# Patient Record
Sex: MALE | Race: WHITE | Employment: OTHER | URBAN - METROPOLITAN AREA
[De-identification: names, ages, dates, MRNs, and addresses within clinical notes are randomized per-mention and may not be internally consistent; named-entity substitution may affect disease eponyms.]

---

## 2020-06-04 ENCOUNTER — HOSPITAL ENCOUNTER (INPATIENT)
Age: 70
LOS: 12 days | Discharge: HOME | DRG: 215 | End: 2020-06-16
Attending: EMERGENCY MEDICINE | Admitting: PEDIATRICS
Payer: MEDICARE

## 2020-06-04 ENCOUNTER — APPOINTMENT (OUTPATIENT)
Dept: GENERAL RADIOLOGY | Age: 70
DRG: 215 | End: 2020-06-04
Payer: MEDICARE

## 2020-06-04 PROBLEM — I48.91 ATRIAL FIBRILLATION WITH RAPID VENTRICULAR RESPONSE (HCC): Status: ACTIVE | Noted: 2020-06-04

## 2020-06-04 LAB
A/G RATIO: 1.3 (ref 1.1–2.2)
ALBUMIN SERPL-MCNC: 3.8 G/DL (ref 3.4–5)
ALP BLD-CCNC: 51 U/L (ref 40–129)
ALT SERPL-CCNC: 18 U/L (ref 10–40)
ANION GAP SERPL CALCULATED.3IONS-SCNC: 8 MMOL/L (ref 3–16)
AST SERPL-CCNC: 24 U/L (ref 15–37)
BACTERIA: ABNORMAL /HPF
BASOPHILS ABSOLUTE: 0.1 K/UL (ref 0–0.2)
BASOPHILS RELATIVE PERCENT: 0.9 %
BILIRUB SERPL-MCNC: 1.3 MG/DL (ref 0–1)
BILIRUBIN URINE: NEGATIVE
BLOOD, URINE: NEGATIVE
BUN BLDV-MCNC: 23 MG/DL (ref 7–20)
CALCIUM SERPL-MCNC: 8.8 MG/DL (ref 8.3–10.6)
CHLORIDE BLD-SCNC: 108 MMOL/L (ref 99–110)
CLARITY: CLEAR
CO2: 24 MMOL/L (ref 21–32)
COLOR: YELLOW
CREAT SERPL-MCNC: 1.1 MG/DL (ref 0.8–1.3)
EKG ATRIAL RATE: 138 BPM
EKG DIAGNOSIS: NORMAL
EKG Q-T INTERVAL: 322 MS
EKG QRS DURATION: 98 MS
EKG QTC CALCULATION (BAZETT): 473 MS
EKG R AXIS: -29 DEGREES
EKG T AXIS: 160 DEGREES
EKG VENTRICULAR RATE: 130 BPM
EOSINOPHILS ABSOLUTE: 0.1 K/UL (ref 0–0.6)
EOSINOPHILS RELATIVE PERCENT: 1.4 %
GFR AFRICAN AMERICAN: >60
GFR NON-AFRICAN AMERICAN: >60
GLOBULIN: 2.9 G/DL
GLUCOSE BLD-MCNC: 127 MG/DL (ref 70–99)
GLUCOSE URINE: NEGATIVE MG/DL
HCT VFR BLD CALC: 43.6 % (ref 40.5–52.5)
HEMOGLOBIN: 15 G/DL (ref 13.5–17.5)
KETONES, URINE: NEGATIVE MG/DL
LEUKOCYTE ESTERASE, URINE: NEGATIVE
LYMPHOCYTES ABSOLUTE: 1.6 K/UL (ref 1–5.1)
LYMPHOCYTES RELATIVE PERCENT: 18.7 %
MCH RBC QN AUTO: 33.3 PG (ref 26–34)
MCHC RBC AUTO-ENTMCNC: 34.3 G/DL (ref 31–36)
MCV RBC AUTO: 97 FL (ref 80–100)
MICROSCOPIC EXAMINATION: YES
MONOCYTES ABSOLUTE: 0.7 K/UL (ref 0–1.3)
MONOCYTES RELATIVE PERCENT: 8.2 %
NEUTROPHILS ABSOLUTE: 6 K/UL (ref 1.7–7.7)
NEUTROPHILS RELATIVE PERCENT: 70.8 %
NITRITE, URINE: POSITIVE
PDW BLD-RTO: 13.7 % (ref 12.4–15.4)
PH UA: 7 (ref 5–8)
PLATELET # BLD: 159 K/UL (ref 135–450)
PMV BLD AUTO: 8.4 FL (ref 5–10.5)
POTASSIUM REFLEX MAGNESIUM: 4.3 MMOL/L (ref 3.5–5.1)
PRO-BNP: 2161 PG/ML (ref 0–124)
PROTEIN UA: NEGATIVE MG/DL
RBC # BLD: 4.5 M/UL (ref 4.2–5.9)
RBC UA: ABNORMAL /HPF (ref 0–4)
SARS-COV-2, PCR: NOT DETECTED
SODIUM BLD-SCNC: 140 MMOL/L (ref 136–145)
SPECIFIC GRAVITY UA: 1.01 (ref 1–1.03)
T4 FREE: 1.3 NG/DL (ref 0.9–1.8)
TOTAL PROTEIN: 6.7 G/DL (ref 6.4–8.2)
TROPONIN: <0.01 NG/ML
TSH REFLEX: 4.46 UIU/ML (ref 0.27–4.2)
URINE TYPE: ABNORMAL
UROBILINOGEN, URINE: 0.2 E.U./DL
WBC # BLD: 8.4 K/UL (ref 4–11)
WBC UA: ABNORMAL /HPF (ref 0–5)

## 2020-06-04 PROCEDURE — 84439 ASSAY OF FREE THYROXINE: CPT

## 2020-06-04 PROCEDURE — 84443 ASSAY THYROID STIM HORMONE: CPT

## 2020-06-04 PROCEDURE — 6370000000 HC RX 637 (ALT 250 FOR IP): Performed by: EMERGENCY MEDICINE

## 2020-06-04 PROCEDURE — 99285 EMERGENCY DEPT VISIT HI MDM: CPT

## 2020-06-04 PROCEDURE — 6360000002 HC RX W HCPCS: Performed by: PEDIATRICS

## 2020-06-04 PROCEDURE — 83880 ASSAY OF NATRIURETIC PEPTIDE: CPT

## 2020-06-04 PROCEDURE — 6370000000 HC RX 637 (ALT 250 FOR IP): Performed by: PEDIATRICS

## 2020-06-04 PROCEDURE — 2500000003 HC RX 250 WO HCPCS: Performed by: EMERGENCY MEDICINE

## 2020-06-04 PROCEDURE — 84484 ASSAY OF TROPONIN QUANT: CPT

## 2020-06-04 PROCEDURE — 96376 TX/PRO/DX INJ SAME DRUG ADON: CPT

## 2020-06-04 PROCEDURE — U0003 INFECTIOUS AGENT DETECTION BY NUCLEIC ACID (DNA OR RNA); SEVERE ACUTE RESPIRATORY SYNDROME CORONAVIRUS 2 (SARS-COV-2) (CORONAVIRUS DISEASE [COVID-19]), AMPLIFIED PROBE TECHNIQUE, MAKING USE OF HIGH THROUGHPUT TECHNOLOGIES AS DESCRIBED BY CMS-2020-01-R: HCPCS

## 2020-06-04 PROCEDURE — 96365 THER/PROPH/DIAG IV INF INIT: CPT

## 2020-06-04 PROCEDURE — 87186 SC STD MICRODIL/AGAR DIL: CPT

## 2020-06-04 PROCEDURE — 87077 CULTURE AEROBIC IDENTIFY: CPT

## 2020-06-04 PROCEDURE — 93010 ELECTROCARDIOGRAM REPORT: CPT | Performed by: INTERNAL MEDICINE

## 2020-06-04 PROCEDURE — 81001 URINALYSIS AUTO W/SCOPE: CPT

## 2020-06-04 PROCEDURE — 6370000000 HC RX 637 (ALT 250 FOR IP): Performed by: NURSE PRACTITIONER

## 2020-06-04 PROCEDURE — 80053 COMPREHEN METABOLIC PANEL: CPT

## 2020-06-04 PROCEDURE — 36415 COLL VENOUS BLD VENIPUNCTURE: CPT

## 2020-06-04 PROCEDURE — 87086 URINE CULTURE/COLONY COUNT: CPT

## 2020-06-04 PROCEDURE — 93005 ELECTROCARDIOGRAM TRACING: CPT | Performed by: EMERGENCY MEDICINE

## 2020-06-04 PROCEDURE — 2580000003 HC RX 258: Performed by: EMERGENCY MEDICINE

## 2020-06-04 PROCEDURE — 99291 CRITICAL CARE FIRST HOUR: CPT | Performed by: INTERNAL MEDICINE

## 2020-06-04 PROCEDURE — 85025 COMPLETE CBC W/AUTO DIFF WBC: CPT

## 2020-06-04 PROCEDURE — 2580000003 HC RX 258: Performed by: PEDIATRICS

## 2020-06-04 PROCEDURE — 1200000000 HC SEMI PRIVATE

## 2020-06-04 PROCEDURE — 96375 TX/PRO/DX INJ NEW DRUG ADDON: CPT

## 2020-06-04 PROCEDURE — 71045 X-RAY EXAM CHEST 1 VIEW: CPT

## 2020-06-04 PROCEDURE — 6360000002 HC RX W HCPCS: Performed by: EMERGENCY MEDICINE

## 2020-06-04 PROCEDURE — 6370000000 HC RX 637 (ALT 250 FOR IP): Performed by: INTERNAL MEDICINE

## 2020-06-04 RX ORDER — FUROSEMIDE 10 MG/ML
40 INJECTION INTRAMUSCULAR; INTRAVENOUS 2 TIMES DAILY
Status: CANCELLED | OUTPATIENT
Start: 2020-06-04

## 2020-06-04 RX ORDER — SODIUM CHLORIDE 0.9 % (FLUSH) 0.9 %
10 SYRINGE (ML) INJECTION EVERY 12 HOURS SCHEDULED
Status: DISCONTINUED | OUTPATIENT
Start: 2020-06-04 | End: 2020-06-16 | Stop reason: HOSPADM

## 2020-06-04 RX ORDER — LOSARTAN POTASSIUM 25 MG/1
25 TABLET ORAL DAILY
Status: DISCONTINUED | OUTPATIENT
Start: 2020-06-04 | End: 2020-06-12

## 2020-06-04 RX ORDER — CHOLECALCIFEROL (VITAMIN D3) 125 MCG
5 CAPSULE ORAL NIGHTLY PRN
Status: DISCONTINUED | OUTPATIENT
Start: 2020-06-04 | End: 2020-06-16 | Stop reason: HOSPADM

## 2020-06-04 RX ORDER — ASPIRIN 81 MG/1
81 TABLET ORAL ONCE
Status: COMPLETED | OUTPATIENT
Start: 2020-06-05 | End: 2020-06-05

## 2020-06-04 RX ORDER — CARVEDILOL 6.25 MG/1
6.25 TABLET ORAL 2 TIMES DAILY WITH MEALS
Status: DISCONTINUED | OUTPATIENT
Start: 2020-06-04 | End: 2020-06-06

## 2020-06-04 RX ORDER — DILTIAZEM HYDROCHLORIDE 5 MG/ML
10 INJECTION INTRAVENOUS ONCE
Status: COMPLETED | OUTPATIENT
Start: 2020-06-04 | End: 2020-06-04

## 2020-06-04 RX ORDER — FUROSEMIDE 10 MG/ML
40 INJECTION INTRAMUSCULAR; INTRAVENOUS 2 TIMES DAILY
Status: DISCONTINUED | OUTPATIENT
Start: 2020-06-05 | End: 2020-06-08 | Stop reason: ALTCHOICE

## 2020-06-04 RX ORDER — FUROSEMIDE 10 MG/ML
20 INJECTION INTRAMUSCULAR; INTRAVENOUS 2 TIMES DAILY
Status: DISCONTINUED | OUTPATIENT
Start: 2020-06-04 | End: 2020-06-04

## 2020-06-04 RX ORDER — FUROSEMIDE 10 MG/ML
40 INJECTION INTRAMUSCULAR; INTRAVENOUS ONCE
Status: COMPLETED | OUTPATIENT
Start: 2020-06-04 | End: 2020-06-04

## 2020-06-04 RX ORDER — NITROGLYCERIN 0.4 MG/1
0.4 TABLET SUBLINGUAL EVERY 5 MIN PRN
Status: DISCONTINUED | OUTPATIENT
Start: 2020-06-04 | End: 2020-06-04

## 2020-06-04 RX ORDER — GABAPENTIN 100 MG/1
100 CAPSULE ORAL 3 TIMES DAILY
Status: DISCONTINUED | OUTPATIENT
Start: 2020-06-04 | End: 2020-06-16 | Stop reason: HOSPADM

## 2020-06-04 RX ORDER — TAMSULOSIN HYDROCHLORIDE 0.4 MG/1
0.4 CAPSULE ORAL DAILY
Status: DISCONTINUED | OUTPATIENT
Start: 2020-06-04 | End: 2020-06-16 | Stop reason: HOSPADM

## 2020-06-04 RX ORDER — CARVEDILOL 3.12 MG/1
3.12 TABLET ORAL 2 TIMES DAILY WITH MEALS
Status: DISCONTINUED | OUTPATIENT
Start: 2020-06-04 | End: 2020-06-04

## 2020-06-04 RX ORDER — ASPIRIN 81 MG/1
324 TABLET, CHEWABLE ORAL ONCE
Status: COMPLETED | OUTPATIENT
Start: 2020-06-04 | End: 2020-06-04

## 2020-06-04 RX ORDER — SODIUM CHLORIDE 0.9 % (FLUSH) 0.9 %
10 SYRINGE (ML) INJECTION PRN
Status: DISCONTINUED | OUTPATIENT
Start: 2020-06-04 | End: 2020-06-16 | Stop reason: HOSPADM

## 2020-06-04 RX ADMIN — FUROSEMIDE 20 MG: 10 INJECTION, SOLUTION INTRAMUSCULAR; INTRAVENOUS at 12:21

## 2020-06-04 RX ADMIN — CARVEDILOL 6.25 MG: 6.25 TABLET, FILM COATED ORAL at 17:19

## 2020-06-04 RX ADMIN — GABAPENTIN 100 MG: 100 CAPSULE ORAL at 12:22

## 2020-06-04 RX ADMIN — DILTIAZEM HYDROCHLORIDE 10 MG: 5 INJECTION INTRAVENOUS at 05:55

## 2020-06-04 RX ADMIN — Medication 10 ML: at 12:23

## 2020-06-04 RX ADMIN — NITROGLYCERIN 0.4 MG: 0.4 TABLET, ORALLY DISINTEGRATING SUBLINGUAL at 05:38

## 2020-06-04 RX ADMIN — TAMSULOSIN HYDROCHLORIDE 0.4 MG: 0.4 CAPSULE ORAL at 12:22

## 2020-06-04 RX ADMIN — MELATONIN TAB 5 MG 5 MG: 5 TAB at 21:51

## 2020-06-04 RX ADMIN — DILTIAZEM HYDROCHLORIDE 5 MG/HR: 5 INJECTION INTRAVENOUS at 07:34

## 2020-06-04 RX ADMIN — ASPIRIN 81 MG 324 MG: 81 TABLET ORAL at 05:38

## 2020-06-04 RX ADMIN — CARVEDILOL 3.12 MG: 3.12 TABLET, FILM COATED ORAL at 12:22

## 2020-06-04 RX ADMIN — LOSARTAN POTASSIUM 25 MG: 25 TABLET, FILM COATED ORAL at 12:21

## 2020-06-04 RX ADMIN — GABAPENTIN 100 MG: 100 CAPSULE ORAL at 21:51

## 2020-06-04 RX ADMIN — FUROSEMIDE 40 MG: 10 INJECTION, SOLUTION INTRAMUSCULAR; INTRAVENOUS at 05:39

## 2020-06-04 ASSESSMENT — ENCOUNTER SYMPTOMS
TROUBLE SWALLOWING: 0
SHORTNESS OF BREATH: 1
PHOTOPHOBIA: 0
SINUS PRESSURE: 0
CONSTIPATION: 0
VOMITING: 0
BACK PAIN: 0
WHEEZING: 0
EYE DISCHARGE: 0
ABDOMINAL DISTENTION: 0
FACIAL SWELLING: 0
SORE THROAT: 0
EYE ITCHING: 0
CHEST TIGHTNESS: 0
DIARRHEA: 0
NAUSEA: 0
VOICE CHANGE: 0
COUGH: 0
BLOOD IN STOOL: 0
ABDOMINAL PAIN: 0
EYE REDNESS: 0
RHINORRHEA: 0
STRIDOR: 0
EYE PAIN: 0
ANAL BLEEDING: 0

## 2020-06-04 ASSESSMENT — PAIN DESCRIPTION - PAIN TYPE: TYPE: ACUTE PAIN

## 2020-06-04 ASSESSMENT — PAIN SCALES - GENERAL
PAINLEVEL_OUTOF10: 3
PAINLEVEL_OUTOF10: 8
PAINLEVEL_OUTOF10: 0
PAINLEVEL_OUTOF10: 0

## 2020-06-04 ASSESSMENT — PAIN DESCRIPTION - LOCATION: LOCATION: FOOT

## 2020-06-04 NOTE — ED PROVIDER NOTES
file    Intimate partner violence     Fear of current or ex partner: Not on file     Emotionally abused: Not on file     Physically abused: Not on file     Forced sexual activity: Not on file   Other Topics Concern    Not on file   Social History Narrative    Not on file           Review of Systems   Constitutional: Negative for activity change, appetite change, chills, diaphoresis, fatigue and fever. HENT: Negative. Negative for congestion, dental problem, ear pain, facial swelling, rhinorrhea, sinus pressure, sneezing, sore throat, tinnitus, trouble swallowing and voice change. Eyes: Negative for photophobia, pain, discharge, redness, itching and visual disturbance. Respiratory: Positive for shortness of breath. Negative for cough, chest tightness, wheezing and stridor. Cardiovascular: Positive for palpitations and leg swelling. Negative for chest pain. Gastrointestinal: Negative for abdominal distention, abdominal pain, anal bleeding, blood in stool, constipation, diarrhea, nausea and vomiting. Genitourinary: Negative for difficulty urinating, discharge, dysuria, frequency, hematuria, testicular pain and urgency. Musculoskeletal: Negative for back pain, joint swelling, neck pain and neck stiffness. Skin: Positive for rash. Negative for wound. Neurological: Negative for dizziness, syncope, facial asymmetry, speech difficulty, weakness, numbness and headaches. Hematological: Does not bruise/bleed easily. Psychiatric/Behavioral: Negative for agitation, confusion, hallucinations, self-injury, sleep disturbance and suicidal ideas. The patient is not nervous/anxious. All other systems reviewed and are negative. Physical Exam  Vitals signs and nursing note reviewed. Constitutional:       General: He is not in acute distress. Appearance: He is well-developed. HENT:      Head: Normocephalic and atraumatic.       Right Ear: External ear normal.      Left Ear: External ear normal. Nose: Nose normal.      Mouth/Throat:      Pharynx: No oropharyngeal exudate. Eyes:      General: No scleral icterus. Right eye: No discharge. Left eye: No discharge. Conjunctiva/sclera: Conjunctivae normal.      Pupils: Pupils are equal, round, and reactive to light. Neck:      Musculoskeletal: Normal range of motion and neck supple. Vascular: No JVD. Trachea: No tracheal deviation. Cardiovascular:      Rate and Rhythm: Normal rate and regular rhythm. Heart sounds: Normal heart sounds. No murmur. No friction rub. No gallop. Pulmonary:      Effort: Pulmonary effort is normal. No respiratory distress. Breath sounds: Rales present. No wheezing. Abdominal:      General: Bowel sounds are normal. There is distension. Palpations: Abdomen is soft. There is no mass. Tenderness: There is no abdominal tenderness. There is no guarding or rebound. Musculoskeletal: Normal range of motion. General: No tenderness. Right lower leg: Edema present. Left lower leg: Edema present. Lymphadenopathy:      Cervical: No cervical adenopathy. Skin:     General: Skin is warm and dry. Capillary Refill: Capillary refill takes less than 2 seconds. Coloration: Skin is not pale. Findings: Rash (petechia both lower legs) present. No erythema. Neurological:      Mental Status: He is alert and oriented to person, place, and time. Cranial Nerves: No cranial nerve deficit. Motor: No abnormal muscle tone. Coordination: Coordination normal.      Deep Tendon Reflexes: Reflexes are normal and symmetric. Reflexes normal.   Psychiatric:         Behavior: Behavior normal.         Thought Content:  Thought content normal.         Judgment: Judgment normal.          Procedures     MDM   Results for orders placed or performed during the hospital encounter of 06/04/20   CBC Auto Differential   Result Value Ref Range    WBC 8.4 4.0 - 11.0 K/uL

## 2020-06-04 NOTE — H&P
Hospital Medicine History & Physical      PCP: Pawel Fry MD    Date of Admission: 6/4/2020    Date of Service: Pt seen/examined on 06/04/20 and Admitted to Inpatient with expected LOS greater than two midnights due to medical therapy. Chief Complaint:   \"I couldn't breathe and I lost energy\"       History Of Present Illness:   71 y.o. male who presented to [facility] with report that over the past 2-3 days he has developed shortness of breath and felt weak. He has developed swelling his feet about 3-4 days. He presented to the ED where he was found to be in atrial fibrillation. He reports he had an arrhythmia about 10 years ago - but he doesn't recall the name - but he recalls that p waves were missing. He also reports urinary urgency - having to go suddenly. He had some dysuria a few months ago - after he had a bad cold. Nocturia 2-3 x / night. Upon further discussion - this afib is not new. He was diagnosed with atrial fibrillation about 10 years ago. He was treated over at [facility] and had an ablation, but he said it didn't work. He was started on coumadin but after a while decided he didn't want to take it anymore so stopped taking it. His active problem list in care-everywhere includes atrial fibrillation, sick sinus syndrome, atrial fibrillation with ablation, and cardiomyopathy with an EF of 25%. I'm wondering if there still might be some history he hasn't mentioned- as apixaban is noted in his [facility] care-everywhere as of 11/18/19. He is originally from [place], has lived in the [address] [place], [place] [address] Floor for 30 years. He speaks [place], but sometimes pauses to try to find the right words.       ROS:   No fevers   His feet get cold   No headache   Wears glasses   Reports nasal sinus drainage with associated sore throat     No chest pain   He does have increased work of breathing   Right now he reports dyspnea after walking only a \"few meters\"   No cough     No nausea   No vomiting   No diarrhea   No constipation   No blood in stool     No rash   No DM   No anxiety   No pain - with exception of sometimes in his feet (peripheral neuropathy)     FamHx:   - dad  from heart problems   - mom  - but not sure what from       SocHx:   - does not smoke   - consumes some alcohol, but none recently   - , living with his ex-wife   - has 1 daughter in [facility])   - was working on [employer]         Past Medical History:          Diagnosis Date    Atrial fibrillation [facility])        Past Surgical History:      History reviewed. No pertinent surgical history. Medications Prior to Admission:      Prior to Admission medications    Not on File       Allergies:  Patient has no known allergies. Social History:      The patient currently lives with his ex wife     TOBACCO:   reports that he has never smoked. He has never used smokeless tobacco.  ETOH:   reports previous alcohol use. E-Cigarettes Vaping or Juuling     Questions Responses    Vaping Use     Start Date     Does device contain nicotine? Quit Date     Vaping Type             Family History:    As noted     REVIEW OF SYSTEMS:   Pertinent positives as noted in the HPI. All other systems reviewed and negative. PHYSICAL EXAM PERFORMED:    BP (!) 149/99   Pulse 103   Temp 97.8 °F (36.6 °C) (Oral)   Resp 21   Ht 6' 4\" (1.93 m)   Wt 230 lb (104.3 kg)   SpO2 94%   BMI 28.00 kg/m²     General appearance:  No apparent distress, appears stated age and cooperative. HEENT:  Normal cephalic, atraumatic without obvious deformity. Pupils equal, round, and reactive to light. Extra ocular muscles intact. Conjunctivae/corneas clear. Neck: Supple, with full range of motion. . Trachea midline. + JVD - at least 10 cm     Respiratory:  Normal respiratory effort. Clear to auscultation, bilaterally without Rales/Wheezes/Rhonchi.     Cardiovascular:  Irregularly irregular, tachycardic, but I don't appreciate a murmur     Abdomen: Soft, non-tender   Seems mildly full / distended     Musculoskeletal:     2+ leg edema at the ankles   1+ leg edema just below the knees     Skin: Skin color appropriate     Neurologic:     Speech clear   No facial droop   Moves ext x 4     Psychiatric:  Alert and oriented, thought content appropriate, normal insight  Capillary Refill: Brisk,< 3 seconds   Peripheral Pulses: +2 palpable, equal bilaterally       Labs:     Recent Labs     06/04/20  0530   WBC 8.4   HGB 15.0   HCT 43.6        Recent Labs     06/04/20  0530      K 4.3      CO2 24   BUN 23*   CREATININE 1.1   CALCIUM 8.8     Recent Labs     06/04/20  0530   AST 24   ALT 18   BILITOT 1.3*   ALKPHOS 51     No results for input(s): INR in the last 72 hours. Recent Labs     06/04/20  0530   TROPONINI <0.01       Urinalysis:    No results found for: De La Rosa Bimler, BACTERIA, RBCUA, BLOODU, [address], GLUCOSEU    Radiology:        EKG:  I have reviewed the EKG with the following interpretation: atrial fibrillation, rapid ventricular response, ST changes     XR CHEST PORTABLE   Final Result   No radiographic evidence of acute pulmonary disease.              ASSESSMENT:    Active Hospital Problems    Diagnosis Date Noted    Atrial fibrillation with rapid ventricular response [facility]) [I48.91] 06/04/2020     71 y.o. male     Atrial fibrillation with rapid ventricular response, has prior hx of afib, EKG with RBBB, ST depression in the lateral leads, also with symptoms of heart failure:   - diltiazem drip   - cardiology consulted   - he was successfully cardioverted previously per UC note in care-everywhere (see 10/13/06), also with hx of ablation  - furosemide 20 mg IV BID (at 0900 and 1600)   - aspirin po daily   - echo   - daily weights   - I/Os    - follow K and mag level in AM  - I think his chadvasc score is 2   - he thinks he would usually weight about 225-230 lbs, weight today of 230 lbs   - no known hx of

## 2020-06-04 NOTE — H&P
murmurs, rubs or gallops. Abdomen: Soft, non-tender, non-distended with normal bowel sounds. Musculoskeletal:  No clubbing, cyanosis or edema bilaterally. Full range of motion without deformity. Skin: Skin color, texture, turgor normal.  No rashes or lesions. Neurologic:  Neurovascularly intact without any focal sensory/motor deficits. Cranial nerves: II-XII intact, grossly non-focal.  Psychiatric:  Alert and oriented, thought content appropriate, normal insight  Capillary Refill: Brisk,< 3 seconds   Peripheral Pulses: +2 palpable, equal bilaterally       Labs:     Recent Labs     06/04/20  0530   WBC 8.4   HGB 15.0   HCT 43.6        Recent Labs     06/04/20  0530      K 4.3      CO2 24   BUN 23*   CREATININE 1.1   CALCIUM 8.8     Recent Labs     06/04/20  0530   AST 24   ALT 18   BILITOT 1.3*   ALKPHOS 51     No results for input(s): INR in the last 72 hours. Recent Labs     06/04/20  0530   TROPONINI <0.01       Urinalysis:    No results found for: Joe Hampton, BACTERIA, RBCUA, BLOODU, [address], GLUCOSEU    Radiology:     CXR: I have reviewed the CXR with the following interpretation: ***  EKG:  I have reviewed the EKG with the following interpretation: ***    XR CHEST PORTABLE   Final Result   No radiographic evidence of acute pulmonary disease.              ASSESSMENT:    Active Hospital Problems    Diagnosis Date Noted    Atrial fibrillation with rapid ventricular response ([facility] [address] [I48.91] 06/04/2020     Atrial fibrillation with rapid ventricular response, has prior hx of afib, EKG with RBBB, ST depression in the lateral leads, also with symptoms of heart failure:   - diltiazem drip   - cardiology consulted   - furosemide 40 mg IV daily   - aspirin po daily   - echo   - daily weights   - I/Os        DVT Prophylaxis: ***  Diet: No diet orders on file  Code Status: No Order    PT/OT Eval Status: ***    Dispo - ***       Cynthia Watson MD    Thank you Claudeen Juba, MD for the

## 2020-06-04 NOTE — PROGRESS NOTES
Patient is refusing his Lovenox injection. He states that he had it before and it was bad. Patient stated he took coreg when it was being triaed and it did not work. He agreed to take during this admission but states it does not work for him.

## 2020-06-05 LAB
ANION GAP SERPL CALCULATED.3IONS-SCNC: 10 MMOL/L (ref 3–16)
BUN BLDV-MCNC: 19 MG/DL (ref 7–20)
CALCIUM SERPL-MCNC: 8.5 MG/DL (ref 8.3–10.6)
CHLORIDE BLD-SCNC: 105 MMOL/L (ref 99–110)
CO2: 23 MMOL/L (ref 21–32)
CREAT SERPL-MCNC: 1.1 MG/DL (ref 0.8–1.3)
FOLATE: 12.41 NG/ML (ref 4.78–24.2)
GFR AFRICAN AMERICAN: >60
GFR NON-AFRICAN AMERICAN: >60
GLUCOSE BLD-MCNC: 117 MG/DL (ref 70–99)
MAGNESIUM: 2.2 MG/DL (ref 1.8–2.4)
POTASSIUM REFLEX MAGNESIUM: 4 MMOL/L (ref 3.5–5.1)
SODIUM BLD-SCNC: 138 MMOL/L (ref 136–145)
VITAMIN B-12: 518 PG/ML (ref 211–911)

## 2020-06-05 PROCEDURE — 6360000002 HC RX W HCPCS: Performed by: INTERNAL MEDICINE

## 2020-06-05 PROCEDURE — 82746 ASSAY OF FOLIC ACID SERUM: CPT

## 2020-06-05 PROCEDURE — 82607 VITAMIN B-12: CPT

## 2020-06-05 PROCEDURE — 2580000003 HC RX 258: Performed by: PEDIATRICS

## 2020-06-05 PROCEDURE — 6370000000 HC RX 637 (ALT 250 FOR IP): Performed by: PEDIATRICS

## 2020-06-05 PROCEDURE — 6370000000 HC RX 637 (ALT 250 FOR IP): Performed by: NURSE PRACTITIONER

## 2020-06-05 PROCEDURE — 6360000002 HC RX W HCPCS: Performed by: REGISTERED NURSE

## 2020-06-05 PROCEDURE — 83735 ASSAY OF MAGNESIUM: CPT

## 2020-06-05 PROCEDURE — 2580000003 HC RX 258: Performed by: REGISTERED NURSE

## 2020-06-05 PROCEDURE — 2500000003 HC RX 250 WO HCPCS: Performed by: EMERGENCY MEDICINE

## 2020-06-05 PROCEDURE — 1200000000 HC SEMI PRIVATE

## 2020-06-05 PROCEDURE — 2580000003 HC RX 258: Performed by: EMERGENCY MEDICINE

## 2020-06-05 PROCEDURE — 51798 US URINE CAPACITY MEASURE: CPT

## 2020-06-05 PROCEDURE — 6370000000 HC RX 637 (ALT 250 FOR IP): Performed by: INTERNAL MEDICINE

## 2020-06-05 PROCEDURE — 99233 SBSQ HOSP IP/OBS HIGH 50: CPT | Performed by: INTERNAL MEDICINE

## 2020-06-05 PROCEDURE — 80048 BASIC METABOLIC PNL TOTAL CA: CPT

## 2020-06-05 RX ADMIN — LOSARTAN POTASSIUM 25 MG: 25 TABLET, FILM COATED ORAL at 08:48

## 2020-06-05 RX ADMIN — GABAPENTIN 100 MG: 100 CAPSULE ORAL at 14:44

## 2020-06-05 RX ADMIN — ASPIRIN 81 MG: 81 TABLET, COATED ORAL at 00:11

## 2020-06-05 RX ADMIN — FUROSEMIDE 40 MG: 10 INJECTION, SOLUTION INTRAMUSCULAR; INTRAVENOUS at 17:12

## 2020-06-05 RX ADMIN — TAMSULOSIN HYDROCHLORIDE 0.4 MG: 0.4 CAPSULE ORAL at 08:48

## 2020-06-05 RX ADMIN — CEFTRIAXONE SODIUM 1 G: 1 INJECTION, POWDER, FOR SOLUTION INTRAMUSCULAR; INTRAVENOUS at 14:45

## 2020-06-05 RX ADMIN — FUROSEMIDE 40 MG: 10 INJECTION, SOLUTION INTRAMUSCULAR; INTRAVENOUS at 08:48

## 2020-06-05 RX ADMIN — Medication 10 ML: at 21:56

## 2020-06-05 RX ADMIN — GABAPENTIN 100 MG: 100 CAPSULE ORAL at 21:56

## 2020-06-05 RX ADMIN — Medication 10 ML: at 08:49

## 2020-06-05 RX ADMIN — DILTIAZEM HYDROCHLORIDE 2.5 MG/HR: 5 INJECTION INTRAVENOUS at 22:02

## 2020-06-05 RX ADMIN — DILTIAZEM HYDROCHLORIDE 5 MG/HR: 5 INJECTION INTRAVENOUS at 06:39

## 2020-06-05 RX ADMIN — CARVEDILOL 6.25 MG: 6.25 TABLET, FILM COATED ORAL at 08:48

## 2020-06-05 RX ADMIN — GABAPENTIN 100 MG: 100 CAPSULE ORAL at 08:48

## 2020-06-05 RX ADMIN — MELATONIN TAB 5 MG 5 MG: 5 TAB at 22:23

## 2020-06-05 ASSESSMENT — PAIN SCALES - GENERAL
PAINLEVEL_OUTOF10: 0

## 2020-06-05 NOTE — PROGRESS NOTES
Hospitalist Progress Note      PCP: Milton Denson MD    Date of Admission: 6/4/2020    Chief Complaint: SOB, fatigue     Hospital Course:   71 y.o. male who presented to [facility] with report that over the past 2-3 days he has developed shortness of breath and felt weak. He has developed swelling his feet about 3-4 days. He presented to the ED where he was found to be in atrial fibrillation. He reports he had an arrhythmia about 10 years ago - but he doesn't recall the name - but he recalls that p waves were missing. He also reports urinary urgency - having to go suddenly. He had some dysuria a few months ago - after he had a bad cold. Nocturia 2-3 x / night. Upon further discussion - this afib is not new. He was diagnosed with atrial fibrillation about 10 years ago. He was treated over at [facility] and had an ablation, but he said it didn't work. He was started on coumadin but after a while decided he didn't want to take it anymore so stopped taking it. His active problem list in care-everywhere includes atrial fibrillation, sick sinus syndrome, atrial fibrillation with ablation, and cardiomyopathy with an EF of 25%. I'm wondering if there still might be some history he hasn't mentioned- as apixaban is noted in his [facility] care-everywhere as of 11/18/19. He is originally from [place], has lived in the [address] [place] [address] Floor for 30 years. He speaks [place], but sometimes pauses to try to find the right words. Subjective:   Pt is on RA. Afebrile. VSS. No dyspnea or chest pain. No N/V/D. COVID was negative.      Medications:  Reviewed    Infusion Medications    dilTIAZem (CARDIZEM) 125 mg in dextrose 5% 125 mL infusion 5 mg/hr (06/05/20 9393)     Scheduled Medications    cefTRIAXone (ROCEPHIN) IV  1 g Intravenous Q24H    sodium chloride flush  10 mL Intravenous 2 times per day    losartan  25 mg Oral Daily    gabapentin  100 mg Oral TID    tamsulosin  0.4 mg Oral Daily    carvedilol  6.25 mg Oral BID WC   

## 2020-06-05 NOTE — CONSULTS
[facility]                                  CONSULTATION    PATIENT NAME: Cale Rowe                      :        1950  MED REC NO:   [id number]                          ROOM:       0181  ACCOUNT NO:   [de-identified]                           ADMIT DATE: 2020  PROVIDER:     Marta Boston. Candice Wilburn MD    CONSULT DATE:  2020    REASON FOR CONSULTATION:  Shortness of breath and atrial fibrillation. HISTORY OF PRESENT ILLNESS:  This is a 40-year-old male who presented to  the hospital with chief complaint of shortness of breath. Symptoms  started several days prior to admission. It was worse with exertion,  although constant. He noticed increased associated swelling, no chest  pain. The swelling consists of feeling bloating in his abdomen as well  as swelling in his legs. He has had similar symptoms in the past.   Nothing seemed to precipitate the onset. It has been getting  progressively worse. It was essentially constant. PAST MEDICAL HISTORY:  1. Paroxysmal atrial fibrillation. He has reported that he had a  Mini-Maze procedure over 10 years ago, but had recurrent atrial  fibrillation after that. He was prescribed oral anticoagulation, but  has discontinued his medications and has not followed up with Cardiology  in what appears to be at least 10 years. 2.  Cardiomyopathy. It is reported that he had an ejection fraction of  25% in . Echo that I can locate showed an ejection fraction of 35%  to 40% in . It is unclear what etiology this cardiomyopathy was. 3.  Hyperlipidemia. SOCIAL HISTORY:  He does not smoke. FAMILY HISTORY:  Positive for heart disease. REVIEW OF SYSTEMS:  No fevers or chills. No cough. No focal neurologic  symptoms. No headache. No visual changes. No recent GI or   bleeding. No recent or upcoming surgeries.   All other systems are  negative except as present illness. ALLERGIES:  No known allergies. MEDICATIONS:  Prior to admission, he was not taking any of his  previously prescribed medications. PHYSICAL EXAMINATION:  VITALS:  Blood pressure is 132/94, heart rate 97, respirations 22, and  temperature is 97.8. He is 93% saturated on room air. Physical exam not performed due to COVID-19 isolation and desired to  reduce risk of exposure as well as reduce use of personal protective  equipment. DIAGNOSTIC DATA:  EKG, atrial fibrillation, left ventricular  hypertrophy, ST and T-wave abnormalities. Chest x-ray is negative. Troponin is less than 0.01. ProBNP is 2161. IMPRESSION:  1. Atrial fibrillation as described above. The patient is status post  Mini-Maze procedure over 10 years ago. He had recurrent atrial  fibrillation after this. The best I can tell is that he has not  followed up with Cardiology for approximately 10 years. He has not  taken any of his prescribed anticoagulation or other cardiac  medications. 2.  Shortness of breath, multifactorial including atrial fibrillation  and pulmonary edema. 3.  Acute congestive heart failure, likely systolic. Although his chest  x-ray did not show significant pulmonary edema. His ProBNP is elevated  and clinically, he appears to be volume overloaded. 4.  History of cardiomyopathy. Echo that I can obtain from 2007 showed  an ejection fraction of 35% to 40%. It is reported that he had an  ejection fraction as low as 25% in the past as well. 5.  Hyperlipidemia. 6.  Abnormal EKG. 7.  Tachycardia. 8.  Noncompliance. 9.  COVID-19 test is pending. RECOMMENDATIONS:  1. Diuresis with IV Lasix. 2.  Initiate beta-blocker and angiotensin receptor blocker. 3.  Initiate aspirin therapy. 4.  Anticoagulation, full-dose Lovenox and will transition to oral after  testing is completed, and if no procedures are planned. 5.  Diltiazem drip to control heart rate.   We will

## 2020-06-05 NOTE — PROGRESS NOTES
Nutrition Education    Type and Reason for Visit: Consult, Patient Education    Nutrition Assessment:     RD provided low sodium education today for CHF. Patient did state liking to eat salty foods but understood why that would worsen his condition. RD showed low salt cook books on [address] [facility] [address]. Patient ordered while in the room. RD left contact information for wife to contact if questions arise. · Verbally reviewed information with Patient  · Written educational materials provided. · Contact name and number provided. · Refer to Patient Education activity for more details.     Electronically signed by Moe Meek, [address] [place] [address],  on 6/5/20 at 3:20 PM EDT    Contact Number: Office: [phone]; [address]: [address]

## 2020-06-05 NOTE — PLAN OF CARE
Problem: OXYGENATION/RESPIRATORY FUNCTION  Goal: Patient will maintain patent airway  Outcome: Ongoing  Note:   Patient's EF (Ejection Fraction) is none on file  Heart Failure Medications:  Diuretics[de-identified] Furosemide    (One of the following REQUIRED for EF <40%/SYSTOLIC FAILURE but MAY be used in EF% >40%/DIASTOLIC FAILURE)        ACE[de-identified] None        ARB[de-identified] Losartan         ARNI[de-identified] None    (Beta Blockers)  NON- Evidenced Based Beta Blocker (for EF% >40%/DIASTOLIC FAILURE): None    Evidenced Based Beta Blocker::(REQUIRED for EF% <40%/SYSTOLIC FAILURE) Carvedilol- Coreg  . .................................................................................................................................................. Patient's weights and intake/output reviewed: Yes    Patient's Last Weight: 240 lbs obtained by standing scale. Difference of 0 lbs   than last documented weight. Intake/Output Summary (Last 24 hours) at 6/5/2020 1753  Last data filed at 6/5/2020 1717  Gross per 24 hour   Intake 480 ml   Output 1513 ml   Net -1033 ml       Comorbidities Reviewed Yes    Patient has a past medical history of Atrial fibrillation ([facility] [address] and Peripheral neuropathy. >>For CHF and Comorbidity documentation on Education Time and Topics, please see Education Tab    Progressive Mobility Assessment:  What is this patient's Current Level of Mobility?: Ambulatory-Up Ad Breann  How was this patient Mobilized today?: Up to Chair and Up in Room                 With Whom? Self                 Level of Difficulty/Assistance: Independent     Pt resting in bed at this time on room air. Pt with complaints of shortness of breath. Pt without lower extremity edema.      Patient and/or Family's stated Goal of Care this Admission: reduce shortness of breath, increase activity tolerance, better understand heart failure and disease management, and be more comfortable prior to discharge        :

## 2020-06-05 NOTE — PROGRESS NOTES
Patient transferred to room 207, report to [facility]. [facility] aware, confirmed pt on monitor with Henry Reece.

## 2020-06-06 LAB
AMPHETAMINE SCREEN, URINE: NORMAL
ANION GAP SERPL CALCULATED.3IONS-SCNC: 9 MMOL/L (ref 3–16)
BARBITURATE SCREEN URINE: NORMAL
BENZODIAZEPINE SCREEN, URINE: NORMAL
BUN BLDV-MCNC: 20 MG/DL (ref 7–20)
CALCIUM SERPL-MCNC: 8.9 MG/DL (ref 8.3–10.6)
CANNABINOID SCREEN URINE: NORMAL
CHLORIDE BLD-SCNC: 103 MMOL/L (ref 99–110)
CO2: 28 MMOL/L (ref 21–32)
COCAINE METABOLITE SCREEN URINE: NORMAL
CREAT SERPL-MCNC: 1.3 MG/DL (ref 0.8–1.3)
ETHANOL: NORMAL MG/DL (ref 0–0.08)
GFR AFRICAN AMERICAN: >60
GFR NON-AFRICAN AMERICAN: 55
GLUCOSE BLD-MCNC: 111 MG/DL (ref 70–99)
LV EF: 20 %
LVEF MODALITY: NORMAL
Lab: NORMAL
MAGNESIUM: 2.3 MG/DL (ref 1.8–2.4)
METHADONE SCREEN, URINE: NORMAL
OPIATE SCREEN URINE: NORMAL
ORGANISM: ABNORMAL
OXYCODONE URINE: NORMAL
PH UA: 6
PHENCYCLIDINE SCREEN URINE: NORMAL
POTASSIUM SERPL-SCNC: 4.2 MMOL/L (ref 3.5–5.1)
PROPOXYPHENE SCREEN: NORMAL
SODIUM BLD-SCNC: 140 MMOL/L (ref 136–145)
URINE CULTURE, ROUTINE: ABNORMAL

## 2020-06-06 PROCEDURE — 83835 ASSAY OF METANEPHRINES: CPT

## 2020-06-06 PROCEDURE — 36415 COLL VENOUS BLD VENIPUNCTURE: CPT

## 2020-06-06 PROCEDURE — 6360000002 HC RX W HCPCS: Performed by: INTERNAL MEDICINE

## 2020-06-06 PROCEDURE — 84155 ASSAY OF PROTEIN SERUM: CPT

## 2020-06-06 PROCEDURE — 6370000000 HC RX 637 (ALT 250 FOR IP): Performed by: PEDIATRICS

## 2020-06-06 PROCEDURE — 83883 ASSAY NEPHELOMETRY NOT SPEC: CPT

## 2020-06-06 PROCEDURE — C8929 TTE W OR WO FOL WCON,DOPPLER: HCPCS

## 2020-06-06 PROCEDURE — 80307 DRUG TEST PRSMV CHEM ANLYZR: CPT

## 2020-06-06 PROCEDURE — 84165 PROTEIN E-PHORESIS SERUM: CPT

## 2020-06-06 PROCEDURE — 6370000000 HC RX 637 (ALT 250 FOR IP): Performed by: NURSE PRACTITIONER

## 2020-06-06 PROCEDURE — 84156 ASSAY OF PROTEIN URINE: CPT

## 2020-06-06 PROCEDURE — 86644 CMV ANTIBODY: CPT

## 2020-06-06 PROCEDURE — 86255 FLUORESCENT ANTIBODY SCREEN: CPT

## 2020-06-06 PROCEDURE — 80048 BASIC METABOLIC PNL TOTAL CA: CPT

## 2020-06-06 PROCEDURE — 2580000003 HC RX 258: Performed by: PEDIATRICS

## 2020-06-06 PROCEDURE — 1200000000 HC SEMI PRIVATE

## 2020-06-06 PROCEDURE — G0480 DRUG TEST DEF 1-7 CLASSES: HCPCS

## 2020-06-06 PROCEDURE — 83550 IRON BINDING TEST: CPT

## 2020-06-06 PROCEDURE — 6370000000 HC RX 637 (ALT 250 FOR IP): Performed by: REGISTERED NURSE

## 2020-06-06 PROCEDURE — 6370000000 HC RX 637 (ALT 250 FOR IP): Performed by: INTERNAL MEDICINE

## 2020-06-06 PROCEDURE — 83735 ASSAY OF MAGNESIUM: CPT

## 2020-06-06 PROCEDURE — 84166 PROTEIN E-PHORESIS/URINE/CSF: CPT

## 2020-06-06 PROCEDURE — 86038 ANTINUCLEAR ANTIBODIES: CPT

## 2020-06-06 PROCEDURE — 99232 SBSQ HOSP IP/OBS MODERATE 35: CPT | Performed by: NURSE PRACTITIONER

## 2020-06-06 RX ORDER — ASPIRIN 81 MG/1
81 TABLET ORAL DAILY
Status: DISCONTINUED | OUTPATIENT
Start: 2020-06-06 | End: 2020-06-11

## 2020-06-06 RX ORDER — CEFDINIR 300 MG/1
300 CAPSULE ORAL EVERY 12 HOURS SCHEDULED
Status: COMPLETED | OUTPATIENT
Start: 2020-06-06 | End: 2020-06-12

## 2020-06-06 RX ORDER — METOPROLOL SUCCINATE 25 MG/1
25 TABLET, EXTENDED RELEASE ORAL DAILY
Status: DISCONTINUED | OUTPATIENT
Start: 2020-06-07 | End: 2020-06-08

## 2020-06-06 RX ADMIN — GABAPENTIN 100 MG: 100 CAPSULE ORAL at 14:30

## 2020-06-06 RX ADMIN — CARVEDILOL 6.25 MG: 6.25 TABLET, FILM COATED ORAL at 08:49

## 2020-06-06 RX ADMIN — FUROSEMIDE 40 MG: 10 INJECTION, SOLUTION INTRAMUSCULAR; INTRAVENOUS at 17:10

## 2020-06-06 RX ADMIN — ASPIRIN 81 MG: 81 TABLET ORAL at 10:03

## 2020-06-06 RX ADMIN — TAMSULOSIN HYDROCHLORIDE 0.4 MG: 0.4 CAPSULE ORAL at 08:49

## 2020-06-06 RX ADMIN — CEFDINIR 300 MG: 300 CAPSULE ORAL at 10:03

## 2020-06-06 RX ADMIN — LOSARTAN POTASSIUM 25 MG: 25 TABLET, FILM COATED ORAL at 08:49

## 2020-06-06 RX ADMIN — MELATONIN TAB 5 MG 5 MG: 5 TAB at 21:52

## 2020-06-06 RX ADMIN — FUROSEMIDE 40 MG: 10 INJECTION, SOLUTION INTRAMUSCULAR; INTRAVENOUS at 08:50

## 2020-06-06 RX ADMIN — Medication 10 ML: at 08:50

## 2020-06-06 RX ADMIN — ENOXAPARIN SODIUM 100 MG: 100 INJECTION SUBCUTANEOUS at 21:08

## 2020-06-06 RX ADMIN — Medication 10 ML: at 21:52

## 2020-06-06 RX ADMIN — GABAPENTIN 100 MG: 100 CAPSULE ORAL at 21:52

## 2020-06-06 RX ADMIN — GABAPENTIN 100 MG: 100 CAPSULE ORAL at 08:49

## 2020-06-06 RX ADMIN — CEFDINIR 300 MG: 300 CAPSULE ORAL at 21:52

## 2020-06-06 ASSESSMENT — ENCOUNTER SYMPTOMS
CHEST TIGHTNESS: 0
WHEEZING: 0
SHORTNESS OF BREATH: 0

## 2020-06-06 ASSESSMENT — PAIN SCALES - GENERAL
PAINLEVEL_OUTOF10: 0
PAINLEVEL_OUTOF10: 0

## 2020-06-06 NOTE — PLAN OF CARE
Problem: OXYGENATION/RESPIRATORY FUNCTION  Goal: Patient will maintain patent airway  Outcome: Ongoing     Problem: FLUID AND ELECTROLYTE IMBALANCE  Goal: Fluid and electrolyte balance are achieved/maintained  6/6/2020 1744 by Dilma Villa RN  Outcome: Ongoing       Patient's EF (Ejection Fraction) is less than 40%    Heart Failure Medications:  Diuretics[de-identified] Furosemide    (One of the following REQUIRED for EF <40%/SYSTOLIC FAILURE but MAY be used in EF% >40%/DIASTOLIC FAILURE)        ACE[de-identified] None        ARB[de-identified] Losartan         ARNI[de-identified] None    (Beta Blockers)  NON- Evidenced Based Beta Blocker (for EF% >40%/DIASTOLIC FAILURE): None    Evidenced Based Beta Blocker::(REQUIRED for EF% <40%/SYSTOLIC FAILURE) Carvedilol- Coreg  . .................................................................................................................................................. Patient's weights and intake/output reviewed: Yes    Patient's Last Weight: 244 lbs obtained by standing scale. Intake/Output Summary (Last 24 hours) at 6/6/2020 1744  Last data filed at 6/6/2020 1617  Gross per 24 hour   Intake 1077.96 ml   Output 1725 ml   Net -647.04 ml       Comorbidities Reviewed Yes    Patient has a past medical history of Atrial fibrillation ([facility] [address] and Peripheral neuropathy. >>For CHF and Comorbidity documentation on Education Time and Topics, please see Education Tab    Progressive Mobility Assessment:  What is this patient's Current Level of Mobility?: Ambulatory-Up Ad Breann  How was this patient Mobilized today?:  Up to Toilet/Shower and Up in Room                 With Whom? Self                 Level of Difficulty/Assistance: Independent     Pt sitting in bed at this time on room air. Pt with complaints of shortness of breath. Pt with pitting lower extremity edema.      Patient and/or Family's stated Goal of Care this Admission: reduce shortness of breath, increase activity tolerance, better understand heart failure and disease management, be more comfortable, and reduce lower extremity edema prior to discharge        :

## 2020-06-06 NOTE — PROGRESS NOTES
[facility]  Cardiology  Progress Note    Admission date:  2020    Reason for follow up visit: afib/schf    HPI/CC: Celi Shaffer is a 71 y.o. male who presented to the hospital with complaints of shortness of breath, worse with exertion and edema. COVID ruled out. He was noted to be in afib with RVR started on cardizem gtt. Being diuresed with IV Lasix. Echo ordered, not completed yet. Telemetry overnight shows atrial fibrillation- rate better controlled 70-100s. Subjective: Sitting up in bed. Stated shortness of breath has resolved, edema overall improved. Denies chest pain, shortness of breath, palpitations and dizziness. Vitals:  Blood pressure 120/79, pulse 79, temperature 97.4 °F (36.3 °C), temperature source Oral, resp. rate 19, height 6' 4\" (1.93 m), weight 244 lb 9.6 oz (110.9 kg), SpO2 95 %.   Temp  Av.8 °F (36.6 °C)  Min: 97.3 °F (36.3 °C)  Max: 98.5 °F (36.9 °C)  Pulse  Av.1  Min: 60  Max: 109  BP  Min: 96/68  Max: 144/112  SpO2  Av.7 %  Min: 90 %  Max: 98 %    24 hour I/O    Intake/Output Summary (Last 24 hours) at 2020 0748  Last data filed at 2020 0341  Gross per 24 hour   Intake 480 ml   Output 1813 ml   Net -1333 ml     Current Facility-Administered Medications   Medication Dose Route Frequency Provider Last Rate Last Dose    cefTRIAXone (ROCEPHIN) 1 g IVPB in 50 mL D5W minibag  1 g Intravenous Q24H Anu Marina, APRN - CNP   Stopped at 20 1538    dilTIAZem 125 mg in dextrose 5 % 125 mL infusion  5 mg/hr Intravenous Continuous Adrián De León MD 2.5 mL/hr at 20 2202 2.5 mg/hr at 20 2202    sodium chloride flush 0.9 % injection 10 mL  10 mL Intravenous 2 times per day Gaurav Leone MD   10 mL at 20    sodium chloride flush 0.9 % injection 10 mL  10 mL Intravenous PRN Roger Molina MD        perflutren lipid microspheres (DEFINITY) injection 1.65 mg  1.5 mL Intravenous ONCE PRN Gaurav Leone MD

## 2020-06-07 LAB
ANION GAP SERPL CALCULATED.3IONS-SCNC: 9 MMOL/L (ref 3–16)
ANTI-NUCLEAR ANTIBODY (ANA): NEGATIVE
BUN BLDV-MCNC: 17 MG/DL (ref 7–20)
CALCIUM SERPL-MCNC: 8.9 MG/DL (ref 8.3–10.6)
CHLORIDE BLD-SCNC: 103 MMOL/L (ref 99–110)
CO2: 27 MMOL/L (ref 21–32)
CREAT SERPL-MCNC: 1.2 MG/DL (ref 0.8–1.3)
FERRITIN: 125.3 NG/ML (ref 30–400)
GFR AFRICAN AMERICAN: >60
GFR NON-AFRICAN AMERICAN: 60
GLUCOSE BLD-MCNC: 107 MG/DL (ref 70–99)
IRON SATURATION: 22 % (ref 20–50)
IRON: 70 UG/DL (ref 59–158)
MAGNESIUM: 2.1 MG/DL (ref 1.8–2.4)
POTASSIUM SERPL-SCNC: 3.9 MMOL/L (ref 3.5–5.1)
PRO-BNP: 877 PG/ML (ref 0–124)
PROTEIN PROTEIN: <0.004 G/DL
PROTEIN PROTEIN: <4 MG/DL
SODIUM BLD-SCNC: 139 MMOL/L (ref 136–145)
TOTAL IRON BINDING CAPACITY: 317 UG/DL (ref 260–445)
TOTAL IRON BINDING CAPACITY: 323 UG/DL (ref 260–445)

## 2020-06-07 PROCEDURE — 83735 ASSAY OF MAGNESIUM: CPT

## 2020-06-07 PROCEDURE — 83540 ASSAY OF IRON: CPT

## 2020-06-07 PROCEDURE — 6370000000 HC RX 637 (ALT 250 FOR IP): Performed by: REGISTERED NURSE

## 2020-06-07 PROCEDURE — 6370000000 HC RX 637 (ALT 250 FOR IP): Performed by: NURSE PRACTITIONER

## 2020-06-07 PROCEDURE — 6360000002 HC RX W HCPCS: Performed by: INTERNAL MEDICINE

## 2020-06-07 PROCEDURE — 99232 SBSQ HOSP IP/OBS MODERATE 35: CPT | Performed by: NURSE PRACTITIONER

## 2020-06-07 PROCEDURE — 82728 ASSAY OF FERRITIN: CPT

## 2020-06-07 PROCEDURE — 1200000000 HC SEMI PRIVATE

## 2020-06-07 PROCEDURE — 83880 ASSAY OF NATRIURETIC PEPTIDE: CPT

## 2020-06-07 PROCEDURE — 6370000000 HC RX 637 (ALT 250 FOR IP): Performed by: PEDIATRICS

## 2020-06-07 PROCEDURE — 36415 COLL VENOUS BLD VENIPUNCTURE: CPT

## 2020-06-07 PROCEDURE — 2580000003 HC RX 258: Performed by: PEDIATRICS

## 2020-06-07 PROCEDURE — 80048 BASIC METABOLIC PNL TOTAL CA: CPT

## 2020-06-07 PROCEDURE — 83550 IRON BINDING TEST: CPT

## 2020-06-07 RX ADMIN — ENOXAPARIN SODIUM 100 MG: 100 INJECTION SUBCUTANEOUS at 08:50

## 2020-06-07 RX ADMIN — MELATONIN TAB 5 MG 5 MG: 5 TAB at 20:35

## 2020-06-07 RX ADMIN — CEFDINIR 300 MG: 300 CAPSULE ORAL at 08:50

## 2020-06-07 RX ADMIN — GABAPENTIN 100 MG: 100 CAPSULE ORAL at 20:35

## 2020-06-07 RX ADMIN — CEFDINIR 300 MG: 300 CAPSULE ORAL at 20:35

## 2020-06-07 RX ADMIN — GABAPENTIN 100 MG: 100 CAPSULE ORAL at 08:50

## 2020-06-07 RX ADMIN — ASPIRIN 81 MG: 81 TABLET ORAL at 08:50

## 2020-06-07 RX ADMIN — GABAPENTIN 100 MG: 100 CAPSULE ORAL at 13:56

## 2020-06-07 RX ADMIN — FUROSEMIDE 40 MG: 10 INJECTION, SOLUTION INTRAMUSCULAR; INTRAVENOUS at 08:50

## 2020-06-07 RX ADMIN — Medication 10 ML: at 20:35

## 2020-06-07 RX ADMIN — TAMSULOSIN HYDROCHLORIDE 0.4 MG: 0.4 CAPSULE ORAL at 08:50

## 2020-06-07 RX ADMIN — ENOXAPARIN SODIUM 100 MG: 100 INJECTION SUBCUTANEOUS at 20:34

## 2020-06-07 RX ADMIN — Medication 10 ML: at 08:50

## 2020-06-07 RX ADMIN — FUROSEMIDE 40 MG: 10 INJECTION, SOLUTION INTRAMUSCULAR; INTRAVENOUS at 16:32

## 2020-06-07 RX ADMIN — METOPROLOL SUCCINATE 25 MG: 25 TABLET, EXTENDED RELEASE ORAL at 08:50

## 2020-06-07 RX ADMIN — LOSARTAN POTASSIUM 25 MG: 25 TABLET, FILM COATED ORAL at 08:50

## 2020-06-07 ASSESSMENT — PAIN SCALES - GENERAL
PAINLEVEL_OUTOF10: 0

## 2020-06-07 ASSESSMENT — ENCOUNTER SYMPTOMS
SHORTNESS OF BREATH: 0
CHEST TIGHTNESS: 0
WHEEZING: 0

## 2020-06-07 NOTE — PLAN OF CARE
Problem: OXYGENATION/RESPIRATORY FUNCTION  Goal: Patient will maintain patent airway  Outcome: Ongoing     Problem: FLUID AND ELECTROLYTE IMBALANCE  Goal: Fluid and electrolyte balance are achieved/maintained  Outcome: Ongoing       Patient's EF (Ejection Fraction) is less than 40%    Heart Failure Medications:  Diuretics[de-identified] Furosemide    (One of the following REQUIRED for EF <40%/SYSTOLIC FAILURE but MAY be used in EF% >40%/DIASTOLIC FAILURE)        ACE[de-identified] None        ARB[de-identified] Losartan         ARNI[de-identified] None    (Beta Blockers)  NON- Evidenced Based Beta Blocker (for EF% >40%/DIASTOLIC FAILURE): None    Evidenced Based Beta Blocker::(REQUIRED for EF% <40%/SYSTOLIC FAILURE) Metoprolol succinate  . .................................................................................................................................................. Patient's weights and intake/output reviewed: Yes    Patient's Last Weight: 240 lbs obtained by standing scale. Difference of 4 lbs less than last documented weight. Intake/Output Summary (Last 24 hours) at 6/7/2020 1321  Last data filed at 6/7/2020 1230  Gross per 24 hour   Intake 1797.96 ml   Output 3750 ml   Net -1952.04 ml       Comorbidities Reviewed Yes    Patient has a past medical history of Atrial fibrillation ([facility] [address] and Peripheral neuropathy. >>For CHF and Comorbidity documentation on Education Time and Topics, please see Education Tab    Progressive Mobility Assessment:  What is this patient's Current Level of Mobility?: Ambulatory-Up Ad Breann  How was this patient Mobilized today?: Edge of Bed,  Up to Toilet/Shower, and Up in Room                 With Whom? Self                 Level of Difficulty/Assistance: Independent     Pt sitting in bed at this time on room air. Pt denies shortness of breath. Pt with pitting lower extremity edema.      Patient and/or Family's stated Goal of Care this Admission: increase activity tolerance, better understand heart failure and

## 2020-06-07 NOTE — PROGRESS NOTES
[facility]  Cardiology  Progress Note    Admission date:  2020    Reason for follow up visit: afib/schf    HPI/CC: Elle Felton is a 71 y.o. male who presented to the hospital with complaints of shortness of breath, worse with exertion and edema. COVID ruled out. He was noted to be in afib with RVR started on cardizem gtt. Being diuresed with IV Lasix. Echo ordered and showed EF <20%. Telemetry overnight shows atrial fibrillation- rate better controlled 80-100s. Subjective: Up walking around room. Denies chest pain, shortness of breath, palpitations and dizziness. Edema has improved. Vitals:  Blood pressure 112/81, pulse 102, temperature 97.4 °F (36.3 °C), temperature source Oral, resp. rate 18, height 6' 4\" (1.93 m), weight 240 lb 12.8 oz (109.2 kg), SpO2 93 %.   Temp  Av.8 °F (36.6 °C)  Min: 97.4 °F (36.3 °C)  Max: 98.3 °F (36.8 °C)  Pulse  Av.5  Min: 52  Max: 104  BP  Min: 106/75  Max: 130/86  SpO2  Av.8 %  Min: 91 %  Max: 94 %    24 hour I/O    Intake/Output Summary (Last 24 hours) at 2020 1241  Last data filed at 2020 1230  Gross per 24 hour   Intake 1797.96 ml   Output 3750 ml   Net -1952.04 ml     Current Facility-Administered Medications   Medication Dose Route Frequency Provider Last Rate Last Dose    aspirin EC tablet 81 mg  81 mg Oral Daily ZULEMA Roach - CNP   81 mg at 20 0850    cefdinir (OMNICEF) capsule 300 mg  300 mg Oral 2 times per day ZULEMA Gomez CNP   300 mg at 20 0850    metoprolol succinate (TOPROL XL) extended release tablet 25 mg  25 mg Oral Daily ZULEMA Roach - CNP   25 mg at 20 0850    sodium chloride flush 0.9 % injection 10 mL  10 mL Intravenous 2 times per day Phillip Haider MD   10 mL at 20 0850    sodium chloride flush 0.9 % injection 10 mL  10 mL Intravenous PRN Roger Mcmahon MD        perflutren lipid microspheres (DEFINITY) injection 1.65 mg  1.5 mL Intravenous ONCE regurgitation.   -  Estimated peak pulmonary artery systolic pressure was 43 mmHg. -  There was severe tricuspid valvular regurgitation.   -  The right atrium was severely dilated. -  Respirophasic inferior vena cava changes were blunted (less than         50% variation). -  Recommend endocarditis prophylaxis for mitral valve prolapse. IMPRESSIONS   -  Recommend endocarditis prophylaxis for mitral valve prolapse. Lab Reviewed:     Renal Profile:  Lab Results   Component Value Date    CREATININE 1.2 06/07/2020    BUN 17 06/07/2020     06/07/2020    K 3.9 06/07/2020    K 4.0 06/05/2020     06/07/2020    CO2 27 06/07/2020     CBC:    Lab Results   Component Value Date    WBC 8.4 06/04/2020    RBC 4.50 06/04/2020    HGB 15.0 06/04/2020    HCT 43.6 06/04/2020    MCV 97.0 06/04/2020    RDW 13.7 06/04/2020     06/04/2020     BNP:    Lab Results   Component Value Date    PROBNP 877 06/07/2020    PROBNP 2,161 06/04/2020     Fasting Lipid Panel:  No results found for: CHOL, HDL, TRIG  Cardiac Enzymes:  CK/MbTroponin  Lab Results   Component Value Date    TROPONINI <0.01 06/04/2020     PT/ INR No results found for: INR, PROTIME  PTT No results found for: PTT   Lab Results   Component Value Date    MG 2.10 06/07/2020    No results found for: TSH    All labs and imaging reviewed today    Assessment:  1. Atrial fibrillation with RVR: controlled    -[id number] Score for Atrial Fibrillation Stroke Risk 2   -s/p failed mini-maze    - TSH 4.46- normal T4 1.3   -noncompliant with follow up and anticoagulation  2. Acute on chronic systolic congestive heart failure: ongoing- improved    -weights have been stated weight on 6/4 230- actual standing scale 6/6 -244--240 today   -net negative 5,590 since admission   -BNP 2,161 on admission down to 877 today   -edema remains, improved    -continue daily weights, low sodium diet, 2000 ml fluid restriction, and strict I/O  3.  Cardiomyopathy: EF <20 % per echo 6/6, previous EF 35-40% 2007   -Check iron, light chains, MIGUEL, Metanephrines, IGG, IGM, electrophoresis protein, drug screen,  and ETOH level  4. Shortness of breath: resolved  5. MVP: on echo 2007  6. HLD: will add Lipids to am lab work   7. Noncompliance with medication and follow up   8. UTI  9. Dilated ascending aorta: 4.32 cm  10. Mild AR  11. Mild to moderate TR  12. Moderate to severe pulmonic regurgitation   13. Alcohol abuse: he stated usually 1 [place] whisky drink a day, cessation counseled       Plan:   1. Echo results reviewed with patient, plan for LHC/RHC with Dr. Modesta Pate tomorrow. NPO after midnight. DISCUSSION OF CARDIAC CATHETERIZATION PROCEDURES: The procedures, indications, risks and alternatives have been discussed with the patient and, as appropriate, with the patient's guardian . Risks discussed included, but are not limited to, bleeding, development of blood clots/emboli, damage to blood vessels, renal failure, malignant cardiac arrhythmias, stroke, heart attack, emergent coronary bypass surgery, death, dye allergy. The patient (and guardian as appropriate) expressed understanding of the aforementioned and wished to proceed. 3. Continue losartan, toprol, ASA  4. Continue Lovenox at this time, then transition to NOAC pending if any procedures- pt was refusing, I educated him yesterday in regard to importance of anticoagulation he did take last nights dose and this am.  Hold Lovenox in am prior to [facility]   5. Continue Lasix IV 40 mg BID   6. Continue daily weights, low sodium diet, 2000 ml fluid restriction, and strict I/O  7. Continue to monitor telemetry   8.  Lipid in am     Juda Frankel, APRN - [address]  [phone]

## 2020-06-07 NOTE — PROGRESS NOTES
Hospitalist Progress Note      PCP: Kenneth Keith MD    Date of Admission: 6/4/2020    Chief Complaint: SOB, fatigue     Hospital Course:   71 y.o. male who presented to [facility] with report that over the past 2-3 days he has developed shortness of breath and felt weak. He has developed swelling his feet about 3-4 days. He presented to the ED where he was found to be in atrial fibrillation. He reports he had an arrhythmia about 10 years ago - but he doesn't recall the name - but he recalls that p waves were missing. He also reports urinary urgency - having to go suddenly. He had some dysuria a few months ago - after he had a bad cold. Nocturia 2-3 x / night. Upon further discussion - this afib is not new. He was diagnosed with atrial fibrillation about 10 years ago. He was treated over at [facility] and had an ablation, but he said it didn't work. He was started on coumadin but after a while decided he didn't want to take it anymore so stopped taking it. His active problem list in care-everywhere includes atrial fibrillation, sick sinus syndrome, atrial fibrillation with ablation, and cardiomyopathy with an EF of 25%. I'm wondering if there still might be some history he hasn't mentioned- as apixaban is noted in his [facility] care-everywhere as of 11/18/19. He is originally from [place], has lived in the [facility] for 30 years. He speaks [place], but sometimes pauses to try to find the right words. Subjective:   Pt is on RA. Afebrile. VSS. No dyspnea or chest pain. No N/V/D. Still has lower extremity swelling.     Medications:  Reviewed    Infusion Medications     Scheduled Medications    aspirin  81 mg Oral Daily    cefdinir  300 mg Oral 2 times per day    metoprolol succinate  25 mg Oral Daily    sodium chloride flush  10 mL Intravenous 2 times per day    losartan  25 mg Oral Daily    gabapentin  100 mg Oral TID    tamsulosin  0.4 mg Oral Daily    enoxaparin  1 mg/kg Subcutaneous BID    Radiology:  XR CHEST PORTABLE   Final Result   No radiographic evidence of acute pulmonary disease. Assessment/Plan:    Active Hospital Problems    Diagnosis    Atrial fibrillation with rapid ventricular response ([facility]) [I48.91]    Dyspnea [R06.00]    Acute congestive heart failure ([address] [I50.9]       Atrial fibrillation with RVR POA, currently rate controlled:   - S/p failed mini maze procedure. - Pt started on diltiazem drip on admission. Dc'd and changed to Toprol XL.   - Cardiology consulted/following.   - Continue therapeutic Lovenox (pt currently refusing injections). Transition to NOAC when okay from Cards perspective. Pt had previously been on coumadin however he decided to stop taking it. Also on review of his prior meds it appears that he was on Eliquis at one point.   - Continue to monitor on telemetry.   - TSH mildly elevated. Free T4 is normal.     Acute on chronic systolic CHF:   - Last ECHO in 2007 showed LVEF of 35-40%. - Updated ECHO on 6/6/20 shows LVEF < 20%. - Pt started on IV lasix 40 mg BID -- continue. - Monitor daily weights, close I's and O's and daily BMP. - Continue aspirin, BB and ARB. - Possible plan for LHC/RHC and cardiac MRI at some point.      Urinary urgency:   - Abnormal UA. Urine culture grew E. Coli. Pt started on empiric IV rocephin. Changed to PO omnicef on 6/6.  - Pt started on flomax -- continue.   - Post-void residual was unremarkable.      Peripheral neuropathy:   - Continue gabapentin 100 mg po TID.  - B12 and folate were normal.     **COVID19 test was negative on 6/4**       DVT Prophylaxis: Therapeutic Lovenox   Diet: DIET LOW SODIUM 2 GM;  Code Status: Full Code    PT/OT Eval Status: Not indicated    Dispo - Once he is effectively diuresed. ~2 days.     Sheryle Persons, APRN - CNP

## 2020-06-08 ENCOUNTER — APPOINTMENT (OUTPATIENT)
Dept: CARDIAC CATH/INVASIVE PROCEDURES | Age: 70
DRG: 215 | End: 2020-06-08
Payer: MEDICARE

## 2020-06-08 LAB
ALBUMIN SERPL-MCNC: 3.2 G/DL (ref 3.1–4.9)
ALPHA-1-GLOBULIN: 0.2 G/DL (ref 0.2–0.4)
ALPHA-2-GLOBULIN: 0.6 G/DL (ref 0.4–1.1)
ANION GAP SERPL CALCULATED.3IONS-SCNC: 10 MMOL/L (ref 3–16)
APTT: 28.2 SEC (ref 24.2–36.2)
APTT: 75.1 SEC (ref 24.2–36.2)
BETA GLOBULIN: 1.4 G/DL (ref 0.9–1.6)
BUN BLDV-MCNC: 20 MG/DL (ref 7–20)
CALCIUM SERPL-MCNC: 8.8 MG/DL (ref 8.3–10.6)
CHLORIDE BLD-SCNC: 103 MMOL/L (ref 99–110)
CHOLESTEROL, FASTING: 192 MG/DL (ref 0–199)
CHOLESTEROL, TOTAL: 200 MG/DL (ref 0–199)
CO2: 26 MMOL/L (ref 21–32)
CREAT SERPL-MCNC: 1.2 MG/DL (ref 0.8–1.3)
GAMMA GLOBULIN: 1 G/DL (ref 0.6–1.8)
GFR AFRICAN AMERICAN: >60
GFR NON-AFRICAN AMERICAN: 60
GLUCOSE BLD-MCNC: 106 MG/DL (ref 70–99)
HCT VFR BLD CALC: 43.2 % (ref 40.5–52.5)
HCT VFR BLD CALC: 43.8 % (ref 40.5–52.5)
HDLC SERPL-MCNC: 34 MG/DL (ref 40–60)
HDLC SERPL-MCNC: 35 MG/DL (ref 40–60)
HEMOGLOBIN: 14.8 G/DL (ref 13.5–17.5)
HEMOGLOBIN: 14.9 G/DL (ref 13.5–17.5)
INR BLD: 1.08 (ref 0.86–1.14)
LDL CHOLESTEROL CALCULATED: 137 MG/DL
LDL CHOLESTEROL CALCULATED: 139 MG/DL
LEFT VENTRICULAR EJECTION FRACTION HIGH VALUE: 20 %
LEFT VENTRICULAR EJECTION FRACTION MODE: NORMAL
MAGNESIUM: 2.1 MG/DL (ref 1.8–2.4)
MCH RBC QN AUTO: 33.3 PG (ref 26–34)
MCH RBC QN AUTO: 33.6 PG (ref 26–34)
MCHC RBC AUTO-ENTMCNC: 34.1 G/DL (ref 31–36)
MCHC RBC AUTO-ENTMCNC: 34.2 G/DL (ref 31–36)
MCV RBC AUTO: 97.3 FL (ref 80–100)
MCV RBC AUTO: 98.7 FL (ref 80–100)
PDW BLD-RTO: 13.5 % (ref 12.4–15.4)
PDW BLD-RTO: 13.7 % (ref 12.4–15.4)
PLATELET # BLD: 151 K/UL (ref 135–450)
PLATELET # BLD: 169 K/UL (ref 135–450)
PMV BLD AUTO: 7.8 FL (ref 5–10.5)
PMV BLD AUTO: 7.8 FL (ref 5–10.5)
POTASSIUM SERPL-SCNC: 3.9 MMOL/L (ref 3.5–5.1)
PROTHROMBIN TIME: 12.5 SEC (ref 10–13.2)
RBC # BLD: 4.44 M/UL (ref 4.2–5.9)
RBC # BLD: 4.44 M/UL (ref 4.2–5.9)
SODIUM BLD-SCNC: 139 MMOL/L (ref 136–145)
SPE/IFE INTERPRETATION: NORMAL
TOTAL PROTEIN: 6.4 G/DL (ref 6.4–8.2)
TRIGL SERPL-MCNC: 137 MG/DL (ref 0–150)
TRIGLYCERIDE, FASTING: 101 MG/DL (ref 0–150)
URINE ELECTROPHORESIS INTERP: NORMAL
VLDLC SERPL CALC-MCNC: 20 MG/DL
VLDLC SERPL CALC-MCNC: 27 MG/DL
WBC # BLD: 5.9 K/UL (ref 4–11)
WBC # BLD: 6.4 K/UL (ref 4–11)

## 2020-06-08 PROCEDURE — 85730 THROMBOPLASTIN TIME PARTIAL: CPT

## 2020-06-08 PROCEDURE — 6360000002 HC RX W HCPCS: Performed by: NURSE PRACTITIONER

## 2020-06-08 PROCEDURE — 6360000002 HC RX W HCPCS

## 2020-06-08 PROCEDURE — 6370000000 HC RX 637 (ALT 250 FOR IP): Performed by: PEDIATRICS

## 2020-06-08 PROCEDURE — 83735 ASSAY OF MAGNESIUM: CPT

## 2020-06-08 PROCEDURE — 80048 BASIC METABOLIC PNL TOTAL CA: CPT

## 2020-06-08 PROCEDURE — 2700000000 HC OXYGEN THERAPY PER DAY

## 2020-06-08 PROCEDURE — 6370000000 HC RX 637 (ALT 250 FOR IP): Performed by: INTERNAL MEDICINE

## 2020-06-08 PROCEDURE — 99152 MOD SED SAME PHYS/QHP 5/>YRS: CPT | Performed by: INTERNAL MEDICINE

## 2020-06-08 PROCEDURE — C1894 INTRO/SHEATH, NON-LASER: HCPCS

## 2020-06-08 PROCEDURE — 4A023N8 MEASUREMENT OF CARDIAC SAMPLING AND PRESSURE, BILATERAL, PERCUTANEOUS APPROACH: ICD-10-PCS | Performed by: INTERNAL MEDICINE

## 2020-06-08 PROCEDURE — 6370000000 HC RX 637 (ALT 250 FOR IP)

## 2020-06-08 PROCEDURE — 2060000000 HC ICU INTERMEDIATE R&B

## 2020-06-08 PROCEDURE — C1751 CATH, INF, PER/CENT/MIDLINE: HCPCS

## 2020-06-08 PROCEDURE — 99223 1ST HOSP IP/OBS HIGH 75: CPT | Performed by: INTERNAL MEDICINE

## 2020-06-08 PROCEDURE — 2580000003 HC RX 258

## 2020-06-08 PROCEDURE — 85610 PROTHROMBIN TIME: CPT

## 2020-06-08 PROCEDURE — 2709999900 HC NON-CHARGEABLE SUPPLY

## 2020-06-08 PROCEDURE — C1769 GUIDE WIRE: HCPCS

## 2020-06-08 PROCEDURE — 93460 R&L HRT ART/VENTRICLE ANGIO: CPT

## 2020-06-08 PROCEDURE — B2161ZZ FLUOROSCOPY OF RIGHT AND LEFT HEART USING LOW OSMOLAR CONTRAST: ICD-10-PCS | Performed by: INTERNAL MEDICINE

## 2020-06-08 PROCEDURE — 80061 LIPID PANEL: CPT

## 2020-06-08 PROCEDURE — 6360000002 HC RX W HCPCS: Performed by: INTERNAL MEDICINE

## 2020-06-08 PROCEDURE — 2500000003 HC RX 250 WO HCPCS

## 2020-06-08 PROCEDURE — 6370000000 HC RX 637 (ALT 250 FOR IP): Performed by: NURSE PRACTITIONER

## 2020-06-08 PROCEDURE — 94761 N-INVAS EAR/PLS OXIMETRY MLT: CPT

## 2020-06-08 PROCEDURE — B2111ZZ FLUOROSCOPY OF MULTIPLE CORONARY ARTERIES USING LOW OSMOLAR CONTRAST: ICD-10-PCS | Performed by: INTERNAL MEDICINE

## 2020-06-08 PROCEDURE — 2580000003 HC RX 258: Performed by: INTERNAL MEDICINE

## 2020-06-08 PROCEDURE — 6370000000 HC RX 637 (ALT 250 FOR IP): Performed by: REGISTERED NURSE

## 2020-06-08 PROCEDURE — 93460 R&L HRT ART/VENTRICLE ANGIO: CPT | Performed by: INTERNAL MEDICINE

## 2020-06-08 PROCEDURE — 2580000003 HC RX 258: Performed by: PEDIATRICS

## 2020-06-08 PROCEDURE — 36415 COLL VENOUS BLD VENIPUNCTURE: CPT

## 2020-06-08 PROCEDURE — 6360000004 HC RX CONTRAST MEDICATION

## 2020-06-08 PROCEDURE — 85027 COMPLETE CBC AUTOMATED: CPT

## 2020-06-08 RX ORDER — ONDANSETRON 2 MG/ML
4 INJECTION INTRAMUSCULAR; INTRAVENOUS ONCE
Status: COMPLETED | OUTPATIENT
Start: 2020-06-08 | End: 2020-06-08

## 2020-06-08 RX ORDER — ASPIRIN 81 MG/1
243 TABLET, CHEWABLE ORAL ONCE
Status: COMPLETED | OUTPATIENT
Start: 2020-06-08 | End: 2020-06-08

## 2020-06-08 RX ORDER — MIDAZOLAM HYDROCHLORIDE 1 MG/ML
1 INJECTION INTRAMUSCULAR; INTRAVENOUS ONCE
Status: COMPLETED | OUTPATIENT
Start: 2020-06-08 | End: 2020-06-08

## 2020-06-08 RX ORDER — HEPARIN SODIUM 1000 [USP'U]/ML
2000 INJECTION, SOLUTION INTRAVENOUS; SUBCUTANEOUS PRN
Status: DISCONTINUED | OUTPATIENT
Start: 2020-06-08 | End: 2020-06-09 | Stop reason: ALTCHOICE

## 2020-06-08 RX ORDER — FENTANYL CITRATE 50 UG/ML
25 INJECTION, SOLUTION INTRAMUSCULAR; INTRAVENOUS ONCE
Status: COMPLETED | OUTPATIENT
Start: 2020-06-08 | End: 2020-06-08

## 2020-06-08 RX ORDER — HEPARIN SODIUM 1000 [USP'U]/ML
4000 INJECTION, SOLUTION INTRAVENOUS; SUBCUTANEOUS ONCE
Status: COMPLETED | OUTPATIENT
Start: 2020-06-08 | End: 2020-06-08

## 2020-06-08 RX ORDER — HEPARIN SODIUM 1000 [USP'U]/ML
4000 INJECTION, SOLUTION INTRAVENOUS; SUBCUTANEOUS PRN
Status: DISCONTINUED | OUTPATIENT
Start: 2020-06-08 | End: 2020-06-09 | Stop reason: ALTCHOICE

## 2020-06-08 RX ORDER — HEPARIN SODIUM 10000 [USP'U]/100ML
10 INJECTION, SOLUTION INTRAVENOUS CONTINUOUS
Status: DISCONTINUED | OUTPATIENT
Start: 2020-06-08 | End: 2020-06-09

## 2020-06-08 RX ORDER — ROSUVASTATIN CALCIUM 10 MG/1
10 TABLET, COATED ORAL NIGHTLY
Status: DISCONTINUED | OUTPATIENT
Start: 2020-06-08 | End: 2020-06-16 | Stop reason: HOSPADM

## 2020-06-08 RX ORDER — METOPROLOL SUCCINATE 25 MG/1
12.5 TABLET, EXTENDED RELEASE ORAL DAILY
Status: DISCONTINUED | OUTPATIENT
Start: 2020-06-09 | End: 2020-06-10

## 2020-06-08 RX ADMIN — GABAPENTIN 100 MG: 100 CAPSULE ORAL at 09:47

## 2020-06-08 RX ADMIN — MIDAZOLAM HYDROCHLORIDE 1 MG: 1 INJECTION INTRAMUSCULAR; INTRAVENOUS at 11:50

## 2020-06-08 RX ADMIN — ASPIRIN 243 MG: 81 TABLET, CHEWABLE ORAL at 10:52

## 2020-06-08 RX ADMIN — Medication 10 ML: at 20:56

## 2020-06-08 RX ADMIN — GABAPENTIN 100 MG: 100 CAPSULE ORAL at 20:56

## 2020-06-08 RX ADMIN — HEPARIN SODIUM 4000 UNITS: 1000 INJECTION INTRAVENOUS; SUBCUTANEOUS at 18:00

## 2020-06-08 RX ADMIN — ROSUVASTATIN CALCIUM 10 MG: 10 TABLET, FILM COATED ORAL at 20:56

## 2020-06-08 RX ADMIN — LOSARTAN POTASSIUM 25 MG: 25 TABLET, FILM COATED ORAL at 09:47

## 2020-06-08 RX ADMIN — FENTANYL CITRATE 25 MCG: 50 INJECTION, SOLUTION INTRAMUSCULAR; INTRAVENOUS at 11:33

## 2020-06-08 RX ADMIN — HEPARIN SODIUM AND DEXTROSE 10 ML/HR: 10000; 5 INJECTION INTRAVENOUS at 18:00

## 2020-06-08 RX ADMIN — Medication 10 ML: at 09:48

## 2020-06-08 RX ADMIN — ONDANSETRON 4 MG: 2 INJECTION INTRAMUSCULAR; INTRAVENOUS at 14:33

## 2020-06-08 RX ADMIN — ASPIRIN 81 MG: 81 TABLET ORAL at 09:47

## 2020-06-08 RX ADMIN — MELATONIN TAB 5 MG 5 MG: 5 TAB at 21:01

## 2020-06-08 RX ADMIN — METOPROLOL SUCCINATE 25 MG: 25 TABLET, EXTENDED RELEASE ORAL at 09:48

## 2020-06-08 RX ADMIN — CEFDINIR 300 MG: 300 CAPSULE ORAL at 09:47

## 2020-06-08 RX ADMIN — MIDAZOLAM HYDROCHLORIDE 1 MG: 1 INJECTION INTRAMUSCULAR; INTRAVENOUS at 11:33

## 2020-06-08 RX ADMIN — TAMSULOSIN HYDROCHLORIDE 0.4 MG: 0.4 CAPSULE ORAL at 09:47

## 2020-06-08 RX ADMIN — CEFDINIR 300 MG: 300 CAPSULE ORAL at 20:56

## 2020-06-08 RX ADMIN — ENOXAPARIN SODIUM 100 MG: 100 INJECTION SUBCUTANEOUS at 20:57

## 2020-06-08 ASSESSMENT — PAIN SCALES - GENERAL
PAINLEVEL_OUTOF10: 0

## 2020-06-08 NOTE — PROGRESS NOTES
Radiology:  XR CHEST PORTABLE   Final Result   No radiographic evidence of acute pulmonary disease. Assessment/Plan:    Active Hospital Problems    Diagnosis    Atrial fibrillation with rapid ventricular response ([facility]) [I48.91]    Dyspnea [R06.00]    Acute congestive heart failure ([address] [I50.9]       Atrial fibrillation with RVR POA, currently rate controlled:   - S/p failed mini maze procedure. - Pt started on diltiazem drip on admission. Dc'd and changed to Toprol XL.   - Cardiology consulted/following.   - Continue therapeutic Lovenox (pt currently refusing injections). Transition to NOAC when okay from Cards perspective. Pt had previously been on coumadin however he decided to stop taking it. Also on review of his prior meds it appears that he was on Eliquis at one point.   - Continue to monitor on telemetry.   - TSH mildly elevated. Free T4 is normal.     Acute on chronic systolic CHF:   - Last ECHO in 2007 showed LVEF of 35-40%. - Updated ECHO on 6/6/20 shows LVEF < 20%. - Pt started on IV lasix 40 mg BID -- continue. - Monitor daily weights, close I's and O's and daily BMP. - Continue aspirin, BB and ARB. - Possible plan for LHC/RHC and cardiac MRI at some point. CAD: L/R cath 6/8 found to have extensive disease   - plans for attempted PCI with CT surgery on Wed  - Hep gtt      Urinary urgency:   - Abnormal UA. Urine culture grew E. Coli. Pt started on empiric IV rocephin. Changed to PO omnicef on 6/6.  - Pt started on flomax -- continue.   - Post-void residual was unremarkable.      Peripheral neuropathy:   - Continue gabapentin 100 mg po TID.  - B12 and folate were normal.     **COVID19 test was negative on 6/4**       DVT Prophylaxis: Therapeutic Lovenox   Diet: Diet NPO Time Specified  Code Status: Full Code    PT/OT Eval Status: Not indicated    Dispo - Once he is effectively diuresed. ~2 days.     ZULEMA Estrada - CNP

## 2020-06-08 NOTE — CONSULTS
Smoking status: Never Smoker    Smokeless tobacco: Never Used   Substance and Sexual Activity    Alcohol use: Not Currently    Drug use: Not on file    Sexual activity: Not on file   Lifestyle    Physical activity     Days per week: Not on file     Minutes per session: Not on file    Stress: Not on file   Relationships    Social connections     Talks on phone: Not on file     Gets together: Not on file     Attends [employer] service: Not on file     Active member of club or organization: Not on file     Attends meetings of clubs or organizations: Not on file     Relationship status: Not on file    Intimate partner violence     Fear of current or ex partner: Not on file     Emotionally abused: Not on file     Physically abused: Not on file     Forced sexual activity: Not on file   Other Topics Concern    Not on file   Social History Narrative    Not on file       Family History:  History reviewed. No pertinent family history.     Review of Systems:  Reviewed, negative unless noted  Constitutional: weight change, weakness, impairment of ADLs  Eyes: eyestrain, redness, discharges  ENMT: post nasal drip, sinus pain, discharge   Cardiovascular: faintness, vertigo, color changes in fingers/toes  Respiratory: cough, sputum, hemoptysis  GI: excessive thirst, changes in bowel habits, abdominal swelling  : painful urination, pyuria, incontinence  Musculoskeletal: cramps, swelling, limitation of motor activity  Integumentary: cyanosis, changes in skin, dryness  Neurological: paralysis, tingling, tremors  Psychiatric: restlessness, irritability, mood swings  Endocrine: heat/cold intolerance, excessive sweating, hair loss  Hematologic/lymphatic: swollen glands, anemia, easy bruising/bleeding      Physical Examination:    /88   Pulse 80   Temp 97.3 °F (36.3 °C) (Oral)   Resp 16   Ht 6' 4\" (1.93 m)   Wt 237 lb 11.2 oz (107.8 kg)   SpO2 93%   BMI 28.93 kg/m²      BP RUE:  BP LUE:   Admission Weight: 230 lb with no evidence of left ventricular mass or   thrombus noted. Severe global hypokinesis. Left ventricular diastolic filling pressure are indeterminate. Mitral Valve   Mild thickening of both leaflets of mitral valve. MIld mitral regurgitation. Left Atrium   The left atrium is severely dilated. Aortic Valve   The aortic valve is normal in structure. Trileaflet. Mild ( eccentric) aortic regurgitation. Aorta   The aortic root is upper limits of normal in size. The ascending aorta is dilated. Right Ventricle   The right ventricle is normal in size with decreased function. TAPSE is measured at 12 mm. S'' prime velocity is measured at 7.6 cm/s. Tricuspid Valve   The tricuspid valve is normal in structure. Mild to moderate tricuspid regurgitation. Systolic pulmonary artery pressure (SPAP) is normal and estimated at 38 mmHg   (right atrial pressure 15 mmHg). Right Atrium   The right atrium is severely dilated. Right atrial area is 48.5 cm2. Pulmonic Valve   The pulmonic valve is normal in structure. Moderate to severe pulmonic regurgitation. Pericardial Effusion   No pericardial effusion noted. Pleural Effusion   No pleural effusion. Miscellaneous   No obvious masses, thrombi or vegetations are noted. IVC is dilated (> 2.1 cm) and collapses < 50% with respiration consistent   with markedly elevated right atrial pressure (15 mmHg).      M-Mode/2D Measurements (cm)      LV Diastolic Dimension: 5.51 cm LV Systolic Dimension: 5.72 cm   LV Septum Diastolic: 8.44 cm   LV PW Diastolic: 3.97 cm        AO Root Dimension: 3.7 cm                                   AV Cusp Separation: 2 cm                                   LA Dimension: 4.5 cm                                   LA Area: 37.9 cm2                                   LA volume/Index: 141.33 ml /59 ml/m2     Doppler Measurements      AV Peak Velocity: 80.7 cm/s    MV Peak E-Wave: 69.6 cm/s   AV Peak

## 2020-06-08 NOTE — BRIEF OP NOTE
Brief Postoperative Note      Patient: Omari Francois  YOB: 1950  MRN: [id number]    Brief Postoperative Note  Pre-operative Diagnosis: Dilated cardiomyopathy  Post-operative Diagnosis: Same  Procedure: Moderate sedation  US access to Rt femoral artery and vein  RHC  LHC  LVG  Cors    Anesthesia: Moderate Sedation  Surgeons/Assistants: Airam Oliveira MD, [facility]  Estimated Blood Loss: less than 50   Complications: None  Specimens: Was Not Obtained    Findings:     LEFT HEART CATH  Large vessels  LM: luminals  LAD: mid 99% at bifurcation with very large branching septal and small diag. Distal LAD is very small and likely under filled and not able to appreciate   Very faint R-L and L-L  LCX: mid 30-40%   OM1- large vessel mid 30%  RCA: Dominant, prox shelf like 40% lesions, mid 40%   High PDA/PLV bifurcation    LVEDP:8  LVEF: 20% severe global      RIGHT HEART CATH  RA: 8  RV: 30/8  PA:  30/10     and mean of 17  PCWP:  12    Sats: on RA  Ao:  91%  RA: 68%  PA: 68%    CO/CI (Zheng) 6.84/2.87  SVR//47      Assessment  1. Critical mid LAD 95% with Shayan-2 flow and poor distal targets  2. Severe mixed ischemic and nonischemic Cardiomyopathy: LVEF <20%   - LVEF and echo out of proportion to CAD   - Very complex mid LAD lesions   - need to talk with pt and Ct surgery as very high risk PCI   - will consider cangrelor in case case needs to go to OR   - Need Impella 3.2 at minimum but if pt decompensates, would need IMpella 5 or ECMO backup  3. Start heparin ggt 6 hrs after sheath removal  5.  Bb, statin, ACE ARB as tolerated    Note pericardial calcification under floro          Electronically signed by Rachel Rosario MD on 6/8/2020 at 12:04 PM

## 2020-06-08 NOTE — PROGRESS NOTES
Pt off bedrest. Pt is ambulating around the room. Instructed about the use of the IV pole and calling out if needing help.

## 2020-06-08 NOTE — PROGRESS NOTES
Cayla   Daily Cardiovascular Progress Note    Admit Date: 6/4/2020    Chief complaint: SOB  HPI: Improved, no CP      Medications/Labs all Reviewed:  Patient Active Problem List   Diagnosis    Atrial fibrillation with rapid ventricular response (HCC)    Dyspnea    Acute congestive heart failure (HCC)       Medications:  aspirin EC tablet 81 mg, Daily  cefdinir (OMNICEF) capsule 300 mg, 2 times per day  metoprolol succinate (TOPROL XL) extended release tablet 25 mg, Daily  sodium chloride flush 0.9 % injection 10 mL, 2 times per day  sodium chloride flush 0.9 % injection 10 mL, PRN  perflutren lipid microspheres (DEFINITY) injection 1.65 mg, ONCE PRN  losartan (COZAAR) tablet 25 mg, Daily  gabapentin (NEURONTIN) capsule 100 mg, TID  tamsulosin (FLOMAX) capsule 0.4 mg, Daily  enoxaparin (LOVENOX) injection 100 mg, BID  furosemide (LASIX) injection 40 mg, BID  melatonin tablet 5 mg, Nightly PRN           PHYSICAL EXAM   /88   Pulse 80   Temp 97.3 °F (36.3 °C) (Oral)   Resp 16   Ht 6' 4\" (1.93 m)   Wt 237 lb 11.2 oz (107.8 kg)   SpO2 93%   BMI 28.93 kg/m²    Vitals:    06/08/20 0029 06/08/20 0329 06/08/20 0543 06/08/20 0802   BP: 121/74 125/86  119/88   Pulse: 86 91  80   Resp: 18 18  16   Temp: 98 °F (36.7 °C) 97.6 °F (36.4 °C)  97.3 °F (36.3 °C)   TempSrc: Oral Oral  Oral   SpO2: 91% 92%  93%   Weight:   237 lb 11.2 oz (107.8 kg)    Height:             Intake/Output Summary (Last 24 hours) at 6/8/2020 1011  Last data filed at 6/8/2020 0952  Gross per 24 hour   Intake 1200 ml   Output 3700 ml   Net -2500 ml     Wt Readings from Last 3 Encounters:   06/08/20 237 lb 11.2 oz (107.8 kg)         Gen: Patient in NAD, resting comfortably  Neck: No JVD or bruits  Respiratory: CTAB no WRR  Chest: normal without deformity  Cardiovascular:irregular RR, S1S2, no mrg, normal PMI  Abdomen: Soft, NTND, Normal BS  Extremities: No clubbing, cyanosis, or edema  Neurological/Psychiatric:  AxO x4, No day, cessation counseled         Plan:   1. LHC/RHC today with normal cardiac output and filling pressures but critical LAD lesion with poor distal vessel target   - Plan for high risk PCI Wednesday   - Need surgical backup/OR. Anesthesia and intubation, place impella 3.2, Impella 5 and VA ECMO on standby   - Will plan for Cangrelor load during case and heparin   - Lovenox for now, hold Wed AM  2. Cont ASA, cozaar, toprol   - reduce toprol due to bradycardia  3. Aldactone in future  4. Avoid all EToH  5. Afib monitor for burden- 30day outpt monitor  6. Interval echo in 3 months for AsAo and LVEF     - long d/w with Pt and wife regarding options. Poor surgical targets and poor LVEF. D/w pt high risk PCI. Risk of death, emergency surgery, CHF, possible ecmo, larger impella, LVAD. Risk of JENNIFER, CVA, etc. Option of surgical backup or fabina proceeding with medical tx alone. Pt and wife would like to move forward with high risk PCI   In addition, already d/w pt and wife Risks discussed included, but are not limited to, bleeding, development of blood clots/emboli, damage to blood vessels, renal failure, malignant cardiac arrhythmias, stroke, heart attack, emergent coronary bypass surgery, death, dye allergy.  The patient (and guardian as appropriate) expressed understanding of the aforementioned and wished to proceed. Cardiology Consult ([id number]) Total critical care time was 49 minutes, excluding separately reportable procedures. Services, included in critical care time were chart data review, documentation time, obtaining info from patient, review of nursing notes, and vital sign assessment and management of the patient. There was a high probability of clinically significant life-threatening deterioration in the patient's condition, which required my urgent intervention.          Patient Active Problem List   Diagnosis    Atrial fibrillation with rapid ventricular response (HCC)    Dyspnea    Acute congestive heart failure ([facility] [address] [facility]         Thank you for allowing me to participate in the care of your patient. Please call me with any questions [phone].       Fransico Kaiser MD, [address] Cardiologist  [facility]  [phone] [facility]  [phone] [address] [place] [address]  6/8/2020 10:11 AM

## 2020-06-08 NOTE — PROGRESS NOTES
Upon inspection of Right groin site, the site was hard and tender upon palpation. Slight bruising was visible around the site. Pressure was applied and site was massaged for 3 minutes. Site was softer to touch. Patient remains in semi correa position.

## 2020-06-09 ENCOUNTER — ANESTHESIA EVENT (OUTPATIENT)
Dept: CARDIAC CATH/INVASIVE PROCEDURES | Age: 70
DRG: 215 | End: 2020-06-09
Payer: MEDICARE

## 2020-06-09 LAB
ANCA IFA: NORMAL
ANION GAP SERPL CALCULATED.3IONS-SCNC: 10 MMOL/L (ref 3–16)
APTT: 71.5 SEC (ref 24.2–36.2)
APTT: 95 SEC (ref 24.2–36.2)
BUN BLDV-MCNC: 21 MG/DL (ref 7–20)
CALCIUM SERPL-MCNC: 9.1 MG/DL (ref 8.3–10.6)
CHLORIDE BLD-SCNC: 101 MMOL/L (ref 99–110)
CO2: 24 MMOL/L (ref 21–32)
CREAT SERPL-MCNC: 1.2 MG/DL (ref 0.8–1.3)
CYTOMEGALOVIRUS IGG ANTIBODY: >10 U/ML
GFR AFRICAN AMERICAN: >60
GFR NON-AFRICAN AMERICAN: 60
GLUCOSE BLD-MCNC: 121 MG/DL (ref 70–99)
HCT VFR BLD CALC: 43 % (ref 40.5–52.5)
HEMOGLOBIN: 14.7 G/DL (ref 13.5–17.5)
KAPPA, FREE LIGHT CHAINS, SERUM: 28.29 MG/L (ref 3.3–19.4)
KAPPA/LAMBDA RATIO: 1.22 (ref 0.26–1.65)
KAPPA/LAMBDA TEST COMMENT: ABNORMAL
LAMBDA, FREE LIGHT CHAINS, SERUM: 23.1 MG/L (ref 5.71–26.3)
MAGNESIUM: 2.3 MG/DL (ref 1.8–2.4)
MCH RBC QN AUTO: 33.1 PG (ref 26–34)
MCHC RBC AUTO-ENTMCNC: 34.3 G/DL (ref 31–36)
MCV RBC AUTO: 96.5 FL (ref 80–100)
PDW BLD-RTO: 13.7 % (ref 12.4–15.4)
PLATELET # BLD: 171 K/UL (ref 135–450)
PMV BLD AUTO: 8.3 FL (ref 5–10.5)
POTASSIUM REFLEX MAGNESIUM: 4.2 MMOL/L (ref 3.5–5.1)
POTASSIUM SERPL-SCNC: 4.2 MMOL/L (ref 3.5–5.1)
RBC # BLD: 4.46 M/UL (ref 4.2–5.9)
SODIUM BLD-SCNC: 135 MMOL/L (ref 136–145)
WBC # BLD: 7.1 K/UL (ref 4–11)

## 2020-06-09 PROCEDURE — 85730 THROMBOPLASTIN TIME PARTIAL: CPT

## 2020-06-09 PROCEDURE — 6370000000 HC RX 637 (ALT 250 FOR IP): Performed by: INTERNAL MEDICINE

## 2020-06-09 PROCEDURE — 83735 ASSAY OF MAGNESIUM: CPT

## 2020-06-09 PROCEDURE — 2580000003 HC RX 258: Performed by: INTERNAL MEDICINE

## 2020-06-09 PROCEDURE — 6360000002 HC RX W HCPCS: Performed by: PEDIATRICS

## 2020-06-09 PROCEDURE — 80048 BASIC METABOLIC PNL TOTAL CA: CPT

## 2020-06-09 PROCEDURE — 2060000000 HC ICU INTERMEDIATE R&B

## 2020-06-09 PROCEDURE — 6360000002 HC RX W HCPCS: Performed by: INTERNAL MEDICINE

## 2020-06-09 PROCEDURE — 36415 COLL VENOUS BLD VENIPUNCTURE: CPT

## 2020-06-09 PROCEDURE — 85027 COMPLETE CBC AUTOMATED: CPT

## 2020-06-09 RX ORDER — HEPARIN SODIUM 10000 [USP'U]/100ML
6.2 INJECTION, SOLUTION INTRAVENOUS CONTINUOUS
Status: DISCONTINUED | OUTPATIENT
Start: 2020-06-09 | End: 2020-06-09

## 2020-06-09 RX ADMIN — GABAPENTIN 100 MG: 100 CAPSULE ORAL at 21:04

## 2020-06-09 RX ADMIN — ROSUVASTATIN CALCIUM 10 MG: 10 TABLET, FILM COATED ORAL at 21:04

## 2020-06-09 RX ADMIN — HEPARIN SODIUM AND DEXTROSE 6.2 ML/HR: 10000; 5 INJECTION INTRAVENOUS at 06:20

## 2020-06-09 RX ADMIN — LOSARTAN POTASSIUM 25 MG: 25 TABLET, FILM COATED ORAL at 10:21

## 2020-06-09 RX ADMIN — ENOXAPARIN SODIUM 100 MG: 100 INJECTION SUBCUTANEOUS at 10:21

## 2020-06-09 RX ADMIN — GABAPENTIN 100 MG: 100 CAPSULE ORAL at 15:29

## 2020-06-09 RX ADMIN — TAMSULOSIN HYDROCHLORIDE 0.4 MG: 0.4 CAPSULE ORAL at 10:18

## 2020-06-09 RX ADMIN — ASPIRIN 81 MG: 81 TABLET ORAL at 10:18

## 2020-06-09 RX ADMIN — MELATONIN TAB 5 MG 5 MG: 5 TAB at 21:07

## 2020-06-09 RX ADMIN — Medication 10 ML: at 21:04

## 2020-06-09 RX ADMIN — CEFDINIR 300 MG: 300 CAPSULE ORAL at 21:04

## 2020-06-09 RX ADMIN — ENOXAPARIN SODIUM 100 MG: 100 INJECTION SUBCUTANEOUS at 21:04

## 2020-06-09 RX ADMIN — METOPROLOL SUCCINATE 12.5 MG: 50 TABLET, EXTENDED RELEASE ORAL at 10:18

## 2020-06-09 RX ADMIN — ENOXAPARIN SODIUM 100 MG: 100 INJECTION SUBCUTANEOUS at 10:19

## 2020-06-09 RX ADMIN — Medication 10 ML: at 10:19

## 2020-06-09 RX ADMIN — CEFDINIR 300 MG: 300 CAPSULE ORAL at 10:18

## 2020-06-09 RX ADMIN — GABAPENTIN 100 MG: 100 CAPSULE ORAL at 10:18

## 2020-06-09 ASSESSMENT — PAIN SCALES - GENERAL
PAINLEVEL_OUTOF10: 0

## 2020-06-09 NOTE — FLOWSHEET NOTE
06/08/20 1939   Assessment   Charting Type Shift assessment   Neurological   Neuro (WDL) WDL   Level of Consciousness 0   Baxter Coma Scale   Eye Opening 4   Best Verbal Response 5   Best Motor Response 6   Madonna Coma Scale Score 15   NIH/MNHISS Stroke Scale   NIH/MNIHSS Stroke Scale Assessed No   HEENT   HEENT (WDL) X   Right Eye Impaired vision   Left Eye Impaired vision   Respiratory   Respiratory (WDL) WDL   Respiratory Pattern Regular   Respiratory Depth Normal   Respiratory Quality/Effort Unlabored   Chest Assessment Chest expansion symmetrical;Trachea midline   L Breath Sounds Diminished   R Breath Sounds Diminished   Cardiac   Cardiac (WDL) X   Cardiac Regularity Irregular   Heart Sounds S1, S2   Cardiac Rhythm Atrial fibrillation   Rhythm Interpretation   Pulse 86   Cardiac Monitor   Telemetry Monitor On Yes   Telemetry Audible Yes   Telemetry Alarms Set Yes   Telemetry Box Number 8   Gastrointestinal   Abdominal (WDL) WDL   Abdomen Inspection Soft;Rounded   Last BM (including prior to admit) 06/08/20   Tenderness Soft; No guarding;Nontender   RUQ Bowel Sounds Active; Audible   LUQ Bowel Sounds Active; Audible   RLQ Bowel Sounds Active; Audible   LLQ Bowel Sounds Active; Audible   Peripheral Vascular   Peripheral Vascular (WDL) X   Edema Right lower extremity; Left lower extremity   RLE Edema +1;Pitting   LLE Edema +1;Pitting   Edema   Scleral Edema Left None   Scleral Edema Right None   RUE Neurovascular Assessment   Capillary Refill Less than/equal to 3 seconds   Color Appropriate for ethnicity   Temperature Warm   Sensation RUE Full sensation   R Radial Pulse +2   LUE Neurovascular Assessment   Capillary Refill Less than/equal to 3 seconds   Color Appropriate for ethnicity   Temperature Warm   Sensation LUE Full sensation   L Radial Pulse +2   RLE Neurovascular Assessment   Capillary Refill Less than/equal to 3 seconds   Color Red;Pink   Temperature Warm   Sensation RLE Full sensation  (Pt reports

## 2020-06-09 NOTE — PROGRESS NOTES
Negative 06/04/2020    SPECGRAV 1.015 06/04/2020    GLUCOSEU Negative 06/04/2020       Radiology:  XR CHEST PORTABLE   Final Result   No radiographic evidence of acute pulmonary disease. Assessment/Plan:    Active Hospital Problems    Diagnosis    Dilated cardiomyopathy ([facility] [I42.0]    Coronary artery disease involving native coronary artery of native heart without angina pectoris [I25.10]    Dilatation of aorta ([facility]) [I77.819]    ETOH abuse [F10.10]    Atrial fibrillation with rapid ventricular response ([facility]) [I48.91]    Dyspnea [R06.00]    Acute congestive heart failure ([facility] [I50.9]       Atrial fibrillation with RVR POA, currently rate controlled:   - S/p failed mini maze procedure. - Pt started on diltiazem drip on admission. Dc'd and changed to Toprol XL.   - Cardiology consulted/following.   - Continue therapeutic Lovenox (pt currently refusing injections). Transition to NOAC when okay from Cards perspective. Pt had previously been on coumadin however he decided to stop taking it. Also on review of his prior meds it appears that he was on Eliquis at one point.   - Continue to monitor on telemetry.   - TSH mildly elevated. Free T4 is normal.     Acute on chronic systolic CHF:   - Last ECHO in 2007 showed LVEF of 35-40%. - Updated ECHO on 6/6/20 shows LVEF < 20%. - Pt started on IV lasix 40 mg BID -- continue. - Monitor daily weights, close I's and O's and daily BMP. - Continue aspirin, BB and ARB. - Possible plan for LHC/RHC and cardiac MRI at some point. CAD: L/R cath 6/8 found to have extensive disease   - plans for attempted PCI with CT surgery on Wed  - Hep gtt      Urinary urgency:   - Abnormal UA. Urine culture grew E. Coli. Pt started on empiric IV rocephin.  Changed to PO omnicef on 6/6.  - Pt started on flomax -- continue.   - Post-void residual was unremarkable.      Peripheral neuropathy:   - Continue gabapentin 100 mg po TID.  - B12 and folate were normal. **COVID19 test was negative on 6/4**       DVT Prophylaxis: Therapeutic Lovenox   Diet: DIET CARDIAC;   Diet NPO Time Specified  Code Status: Full Code    PT/OT Eval Status: Not indicated    Dispo - Likely here to at minimum Thursday or Friday    Jenifer Blackmon, ZULEMA - CNP

## 2020-06-09 NOTE — PLAN OF CARE
Problem: Infection:  Goal: Will remain free from infection  Description: Will remain free from infection  Outcome: Ongoing     Problem: Safety:  Goal: Free from accidental physical injury  Description: Free from accidental physical injury  Outcome: Ongoing  Goal: Free from intentional harm  Description: Free from intentional harm  Outcome: Ongoing     Problem: Daily Care:  Goal: Daily care needs are met  Description: Daily care needs are met  Outcome: Ongoing     Problem: Pain:  Goal: Patient's pain/discomfort is manageable  Description: Patient's pain/discomfort is manageable  Outcome: Ongoing     Problem: Skin Integrity:  Goal: Skin integrity will stabilize  Description: Skin integrity will stabilize  Outcome: Ongoing     Problem: Discharge Planning:  Goal: Patients continuum of care needs are met  Description: Patients continuum of care needs are met  Outcome: Ongoing     Problem: OXYGENATION/RESPIRATORY FUNCTION  Goal: Patient will maintain patent airway  Outcome: Ongoing  Goal: Patient will achieve/maintain normal respiratory rate/effort  Description: Respiratory rate and effort will be within normal limits for the patient  Outcome: Ongoing     Problem: FLUID AND ELECTROLYTE IMBALANCE  Goal: Fluid and electrolyte balance are achieved/maintained  Outcome: Ongoing     Problem: Falls - Risk of:  Goal: Will remain free from falls  Description: Will remain free from falls  Outcome: Ongoing  Goal: Absence of physical injury  Description: Absence of physical injury  Outcome: Ongoing     Problem: Bleeding:  Goal: Will show no signs and symptoms of excessive bleeding  Description: Will show no signs and symptoms of excessive bleeding  Outcome: Ongoing

## 2020-06-10 ENCOUNTER — APPOINTMENT (OUTPATIENT)
Dept: GENERAL RADIOLOGY | Age: 70
DRG: 215 | End: 2020-06-10
Payer: MEDICARE

## 2020-06-10 ENCOUNTER — ANESTHESIA (OUTPATIENT)
Dept: CARDIAC CATH/INVASIVE PROCEDURES | Age: 70
DRG: 215 | End: 2020-06-10
Payer: MEDICARE

## 2020-06-10 ENCOUNTER — APPOINTMENT (OUTPATIENT)
Dept: CARDIAC CATH/INVASIVE PROCEDURES | Age: 70
DRG: 215 | End: 2020-06-10
Payer: MEDICARE

## 2020-06-10 VITALS
OXYGEN SATURATION: 98 % | RESPIRATION RATE: 11 BRPM | SYSTOLIC BLOOD PRESSURE: 102 MMHG | TEMPERATURE: 96.6 F | DIASTOLIC BLOOD PRESSURE: 71 MMHG

## 2020-06-10 LAB
APTT: 102.8 SEC (ref 24.2–36.2)
APTT: 60 SEC (ref 24.2–36.2)
BASE EXCESS ARTERIAL: -7 (ref -3–3)
CHOLESTEROL, TOTAL: 107 MG/DL (ref 0–199)
EKG ATRIAL RATE: 326 BPM
EKG DIAGNOSIS: NORMAL
EKG Q-T INTERVAL: 380 MS
EKG QRS DURATION: 102 MS
EKG QTC CALCULATION (BAZETT): 459 MS
EKG R AXIS: 12 DEGREES
EKG T AXIS: 233 DEGREES
EKG VENTRICULAR RATE: 88 BPM
GLUCOSE BLD-MCNC: 85 MG/DL (ref 70–99)
HCO3 ARTERIAL: 18.1 MMOL/L (ref 21–29)
HDLC SERPL-MCNC: 24 MG/DL (ref 40–60)
LDL CHOLESTEROL CALCULATED: 68 MG/DL
METANEPH/PLASMA INTERP: ABNORMAL
METANEPHRINE FREE PLASMA: 0.18 NMOL/L (ref 0–0.49)
NORMETANEPHRINE FREE PLASMA: 1.02 NMOL/L (ref 0–0.89)
O2 SAT, ARTERIAL: 96 % (ref 93–100)
PCO2 ARTERIAL: 29.4 MM HG (ref 35–45)
PERFORMED ON: ABNORMAL
PH ARTERIAL: 7.4 (ref 7.35–7.45)
PLATELET # BLD: 147 K/UL (ref 135–450)
PO2 ARTERIAL: 82.8 MM HG (ref 75–108)
POC ACT LR: 260 SEC
POC ACT LR: 261 SEC
POC ACT LR: 323 SEC
POC SAMPLE TYPE: ABNORMAL
PRO-BNP: 773 PG/ML (ref 0–124)
TCO2 ARTERIAL: 19 MMOL/L
TRIGL SERPL-MCNC: 75 MG/DL (ref 0–150)
VLDLC SERPL CALC-MCNC: 15 MG/DL

## 2020-06-10 PROCEDURE — 2580000003 HC RX 258: Performed by: INTERNAL MEDICINE

## 2020-06-10 PROCEDURE — C1894 INTRO/SHEATH, NON-LASER: HCPCS

## 2020-06-10 PROCEDURE — C9113 INJ PANTOPRAZOLE SODIUM, VIA: HCPCS | Performed by: INTERNAL MEDICINE

## 2020-06-10 PROCEDURE — 2000000000 HC ICU R&B

## 2020-06-10 PROCEDURE — 6360000002 HC RX W HCPCS: Performed by: INTERNAL MEDICINE

## 2020-06-10 PROCEDURE — 92928 PRQ TCAT PLMT NTRAC ST 1 LES: CPT | Performed by: INTERNAL MEDICINE

## 2020-06-10 PROCEDURE — 6370000000 HC RX 637 (ALT 250 FOR IP)

## 2020-06-10 PROCEDURE — 6360000002 HC RX W HCPCS: Performed by: NURSE ANESTHETIST, CERTIFIED REGISTERED

## 2020-06-10 PROCEDURE — 74018 RADEX ABDOMEN 1 VIEW: CPT

## 2020-06-10 PROCEDURE — 027034Z DILATION OF CORONARY ARTERY, ONE ARTERY WITH DRUG-ELUTING INTRALUMINAL DEVICE, PERCUTANEOUS APPROACH: ICD-10-PCS | Performed by: INTERNAL MEDICINE

## 2020-06-10 PROCEDURE — 02HA3RZ INSERTION OF SHORT-TERM EXTERNAL HEART ASSIST SYSTEM INTO HEART, PERCUTANEOUS APPROACH: ICD-10-PCS | Performed by: INTERNAL MEDICINE

## 2020-06-10 PROCEDURE — 33990 INSJ PERQ VAD L HRT ARTERIAL: CPT

## 2020-06-10 PROCEDURE — 2500000003 HC RX 250 WO HCPCS

## 2020-06-10 PROCEDURE — C1725 CATH, TRANSLUMIN NON-LASER: HCPCS

## 2020-06-10 PROCEDURE — C9600 PERC DRUG-EL COR STENT SING: HCPCS

## 2020-06-10 PROCEDURE — 2709999900 HC NON-CHARGEABLE SUPPLY

## 2020-06-10 PROCEDURE — 82947 ASSAY GLUCOSE BLOOD QUANT: CPT

## 2020-06-10 PROCEDURE — C1753 CATH, INTRAVAS ULTRASOUND: HCPCS

## 2020-06-10 PROCEDURE — 92978 ENDOLUMINL IVUS OCT C 1ST: CPT | Performed by: INTERNAL MEDICINE

## 2020-06-10 PROCEDURE — 94750 HC PULMONARY COMPLIANCE STUDY: CPT

## 2020-06-10 PROCEDURE — 36415 COLL VENOUS BLD VENIPUNCTURE: CPT

## 2020-06-10 PROCEDURE — 75630 X-RAY AORTA LEG ARTERIES: CPT | Performed by: INTERNAL MEDICINE

## 2020-06-10 PROCEDURE — C9460 INJECTION, CANGRELOR: HCPCS | Performed by: INTERNAL MEDICINE

## 2020-06-10 PROCEDURE — C1769 GUIDE WIRE: HCPCS

## 2020-06-10 PROCEDURE — 93454 CORONARY ARTERY ANGIO S&I: CPT | Performed by: INTERNAL MEDICINE

## 2020-06-10 PROCEDURE — 2700000000 HC OXYGEN THERAPY PER DAY

## 2020-06-10 PROCEDURE — 37799 UNLISTED PX VASCULAR SURGERY: CPT

## 2020-06-10 PROCEDURE — 6370000000 HC RX 637 (ALT 250 FOR IP): Performed by: INTERNAL MEDICINE

## 2020-06-10 PROCEDURE — 2720000010 HC SURG SUPPLY STERILE

## 2020-06-10 PROCEDURE — 93005 ELECTROCARDIOGRAM TRACING: CPT | Performed by: INTERNAL MEDICINE

## 2020-06-10 PROCEDURE — 85049 AUTOMATED PLATELET COUNT: CPT

## 2020-06-10 PROCEDURE — 85347 COAGULATION TIME ACTIVATED: CPT

## 2020-06-10 PROCEDURE — C1887 CATHETER, GUIDING: HCPCS

## 2020-06-10 PROCEDURE — 0BH17EZ INSERTION OF ENDOTRACHEAL AIRWAY INTO TRACHEA, VIA NATURAL OR ARTIFICIAL OPENING: ICD-10-PCS | Performed by: INTERNAL MEDICINE

## 2020-06-10 PROCEDURE — 93010 ELECTROCARDIOGRAM REPORT: CPT | Performed by: INTERNAL MEDICINE

## 2020-06-10 PROCEDURE — 2580000003 HC RX 258: Performed by: ANESTHESIOLOGY

## 2020-06-10 PROCEDURE — 83880 ASSAY OF NATRIURETIC PEPTIDE: CPT

## 2020-06-10 PROCEDURE — 94002 VENT MGMT INPAT INIT DAY: CPT

## 2020-06-10 PROCEDURE — 3700000001 HC ADD 15 MINUTES (ANESTHESIA)

## 2020-06-10 PROCEDURE — 85730 THROMBOPLASTIN TIME PARTIAL: CPT

## 2020-06-10 PROCEDURE — 6360000002 HC RX W HCPCS

## 2020-06-10 PROCEDURE — 92978 ENDOLUMINL IVUS OCT C 1ST: CPT

## 2020-06-10 PROCEDURE — 80061 LIPID PANEL: CPT

## 2020-06-10 PROCEDURE — 71045 X-RAY EXAM CHEST 1 VIEW: CPT

## 2020-06-10 PROCEDURE — C1760 CLOSURE DEV, VASC: HCPCS

## 2020-06-10 PROCEDURE — 82803 BLOOD GASES ANY COMBINATION: CPT

## 2020-06-10 PROCEDURE — B41G1ZZ FLUOROSCOPY OF LEFT LOWER EXTREMITY ARTERIES USING LOW OSMOLAR CONTRAST: ICD-10-PCS | Performed by: INTERNAL MEDICINE

## 2020-06-10 PROCEDURE — 2580000003 HC RX 258: Performed by: NURSE ANESTHETIST, CERTIFIED REGISTERED

## 2020-06-10 PROCEDURE — C1874 STENT, COATED/COV W/DEL SYS: HCPCS

## 2020-06-10 PROCEDURE — 2500000003 HC RX 250 WO HCPCS: Performed by: NURSE ANESTHETIST, CERTIFIED REGISTERED

## 2020-06-10 PROCEDURE — 2580000003 HC RX 258

## 2020-06-10 PROCEDURE — 5A1945Z RESPIRATORY VENTILATION, 24-96 CONSECUTIVE HOURS: ICD-10-PCS | Performed by: INTERNAL MEDICINE

## 2020-06-10 PROCEDURE — 99233 SBSQ HOSP IP/OBS HIGH 50: CPT | Performed by: INTERNAL MEDICINE

## 2020-06-10 PROCEDURE — 94761 N-INVAS EAR/PLS OXIMETRY MLT: CPT

## 2020-06-10 PROCEDURE — 33990 INSJ PERQ VAD L HRT ARTERIAL: CPT | Performed by: INTERNAL MEDICINE

## 2020-06-10 PROCEDURE — 5A0221D ASSISTANCE WITH CARDIAC OUTPUT USING IMPELLER PUMP, CONTINUOUS: ICD-10-PCS | Performed by: INTERNAL MEDICINE

## 2020-06-10 PROCEDURE — 99291 CRITICAL CARE FIRST HOUR: CPT | Performed by: INTERNAL MEDICINE

## 2020-06-10 PROCEDURE — 6370000000 HC RX 637 (ALT 250 FOR IP): Performed by: NURSE PRACTITIONER

## 2020-06-10 PROCEDURE — 3700000000 HC ANESTHESIA ATTENDED CARE

## 2020-06-10 RX ORDER — HEPARIN SODIUM 10000 [USP'U]/100ML
12 INJECTION, SOLUTION INTRAVENOUS CONTINUOUS
Status: DISCONTINUED | OUTPATIENT
Start: 2020-06-10 | End: 2020-06-11

## 2020-06-10 RX ORDER — MORPHINE SULFATE 4 MG/ML
1 INJECTION, SOLUTION INTRAMUSCULAR; INTRAVENOUS EVERY 5 MIN PRN
Status: DISCONTINUED | OUTPATIENT
Start: 2020-06-10 | End: 2020-06-10

## 2020-06-10 RX ORDER — HEPARIN SODIUM 1000 [USP'U]/ML
60 INJECTION, SOLUTION INTRAVENOUS; SUBCUTANEOUS PRN
Status: DISCONTINUED | OUTPATIENT
Start: 2020-06-10 | End: 2020-06-11

## 2020-06-10 RX ORDER — HEPARIN SODIUM 1000 [USP'U]/ML
60 INJECTION, SOLUTION INTRAVENOUS; SUBCUTANEOUS ONCE
Status: DISCONTINUED | OUTPATIENT
Start: 2020-06-10 | End: 2020-06-10 | Stop reason: ALTCHOICE

## 2020-06-10 RX ORDER — SODIUM CHLORIDE 0.9 % (FLUSH) 0.9 %
10 SYRINGE (ML) INJECTION PRN
Status: DISCONTINUED | OUTPATIENT
Start: 2020-06-10 | End: 2020-06-10

## 2020-06-10 RX ORDER — EPTIFIBATIDE 0.75 MG/ML
INJECTION, SOLUTION INTRAVENOUS CONTINUOUS PRN
Status: DISCONTINUED | OUTPATIENT
Start: 2020-06-10 | End: 2020-06-12

## 2020-06-10 RX ORDER — ETOMIDATE 2 MG/ML
INJECTION INTRAVENOUS PRN
Status: DISCONTINUED | OUTPATIENT
Start: 2020-06-10 | End: 2020-06-10 | Stop reason: SDUPTHER

## 2020-06-10 RX ORDER — PROPOFOL 10 MG/ML
INJECTION, EMULSION INTRAVENOUS CONTINUOUS PRN
Status: DISCONTINUED | OUTPATIENT
Start: 2020-06-10 | End: 2020-06-10 | Stop reason: SDUPTHER

## 2020-06-10 RX ORDER — FENTANYL CITRATE 50 UG/ML
INJECTION, SOLUTION INTRAMUSCULAR; INTRAVENOUS PRN
Status: DISCONTINUED | OUTPATIENT
Start: 2020-06-10 | End: 2020-06-10 | Stop reason: SDUPTHER

## 2020-06-10 RX ORDER — HEPARIN SODIUM 1000 [USP'U]/ML
30 INJECTION, SOLUTION INTRAVENOUS; SUBCUTANEOUS PRN
Status: DISCONTINUED | OUTPATIENT
Start: 2020-06-10 | End: 2020-06-11

## 2020-06-10 RX ORDER — SODIUM CHLORIDE 0.9 % (FLUSH) 0.9 %
10 SYRINGE (ML) INJECTION EVERY 12 HOURS SCHEDULED
Status: DISCONTINUED | OUTPATIENT
Start: 2020-06-10 | End: 2020-06-10

## 2020-06-10 RX ORDER — HEPARIN SODIUM 1000 [USP'U]/ML
INJECTION, SOLUTION INTRAVENOUS; SUBCUTANEOUS PRN
Status: DISCONTINUED | OUTPATIENT
Start: 2020-06-10 | End: 2020-06-10 | Stop reason: SDUPTHER

## 2020-06-10 RX ORDER — CHLORHEXIDINE GLUCONATE 4 G/100ML
SOLUTION TOPICAL ONCE
Status: COMPLETED | OUTPATIENT
Start: 2020-06-10 | End: 2020-06-10

## 2020-06-10 RX ORDER — SODIUM CHLORIDE 9 MG/ML
1000 INJECTION, SOLUTION INTRAVENOUS CONTINUOUS
Status: DISCONTINUED | OUTPATIENT
Start: 2020-06-10 | End: 2020-06-10

## 2020-06-10 RX ORDER — OXYCODONE HYDROCHLORIDE AND ACETAMINOPHEN 5; 325 MG/1; MG/1
2 TABLET ORAL PRN
Status: DISCONTINUED | OUTPATIENT
Start: 2020-06-10 | End: 2020-06-10

## 2020-06-10 RX ORDER — SODIUM CHLORIDE 9 MG/ML
INJECTION, SOLUTION INTRAVENOUS CONTINUOUS PRN
Status: DISCONTINUED | OUTPATIENT
Start: 2020-06-10 | End: 2020-06-10 | Stop reason: SDUPTHER

## 2020-06-10 RX ORDER — ACETAMINOPHEN 325 MG/1
650 TABLET ORAL EVERY 4 HOURS PRN
Status: DISCONTINUED | OUTPATIENT
Start: 2020-06-10 | End: 2020-06-16 | Stop reason: HOSPADM

## 2020-06-10 RX ORDER — PANTOPRAZOLE SODIUM 40 MG/10ML
40 INJECTION, POWDER, LYOPHILIZED, FOR SOLUTION INTRAVENOUS DAILY
Status: DISCONTINUED | OUTPATIENT
Start: 2020-06-10 | End: 2020-06-16 | Stop reason: HOSPADM

## 2020-06-10 RX ORDER — SODIUM CHLORIDE, SODIUM LACTATE, POTASSIUM CHLORIDE, CALCIUM CHLORIDE 600; 310; 30; 20 MG/100ML; MG/100ML; MG/100ML; MG/100ML
INJECTION, SOLUTION INTRAVENOUS CONTINUOUS
Status: DISCONTINUED | OUTPATIENT
Start: 2020-06-10 | End: 2020-06-10

## 2020-06-10 RX ORDER — ONDANSETRON 2 MG/ML
4 INJECTION INTRAMUSCULAR; INTRAVENOUS
Status: DISCONTINUED | OUTPATIENT
Start: 2020-06-10 | End: 2020-06-10

## 2020-06-10 RX ORDER — MEPERIDINE HYDROCHLORIDE 50 MG/ML
12.5 INJECTION INTRAMUSCULAR; INTRAVENOUS; SUBCUTANEOUS EVERY 5 MIN PRN
Status: DISCONTINUED | OUTPATIENT
Start: 2020-06-10 | End: 2020-06-10

## 2020-06-10 RX ORDER — ROCURONIUM BROMIDE 10 MG/ML
INJECTION, SOLUTION INTRAVENOUS PRN
Status: DISCONTINUED | OUTPATIENT
Start: 2020-06-10 | End: 2020-06-10 | Stop reason: SDUPTHER

## 2020-06-10 RX ORDER — PHENYLEPHRINE HCL IN 0.9% NACL 1 MG/10 ML
SYRINGE (ML) INTRAVENOUS PRN
Status: DISCONTINUED | OUTPATIENT
Start: 2020-06-10 | End: 2020-06-10 | Stop reason: SDUPTHER

## 2020-06-10 RX ORDER — MORPHINE SULFATE 4 MG/ML
2 INJECTION, SOLUTION INTRAMUSCULAR; INTRAVENOUS EVERY 5 MIN PRN
Status: DISCONTINUED | OUTPATIENT
Start: 2020-06-10 | End: 2020-06-10

## 2020-06-10 RX ORDER — OXYCODONE HYDROCHLORIDE AND ACETAMINOPHEN 5; 325 MG/1; MG/1
1 TABLET ORAL PRN
Status: DISCONTINUED | OUTPATIENT
Start: 2020-06-10 | End: 2020-06-10

## 2020-06-10 RX ORDER — PROPOFOL 10 MG/ML
10 INJECTION, EMULSION INTRAVENOUS
Status: DISCONTINUED | OUTPATIENT
Start: 2020-06-10 | End: 2020-06-16 | Stop reason: HOSPADM

## 2020-06-10 RX ADMIN — Medication 120 MCG: at 17:10

## 2020-06-10 RX ADMIN — FENTANYL CITRATE 50 MCG: 50 INJECTION INTRAMUSCULAR; INTRAVENOUS at 14:47

## 2020-06-10 RX ADMIN — TAMSULOSIN HYDROCHLORIDE 0.4 MG: 0.4 CAPSULE ORAL at 09:44

## 2020-06-10 RX ADMIN — GABAPENTIN 100 MG: 100 CAPSULE ORAL at 09:43

## 2020-06-10 RX ADMIN — LIDOCAINE HYDROCHLORIDE 50 MG: 10 INJECTION, SOLUTION INFILTRATION; PERINEURAL at 14:47

## 2020-06-10 RX ADMIN — GABAPENTIN 100 MG: 100 CAPSULE ORAL at 20:43

## 2020-06-10 RX ADMIN — PROPOFOL 40 MCG/KG/MIN: 10 INJECTION, EMULSION INTRAVENOUS at 20:39

## 2020-06-10 RX ADMIN — HEPARIN SODIUM 25000 UNITS: 5000 INJECTION, SOLUTION INTRAVENOUS; SUBCUTANEOUS at 19:20

## 2020-06-10 RX ADMIN — ROSUVASTATIN CALCIUM 10 MG: 10 TABLET, FILM COATED ORAL at 20:44

## 2020-06-10 RX ADMIN — CANGRELOR 4 MCG/KG/MIN: 50 INJECTION, POWDER, LYOPHILIZED, FOR SOLUTION INTRAVENOUS at 16:49

## 2020-06-10 RX ADMIN — ROCURONIUM BROMIDE 50 MG: 10 SOLUTION INTRAVENOUS at 15:09

## 2020-06-10 RX ADMIN — METOPROLOL SUCCINATE 12.5 MG: 50 TABLET, EXTENDED RELEASE ORAL at 09:44

## 2020-06-10 RX ADMIN — EPTIFIBATIDE 75000 MCG: 0.75 INJECTION, SOLUTION INTRAVENOUS at 17:53

## 2020-06-10 RX ADMIN — ASPIRIN 81 MG: 81 TABLET ORAL at 09:45

## 2020-06-10 RX ADMIN — SODIUM CHLORIDE: 9 INJECTION, SOLUTION INTRAVENOUS at 17:53

## 2020-06-10 RX ADMIN — ANTISEPTIC SURGICAL SCRUB: 0.04 SOLUTION TOPICAL at 09:44

## 2020-06-10 RX ADMIN — PROPOFOL 50 MCG/KG/MIN: 10 INJECTION, EMULSION INTRAVENOUS at 19:24

## 2020-06-10 RX ADMIN — ETOMIDATE 22 MG: 2 INJECTION, SOLUTION INTRAVENOUS at 14:47

## 2020-06-10 RX ADMIN — CEFDINIR 300 MG: 300 CAPSULE ORAL at 09:44

## 2020-06-10 RX ADMIN — CEFDINIR 300 MG: 300 CAPSULE ORAL at 21:01

## 2020-06-10 RX ADMIN — PHENYLEPHRINE HYDROCHLORIDE 20 MCG/MIN: 10 INJECTION INTRAVENOUS at 17:39

## 2020-06-10 RX ADMIN — SODIUM CHLORIDE 1000 ML: 9 INJECTION, SOLUTION INTRAVENOUS at 13:23

## 2020-06-10 RX ADMIN — LOSARTAN POTASSIUM 25 MG: 25 TABLET, FILM COATED ORAL at 09:44

## 2020-06-10 RX ADMIN — SODIUM CHLORIDE: 9 INJECTION, SOLUTION INTRAVENOUS at 14:37

## 2020-06-10 RX ADMIN — ROCURONIUM BROMIDE 50 MG: 10 SOLUTION INTRAVENOUS at 14:47

## 2020-06-10 RX ADMIN — Medication 10 ML: at 09:44

## 2020-06-10 RX ADMIN — ROCURONIUM BROMIDE 20 MG: 10 SOLUTION INTRAVENOUS at 15:41

## 2020-06-10 RX ADMIN — EPTIFIBATIDE 2 MCG/KG/MIN: 0.75 INJECTION, SOLUTION INTRAVENOUS at 16:23

## 2020-06-10 RX ADMIN — Medication 80 MCG: at 17:09

## 2020-06-10 RX ADMIN — PROPOFOL 50 MCG/KG/MIN: 10 INJECTION, EMULSION INTRAVENOUS at 18:02

## 2020-06-10 RX ADMIN — ROCURONIUM BROMIDE 30 MG: 10 SOLUTION INTRAVENOUS at 16:17

## 2020-06-10 RX ADMIN — FENTANYL CITRATE 50 MCG/HR: 50 INJECTION, SOLUTION INTRAMUSCULAR; INTRAVENOUS at 19:21

## 2020-06-10 RX ADMIN — HEPARIN SODIUM 8000 UNITS: 1000 INJECTION, SOLUTION INTRAVENOUS; SUBCUTANEOUS at 15:43

## 2020-06-10 RX ADMIN — Medication 120 MCG: at 17:46

## 2020-06-10 RX ADMIN — ROCURONIUM BROMIDE 30 MG: 10 SOLUTION INTRAVENOUS at 17:43

## 2020-06-10 RX ADMIN — PANTOPRAZOLE SODIUM 40 MG: 40 INJECTION, POWDER, FOR SOLUTION INTRAVENOUS at 20:42

## 2020-06-10 RX ADMIN — Medication 80 MCG: at 17:19

## 2020-06-10 RX ADMIN — Medication 80 MCG: at 17:32

## 2020-06-10 RX ADMIN — Medication 40 MCG: at 17:08

## 2020-06-10 RX ADMIN — CANGRELOR 4 MCG/KG/MIN: 50 INJECTION, POWDER, LYOPHILIZED, FOR SOLUTION INTRAVENOUS at 17:52

## 2020-06-10 RX ADMIN — Medication 10 ML: at 20:44

## 2020-06-10 ASSESSMENT — PULMONARY FUNCTION TESTS
PIF_VALUE: 19
PIF_VALUE: 20
PIF_VALUE: 21
PIF_VALUE: 22
PIF_VALUE: 21
PIF_VALUE: 19
PIF_VALUE: 19
PIF_VALUE: 21
PIF_VALUE: 19
PIF_VALUE: 21
PIF_VALUE: 20
PIF_VALUE: 21
PIF_VALUE: 20
PIF_VALUE: 21
PIF_VALUE: 20
PIF_VALUE: 21
PIF_VALUE: 20
PIF_VALUE: 19
PIF_VALUE: 23
PIF_VALUE: 0
PIF_VALUE: 21
PIF_VALUE: 0
PIF_VALUE: 21
PIF_VALUE: 19
PIF_VALUE: 21
PIF_VALUE: 21
PIF_VALUE: 20
PIF_VALUE: 17
PIF_VALUE: 0
PIF_VALUE: 19
PIF_VALUE: 20
PIF_VALUE: 21
PIF_VALUE: 0
PIF_VALUE: 19
PIF_VALUE: 17
PIF_VALUE: 20
PIF_VALUE: 17
PIF_VALUE: 20
PIF_VALUE: 20
PIF_VALUE: 21
PIF_VALUE: 19
PIF_VALUE: 20
PIF_VALUE: 21
PIF_VALUE: 1
PIF_VALUE: 21
PIF_VALUE: 21
PIF_VALUE: 18
PIF_VALUE: 0
PIF_VALUE: 17
PIF_VALUE: 19
PIF_VALUE: 20
PIF_VALUE: 20
PIF_VALUE: 2
PIF_VALUE: 21
PIF_VALUE: 2
PIF_VALUE: 21
PIF_VALUE: 20
PIF_VALUE: 1
PIF_VALUE: 20
PIF_VALUE: 20
PIF_VALUE: 21
PIF_VALUE: 17
PIF_VALUE: 21
PIF_VALUE: 20
PIF_VALUE: 22
PIF_VALUE: 20
PIF_VALUE: 21
PIF_VALUE: 18
PIF_VALUE: 21
PIF_VALUE: 22
PIF_VALUE: 6
PIF_VALUE: 20
PIF_VALUE: 20
PIF_VALUE: 19
PIF_VALUE: 20
PIF_VALUE: 21
PIF_VALUE: 22
PIF_VALUE: 21
PIF_VALUE: 20
PIF_VALUE: 0
PIF_VALUE: 21
PIF_VALUE: 20
PIF_VALUE: 19
PIF_VALUE: 20
PIF_VALUE: 20
PIF_VALUE: 17
PIF_VALUE: 20
PIF_VALUE: 21
PIF_VALUE: 20
PIF_VALUE: 25
PIF_VALUE: 18
PIF_VALUE: 21
PIF_VALUE: 21
PIF_VALUE: 20
PIF_VALUE: 20
PIF_VALUE: 21
PIF_VALUE: 20
PIF_VALUE: 19
PIF_VALUE: 21
PIF_VALUE: 21
PIF_VALUE: 20
PIF_VALUE: 21
PIF_VALUE: 16
PIF_VALUE: 21
PIF_VALUE: 20
PIF_VALUE: 20
PIF_VALUE: 6
PIF_VALUE: 20
PIF_VALUE: 21
PIF_VALUE: 20
PIF_VALUE: 21
PIF_VALUE: 17
PIF_VALUE: 21
PIF_VALUE: 20
PIF_VALUE: 0
PIF_VALUE: 20
PIF_VALUE: 20
PIF_VALUE: 17
PIF_VALUE: 21
PIF_VALUE: 20
PIF_VALUE: 17
PIF_VALUE: 19
PIF_VALUE: 21
PIF_VALUE: 21
PIF_VALUE: 18
PIF_VALUE: 21
PIF_VALUE: 20
PIF_VALUE: 21
PIF_VALUE: 20
PIF_VALUE: 19
PIF_VALUE: 20
PIF_VALUE: 19
PIF_VALUE: 20
PIF_VALUE: 21
PIF_VALUE: 20
PIF_VALUE: 20
PIF_VALUE: 21
PIF_VALUE: 22
PIF_VALUE: 21
PIF_VALUE: 21
PIF_VALUE: 20
PIF_VALUE: 20
PIF_VALUE: 21
PIF_VALUE: 22
PIF_VALUE: 21
PIF_VALUE: 20
PIF_VALUE: 21
PIF_VALUE: 0
PIF_VALUE: 20
PIF_VALUE: 19
PIF_VALUE: 21
PIF_VALUE: 21
PIF_VALUE: 20
PIF_VALUE: 21
PIF_VALUE: 20
PIF_VALUE: 21
PIF_VALUE: 20
PIF_VALUE: 21
PIF_VALUE: 21
PIF_VALUE: 18
PIF_VALUE: 21
PIF_VALUE: 0
PIF_VALUE: 21
PIF_VALUE: 20
PIF_VALUE: 20
PIF_VALUE: 19
PIF_VALUE: 16
PIF_VALUE: 21
PIF_VALUE: 22
PIF_VALUE: 21
PIF_VALUE: 22
PIF_VALUE: 20
PIF_VALUE: 24
PIF_VALUE: 21

## 2020-06-10 ASSESSMENT — PAIN SCALES - GENERAL
PAINLEVEL_OUTOF10: 0

## 2020-06-10 ASSESSMENT — ENCOUNTER SYMPTOMS: SHORTNESS OF BREATH: 1

## 2020-06-10 ASSESSMENT — LIFESTYLE VARIABLES: SMOKING_STATUS: 0

## 2020-06-10 NOTE — PROGRESS NOTES
Hospitalist Progress Note      PCP: Esutardo Jackson MD    Date of Admission: 6/4/2020    Chief Complaint: SOB, fatigue      Hospital Course:   71 y. o. male who presented to [facility] with report that over the past 2-3 days he has developed shortness of breath and felt weak. He has developed swelling his feet about 3-4 days. He presented to the ED where he was found to be in atrial fibrillation. He reports he had an arrhythmia about 10 years ago - but he doesn't recall the name - but he recalls that p waves were missing.  He also reports urinary urgency - having to go suddenly. He had some dysuria a few months ago - after he had a bad cold. Nocturia 2-3 x / night. Upon further discussion - this afib is not new. He was diagnosed with atrial fibrillation about 10 years ago. He was treated over at [facility] and had an ablation, but he said it didn't work. He was started on coumadin but after a while decided he didn't want to take it anymore so stopped taking it. His active problem list in care-everywhere includes atrial fibrillation, sick sinus syndrome, atrial fibrillation with ablation, and cardiomyopathy with an EF of 25%. I'm wondering if there still might be some history he hasn't mentioned- as apixaban is noted in his [facility] care-everywhere as of 11/18/19. He is originally from [place], has lived in the [address] [place] [address] Floor for 30 years. He speaks English, but sometimes pauses to try to find the right words.       Subjective: EMR and notes reviewed pt seen and examined. No acute issues overnight. No chest pain or SOB.  awaiting [facility] today, wife at bedside      Medications:  Reviewed    Infusion Medications   Scheduled Medications    chlorhexidine   Topical Once    rosuvastatin  10 mg Oral Nightly    enoxaparin  1 mg/kg Subcutaneous BID    metoprolol succinate  12.5 mg Oral Daily    aspirin  81 mg Oral Daily    cefdinir  300 mg Oral 2 times per day    sodium chloride flush  10 mL Intravenous 2 times per day   

## 2020-06-10 NOTE — ANESTHESIA PROCEDURE NOTES
Arterial Line:    An arterial line was placed using surface landmarks, in the OR for the following indication(s): continuous blood pressure monitoring and blood sampling needed. A 20 gauge (size), 8 cm (length), Arrow and OVER A GUIDE WIRE ON SEC. ATTEMPT (type) catheter was placed, Seldinger technique used, into the right radial artery and POST INDUCTION, secured by Tegaderm and tape and 2 TEGADERMS AND TAPE.   Anesthesia type: General    Additional notes:  PREP POST INDUCTION, CONSENT HAD BEEN OBTAINED PREOP, TIMEOUT W/ CL STAFF, FAILED ARROW( IN ART, NO THREAD), SEC ATTEMPT MORE PROX W/ OPEN NEEDLE/GW/ LONG CATH, GOOD WAVE, SEC W/ TEGA X2 AND TAPE  Anesthesiologist: Candice Geronimo MD  Performed: Anesthesiologist   Preanesthetic Checklist  Completed: patient identified, site marked, pre-op evaluation, timeout performed, IV checked, risks and benefits discussed, monitors and equipment checked, anesthesia consent given, oxygen available and patient being monitored

## 2020-06-10 NOTE — PROGRESS NOTES
06/10/20 1836   Vent Information   $Ventilation $Initial Day   Vent Type 840   Vent Mode AC/VC   Vt Ordered 600 mL   Rate Set 16 bmp   Peak Flow 44 L/min   Pressure Support 0 cmH20   FiO2  50 %   SpO2 96 %   SpO2/FiO2 ratio 192   Sensitivity 3   PEEP/CPAP 5   Humidification Source HME   Vent Patient Data   Peak Inspiratory Pressure 18 cmH2O   Mean Airway Pressure 10 cmH20   Rate Measured 16 br/min   Vt Exhaled 601 mL   Minute Volume 9.67 Liters   I:E Ratio 1:1.50   Spontaneous Breathing Trial (SBT) RT Doc   Pulse 67   Additional Respiratory  Assessments   Resp (!) 36   Alarm Settings   High Pressure Alarm 40 cmH2O   Low Minute Volume Alarm 2 L/min   High Respiratory Rate 40 br/min   ETT (adult)   Placement Date/Time: 06/10/20 1451   Preoxygenation: Yes  Mask Ventilation: Ventilated by mask with oral airway (2)  Technique: Direct laryngoscopy  Type: Cuffed  Tube Size: 8 mm  Laryngoscope: Mac  Blade Size: 4  Location: Oral  Grade View: Full view. ..    Secured at 24 cm   Measured From [address] [place] [address],[address] By Commercial tube rae

## 2020-06-10 NOTE — ANESTHESIA PRE PROCEDURE
Department of Anesthesiology  Preprocedure Note       Name:  Herman Palma   Age:  71 y.o.  :  1950                                          MRN:  [id number]         Date:  6/10/2020      Surgeon: * Surgery not found *    Procedure:     Medications prior to admission:   Prior to Admission medications    Not on File       Current medications:    Current Facility-Administered Medications   Medication Dose Route Frequency Provider Last Rate Last Dose    [facility]) IV bolus from bag 3,303 mcg  30 mcg/kg Intravenous Once Cam Mireles MD        Followed by   Sagrario Colindres [facility]) 50 mg in sodium chloride 0.9 % 250 mL infusion  4 mcg/kg/min Intravenous Continuous Cam Mireles MD        0.9 % sodium chloride infusion  1,000 mL Intravenous Continuous Cam Mireles MD 75 mL/hr at 06/10/20 1323 1,000 mL at 06/10/20 1323    rosuvastatin (CRESTOR) tablet 10 mg  10 mg Oral Nightly Cam Mireles MD   10 mg at 20    metoprolol succinate (TOPROL XL) extended release tablet 12.5 mg  12.5 mg Oral Daily Cam Mireles MD   12.5 mg at 06/10/20 09    aspirin EC tablet 81 mg  81 mg Oral Daily Cam Mireles MD   81 mg at 06/10/20 0945    cefdinir (OMNICEF) capsule 300 mg  300 mg Oral 2 times per day Cam Mireles MD   300 mg at 06/10/20 0944    sodium chloride flush 0.9 % injection 10 mL  10 mL Intravenous 2 times per day Cam Mireles MD   10 mL at 06/10/20 0944    sodium chloride flush 0.9 % injection 10 mL  10 mL Intravenous PRN Cam Mireles MD        perflutren lipid microspheres (DEFINITY) injection 1.65 mg  1.5 mL Intravenous ONCE PRN Cam Mireles MD        losartan (COZAAR) tablet 25 mg  25 mg Oral Daily Cam Mireles MD   25 mg at 06/10/20 09    gabapentin (NEURONTIN) capsule 100 mg  100 mg Oral TID Cam Mireles MD   100 mg at 06/10/20 0943    tamsulosin (FLOMAX) capsule 0.4 mg  0.4 mg Oral Daily Cam Mireles MD   0.4 mg at 06/10/20 0944    melatonin disease, no renal disease and no morbid obesity       Endo/Other:    (+) : arthritis:., no malignancy/cancer. (-) diabetes mellitus, hypothyroidism, hyperthyroidism, blood dyscrasia, no electrolyte abnormalities, no malignancy/cancer               Abdominal:       Abdomen: soft. Vascular: negative vascular ROS. Anesthesia Plan      general     ASA 3     (2 piv , AL for bp and lab monitoring prn , goetz)  Induction: intravenous. MIPS: Postoperative opioids intended and Prophylactic antiemetics administered. Anesthetic plan and risks discussed with patient and spouse. Plan discussed with CRNA. This pre-anesthesia assessment may be used as a history and physical.    DOS STAFF ADDENDUM:    Pt seen and examined, chart reviewed (including anesthesia, drug and allergy history). No interval changes to history and physical examination. Anesthetic plan, risks, benefits, alternatives, and personnel involved discussed with patient. Patient verbalized an understanding and agrees to proceed.       Kate Parker MD  Evangelina 10, 2020  2:07 PM          Kate Parker MD   6/10/2020

## 2020-06-11 ENCOUNTER — APPOINTMENT (OUTPATIENT)
Dept: GENERAL RADIOLOGY | Age: 70
DRG: 215 | End: 2020-06-11
Payer: MEDICARE

## 2020-06-11 LAB
ANION GAP SERPL CALCULATED.3IONS-SCNC: 7 MMOL/L (ref 3–16)
APTT: 47.7 SEC (ref 24.2–36.2)
APTT: 48.3 SEC (ref 24.2–36.2)
APTT: 59.8 SEC (ref 24.2–36.2)
BASE EXCESS ARTERIAL: -1.1 MMOL/L (ref -3–3)
BUN BLDV-MCNC: 17 MG/DL (ref 7–20)
CALCIUM SERPL-MCNC: 7.8 MG/DL (ref 8.3–10.6)
CARBOXYHEMOGLOBIN ARTERIAL: 0.1 % (ref 0–1.5)
CHLORIDE BLD-SCNC: 108 MMOL/L (ref 99–110)
CO2: 22 MMOL/L (ref 21–32)
CREAT SERPL-MCNC: 0.9 MG/DL (ref 0.8–1.3)
EKG ATRIAL RATE: 78 BPM
EKG DIAGNOSIS: NORMAL
EKG Q-T INTERVAL: 450 MS
EKG QRS DURATION: 114 MS
EKG QTC CALCULATION (BAZETT): 489 MS
EKG R AXIS: -7 DEGREES
EKG T AXIS: 186 DEGREES
EKG VENTRICULAR RATE: 71 BPM
GFR AFRICAN AMERICAN: >60
GFR NON-AFRICAN AMERICAN: >60
GLUCOSE BLD-MCNC: 91 MG/DL (ref 70–99)
HCO3 ARTERIAL: 23.2 MMOL/L (ref 21–29)
HCT VFR BLD CALC: 35.4 % (ref 40.5–52.5)
HEMOGLOBIN, ART, EXTENDED: 12.6 G/DL (ref 13.5–17.5)
HEMOGLOBIN: 12 G/DL (ref 13.5–17.5)
MCH RBC QN AUTO: 33 PG (ref 26–34)
MCHC RBC AUTO-ENTMCNC: 33.9 G/DL (ref 31–36)
MCV RBC AUTO: 97.4 FL (ref 80–100)
METHEMOGLOBIN ARTERIAL: 0.5 %
O2 CONTENT ARTERIAL: 17 ML/DL
O2 SAT, ARTERIAL: 93.8 %
O2 THERAPY: ABNORMAL
PCO2 ARTERIAL: 37.3 MMHG (ref 35–45)
PDW BLD-RTO: 13.7 % (ref 12.4–15.4)
PH ARTERIAL: 7.41 (ref 7.35–7.45)
PLATELET # BLD: 130 K/UL (ref 135–450)
PMV BLD AUTO: 7.8 FL (ref 5–10.5)
PO2 ARTERIAL: 69.3 MMHG (ref 75–108)
POC ACT LR: >400 SEC
POTASSIUM REFLEX MAGNESIUM: 4.1 MMOL/L (ref 3.5–5.1)
RBC # BLD: 3.63 M/UL (ref 4.2–5.9)
SODIUM BLD-SCNC: 137 MMOL/L (ref 136–145)
TCO2 ARTERIAL: 24.3 MMOL/L
WBC # BLD: 5.5 K/UL (ref 4–11)

## 2020-06-11 PROCEDURE — 6370000000 HC RX 637 (ALT 250 FOR IP): Performed by: INTERNAL MEDICINE

## 2020-06-11 PROCEDURE — 6360000002 HC RX W HCPCS: Performed by: NURSE PRACTITIONER

## 2020-06-11 PROCEDURE — 02WAXRZ REVISION OF SHORT-TERM EXTERNAL HEART ASSIST SYSTEM IN HEART, EXTERNAL APPROACH: ICD-10-PCS | Performed by: INTERNAL MEDICINE

## 2020-06-11 PROCEDURE — 94770 HC ETCO2 MONITOR DAILY: CPT

## 2020-06-11 PROCEDURE — 2700000000 HC OXYGEN THERAPY PER DAY

## 2020-06-11 PROCEDURE — 94750 HC PULMONARY COMPLIANCE STUDY: CPT

## 2020-06-11 PROCEDURE — 85027 COMPLETE CBC AUTOMATED: CPT

## 2020-06-11 PROCEDURE — 6360000002 HC RX W HCPCS: Performed by: INTERNAL MEDICINE

## 2020-06-11 PROCEDURE — 99233 SBSQ HOSP IP/OBS HIGH 50: CPT | Performed by: INTERNAL MEDICINE

## 2020-06-11 PROCEDURE — 94003 VENT MGMT INPAT SUBQ DAY: CPT

## 2020-06-11 PROCEDURE — 33992 RMVL PERQ LEFT HEART VAD: CPT | Performed by: INTERNAL MEDICINE

## 2020-06-11 PROCEDURE — 2580000003 HC RX 258: Performed by: INTERNAL MEDICINE

## 2020-06-11 PROCEDURE — 71045 X-RAY EXAM CHEST 1 VIEW: CPT

## 2020-06-11 PROCEDURE — 99152 MOD SED SAME PHYS/QHP 5/>YRS: CPT | Performed by: INTERNAL MEDICINE

## 2020-06-11 PROCEDURE — 82803 BLOOD GASES ANY COMBINATION: CPT

## 2020-06-11 PROCEDURE — C9113 INJ PANTOPRAZOLE SODIUM, VIA: HCPCS | Performed by: INTERNAL MEDICINE

## 2020-06-11 PROCEDURE — 02PA3RZ REMOVAL OF SHORT-TERM EXTERNAL HEART ASSIST SYSTEM FROM HEART, PERCUTANEOUS APPROACH: ICD-10-PCS | Performed by: INTERNAL MEDICINE

## 2020-06-11 PROCEDURE — 85347 COAGULATION TIME ACTIVATED: CPT

## 2020-06-11 PROCEDURE — 93460 R&L HRT ART/VENTRICLE ANGIO: CPT | Performed by: INTERNAL MEDICINE

## 2020-06-11 PROCEDURE — 85730 THROMBOPLASTIN TIME PARTIAL: CPT

## 2020-06-11 PROCEDURE — 80048 BASIC METABOLIC PNL TOTAL CA: CPT

## 2020-06-11 PROCEDURE — 93308 TTE F-UP OR LMTD: CPT

## 2020-06-11 PROCEDURE — 94761 N-INVAS EAR/PLS OXIMETRY MLT: CPT

## 2020-06-11 PROCEDURE — 37799 UNLISTED PX VASCULAR SURGERY: CPT

## 2020-06-11 PROCEDURE — 2000000000 HC ICU R&B

## 2020-06-11 PROCEDURE — 99291 CRITICAL CARE FIRST HOUR: CPT | Performed by: INTERNAL MEDICINE

## 2020-06-11 PROCEDURE — 93010 ELECTROCARDIOGRAM REPORT: CPT | Performed by: INTERNAL MEDICINE

## 2020-06-11 RX ORDER — HEPARIN SODIUM 10000 [USP'U]/100ML
2.2 INJECTION, SOLUTION INTRAVENOUS CONTINUOUS
Status: DISCONTINUED | OUTPATIENT
Start: 2020-06-11 | End: 2020-06-11

## 2020-06-11 RX ORDER — ASPIRIN 81 MG/1
81 TABLET, CHEWABLE ORAL DAILY
Status: DISCONTINUED | OUTPATIENT
Start: 2020-06-11 | End: 2020-06-16 | Stop reason: HOSPADM

## 2020-06-11 RX ORDER — CARVEDILOL 3.12 MG/1
3.12 TABLET ORAL 2 TIMES DAILY WITH MEALS
Status: DISCONTINUED | OUTPATIENT
Start: 2020-06-11 | End: 2020-06-15

## 2020-06-11 RX ORDER — FUROSEMIDE 10 MG/ML
40 INJECTION INTRAMUSCULAR; INTRAVENOUS ONCE
Status: COMPLETED | OUTPATIENT
Start: 2020-06-11 | End: 2020-06-11

## 2020-06-11 RX ORDER — CARBOXYMETHYLCELLULOSE SODIUM 10 MG/ML
1 GEL OPHTHALMIC
Status: DISCONTINUED | OUTPATIENT
Start: 2020-06-11 | End: 2020-06-16 | Stop reason: HOSPADM

## 2020-06-11 RX ORDER — CHLORHEXIDINE GLUCONATE 0.12 MG/ML
15 RINSE ORAL 2 TIMES DAILY
Status: DISCONTINUED | OUTPATIENT
Start: 2020-06-11 | End: 2020-06-16 | Stop reason: HOSPADM

## 2020-06-11 RX ADMIN — ROSUVASTATIN CALCIUM 10 MG: 10 TABLET, FILM COATED ORAL at 21:11

## 2020-06-11 RX ADMIN — HEPARIN SODIUM 2.2 ML/HR: 10000 INJECTION, SOLUTION INTRAVENOUS at 07:32

## 2020-06-11 RX ADMIN — GABAPENTIN 100 MG: 100 CAPSULE ORAL at 08:59

## 2020-06-11 RX ADMIN — FUROSEMIDE 40 MG: 10 INJECTION, SOLUTION INTRAMUSCULAR; INTRAVENOUS at 08:59

## 2020-06-11 RX ADMIN — GABAPENTIN 100 MG: 100 CAPSULE ORAL at 14:30

## 2020-06-11 RX ADMIN — CHLORHEXIDINE GLUCONATE 15 ML: 1.2 RINSE ORAL at 09:00

## 2020-06-11 RX ADMIN — CARBOXYMETHYLCELLULOSE SODIUM 1 DROP: 10 GEL OPHTHALMIC at 14:30

## 2020-06-11 RX ADMIN — LOSARTAN POTASSIUM 25 MG: 25 TABLET, FILM COATED ORAL at 08:59

## 2020-06-11 RX ADMIN — PANTOPRAZOLE SODIUM 40 MG: 40 INJECTION, POWDER, FOR SOLUTION INTRAVENOUS at 08:59

## 2020-06-11 RX ADMIN — CEFDINIR 300 MG: 300 CAPSULE ORAL at 09:28

## 2020-06-11 RX ADMIN — TICAGRELOR 90 MG: 90 TABLET ORAL at 21:11

## 2020-06-11 RX ADMIN — PROPOFOL 30 MCG/KG/MIN: 10 INJECTION, EMULSION INTRAVENOUS at 22:55

## 2020-06-11 RX ADMIN — PROPOFOL 20 MCG/KG/MIN: 10 INJECTION, EMULSION INTRAVENOUS at 02:01

## 2020-06-11 RX ADMIN — PROPOFOL 25 MCG/KG/MIN: 10 INJECTION, EMULSION INTRAVENOUS at 14:14

## 2020-06-11 RX ADMIN — PROPOFOL 30 MCG/KG/MIN: 10 INJECTION, EMULSION INTRAVENOUS at 10:30

## 2020-06-11 RX ADMIN — CEFDINIR 300 MG: 300 CAPSULE ORAL at 21:11

## 2020-06-11 RX ADMIN — MUPIROCIN: 20 OINTMENT TOPICAL at 09:01

## 2020-06-11 RX ADMIN — TICAGRELOR 90 MG: 90 TABLET ORAL at 09:00

## 2020-06-11 RX ADMIN — Medication 10 ML: at 09:01

## 2020-06-11 RX ADMIN — Medication 10 ML: at 21:12

## 2020-06-11 RX ADMIN — GABAPENTIN 100 MG: 100 CAPSULE ORAL at 21:11

## 2020-06-11 RX ADMIN — ASPIRIN 81 MG 81 MG: 81 TABLET ORAL at 09:00

## 2020-06-11 RX ADMIN — MUPIROCIN: 20 OINTMENT TOPICAL at 21:11

## 2020-06-11 RX ADMIN — FENTANYL CITRATE 50 MCG/HR: 50 INJECTION, SOLUTION INTRAMUSCULAR; INTRAVENOUS at 12:10

## 2020-06-11 RX ADMIN — CARBOXYMETHYLCELLULOSE SODIUM 1 DROP: 10 GEL OPHTHALMIC at 21:11

## 2020-06-11 RX ADMIN — CHLORHEXIDINE GLUCONATE 15 ML: 1.2 RINSE ORAL at 21:11

## 2020-06-11 RX ADMIN — CARBOXYMETHYLCELLULOSE SODIUM 1 DROP: 10 GEL OPHTHALMIC at 16:42

## 2020-06-11 ASSESSMENT — PULMONARY FUNCTION TESTS
PIF_VALUE: 18
PIF_VALUE: 13
PIF_VALUE: 15
PIF_VALUE: 15
PIF_VALUE: 13
PIF_VALUE: 17
PIF_VALUE: 15
PIF_VALUE: 17
PIF_VALUE: 17
PIF_VALUE: 15
PIF_VALUE: 15
PIF_VALUE: 16
PIF_VALUE: 17
PIF_VALUE: 16
PIF_VALUE: 18
PIF_VALUE: 17
PIF_VALUE: 17
PIF_VALUE: 16
PIF_VALUE: 17
PIF_VALUE: 15
PIF_VALUE: 16
PIF_VALUE: 19
PIF_VALUE: 16
PIF_VALUE: 18
PIF_VALUE: 15
PIF_VALUE: 19
PIF_VALUE: 17
PIF_VALUE: 16
PIF_VALUE: 15
PIF_VALUE: 15
PIF_VALUE: 17
PIF_VALUE: 16
PIF_VALUE: 14
PIF_VALUE: 18
PIF_VALUE: 17
PIF_VALUE: 14
PIF_VALUE: 17
PIF_VALUE: 14
PIF_VALUE: 19

## 2020-06-11 ASSESSMENT — PAIN SCALES - GENERAL
PAINLEVEL_OUTOF10: 0

## 2020-06-11 NOTE — PROCEDURES
[facility]                            CARDIAC CATHETERIZATION    PATIENT NAME: Genie Rhoades                      :        1950  MED REC NO:   [id number]                          ROOM:       7862  ACCOUNT NO:   [de-identified]                           ADMIT DATE: 2020  PROVIDER:     Caitlin Berkowitz MD    DATE OF PROCEDURE:  06/10/2020    INVASIVE CARDIAC PROCEDURE NOTE    PROCEDURES PERFORMED:  1.  CPT code 49067, there was insertion of a short-term external heart  assist system in the heart using percutaneous approach. Impella 3.2  2. Assistance with cardiac output using Impella pump, continuous. 3.  Ultrasound-guided access to right femoral artery, CPT code 93998. Ultrasound access was used to access the right femoral artery using  Seldinger's technique after local infiltration with 1% lidocaine. Ultrasound was used to visualize patency of the artery, pulsatility and  exact location for puncture. Site was determined to be okay and we  proceeded. Images were printed off from the printer and stored in the  progress note and sent to medical records. 4.  General anesthesia and intubation provided by our Anesthesia team.  5.  Placement of 14-Tunisian Impella sheath via right femoral artery and  then placement of a 6-Tunisian sheath inside of the diaphragm of the  Impella sheath for single hole access. 6.  Limited right and left iliofemoral angiography,  nonselective. 7.  Coronary angiography of left coronary artery system. 8.  Balloon angioplasty and percutaneous intervention to the mid LAD. 9.  OCT to the LAD. INDICATION FOR PROCEDURE:  The patient is a 54-year-old male with a  history of systolic cardiomyopathy, EF was 35% in . He presented  with atrial fibrillation and heart failure, which was treated.   He was  brought to the cardiac cath lab yesterday and found to have good filling  pressures and cardiac output, but was found to have a critical mid-LAD  lesion of about 95 to 99%. This was KATHY-2 in nature. It was a large  septal, but very poor filling and somewhat atretic mid distal LAD. The  case was stopped at that time and there was a long discussion between  myself and the patient, his wife and the CT Surgery team about how the  best received, given that there was poor distal targets for surgical  revascularization. [place] decision was made to proceed with high risk  percutaneous intervention and the patient was brought to the cath lab  table today for the said procedure. DETAILS OF PROCEDURE:  The patient was brought to the cardiac cath lab  and was intubated and placed under general anesthesia by our Anesthesia  Team.  He was prepped and draped in the usual sterile fashion. At that  point, I used ultrasound-guidance to get into the patient's right  femoral artery. A 6-Panamanian sheath was inserted and we took angiographic  pictures of the right iliofemoral region. A pigtail catheter was then  used and placed in the distal aorta and we did angiographic pictures  with runoff into the left femoral artery to better assess the  vasculature in case higher levels of cardiac support was needed. At  that point, I went ahead and took out the 6-Panamanian sheath. We placed  two  Perclose closure devices at 10 and 2 position. Those wires were  pulled to the side and held. I then dilated up the tract to 14-Panamanian  and a 14-Panamanian Impella long sheath was placed under fluoroscopic  guidance. At that point, heparinization was given and ACTs were checked  periodically throughout the case. We then started cangrelor IV bolus  and drip for antiplatelet. The patient tolerated this portion of the  procedure well. We then turned our patient attention to the  interventional portion. I took an Congo guiding catheter and engaged the  left main coronary artery.   A Johnathon blue wire was then

## 2020-06-11 NOTE — PROGRESS NOTES
EF <20 % per echo 6/6, previous EF 35-40% 2007  4. CAD   - 6/10/2020 PCi to mid LAD under impella  5. MVP: on echo 2007  6. HLD: will add Lipids to am lab work   7. Noncompliance with medication and follow up   8. UTI  9. Dilated ascending aorta: 4.32 cm  10. Alcohol abuse: he stated usually 1 Palau[place] whisky drink a day, cessation counseled   11. VT: 30sec      Plan:   1. Lasix 40mg IV x 1 for pulm edema  2. Remove Impella today  3. Cont ASA 81mg qday and Ticagrelor 90mg po BID for 1 yr min   - Cont Cozaar   - Start coreg today  4. Aldactone in future  5. Avoid all EToH  6. Interval echo in 3 months for AsAo and LVEF        Cardiology Consult ([id number]) Total critical care time was 40 minutes, excluding separately reportable procedures. Services, included in critical care time were chart data review, documentation time, obtaining info from patient, review of nursing notes, and vital sign assessment and management of the patient. D/w pt and wife and CT surgery    There was a high probability of clinically significant life-threatening deterioration in the patient's condition, which required my urgent intervention. Patient Active Problem List   Diagnosis    Atrial fibrillation with rapid ventricular response (HCC)    Dyspnea    Acute congestive heart failure (HCC)    Dilated cardiomyopathy (HCC)    Coronary artery disease    Dilatation of aorta ([facility]    ETOH abuse    Ischemic cardiomyopathy    Ventricular tachyarrhythmia ([facility]         Thank you for allowing me to participate in the care of your patient. Please call me with any questions [phone].       Keith Smalls MD, [facility] [facility] Cardiologist  [facility]  [phone] [facility]  [phone] [address] [place] [address]  6/11/2020 3:07 PM

## 2020-06-11 NOTE — PROGRESS NOTES
Suction alarm sounding. Turned performance level down. Impella placement correct. Patient tolerating P-4 at the moment. Turned IV fluids on. NS running at 100ml/hr.

## 2020-06-11 NOTE — PROGRESS NOTES
Pulmonary & Critical Care Medicine ICU Progress Note    51-year-old male with mild obesity, cardiomyopathy, atrial fibrillation, admitted to ICU after complex PCI of the LAD, pulmonary edema. Has E. coli UTI. On Impella for cardiac support. Events of Last 24 hours: Was kept intubated and sedated as per cardiology, until cardiac stability was proven. Has remained sedated and intubated. No respiratory distress. Afebrile. Blood pressure low normal.  Was given fluids overnight as suction alarm was sounding. Impella catheter was pulled back this morning. Invasive Lines:   Art Line radial 6/10/20     Impella right groin  Venous sheath    MV:  6/10/20    Recent Labs     06/10/20  1846 06/11/20  0542   PHART 7.396 7.411   XJA3LMI 29.4* 37.3   PO2ART 82.8 69.3*       MV Settings:  Vent Mode: AC/VC Rate Set: 16 bmp/Vt Ordered: 600 mL/ [email or web address])    IV:   heparin (porcine)      cangrelor [facility]) infusion Stopped (06/10/20 1859)    eptifibatide Stopped (06/10/20 1859)    fentaNYL (SUBLIMAZE) infusion 50 mcg/hr (06/10/20 1921)    heparin 25,000 units in dextrose 5 % 25,000 Units (06/10/20 1920)    propofol 25 mcg/kg/min (06/11/20 0420)       Vitals:  BP (!) 92/55   Pulse 59   Temp 97.7 °F (36.5 °C) (Bladder)   Resp 18   Ht 6' 4\" (1.93 m)   Wt 255 lb 11.7 oz (116 kg)   SpO2 95%   BMI 31.13 kg/m²        Intake/Output Summary (Last 24 hours) at 6/11/2020 0657  Last data filed at 6/11/2020 0557  Gross per 24 hour   Intake 1856 ml   Output 1510 ml   Net 346 ml       EXAM:  General: Tall, well-built, well nourished, well developed. Overweight. Intubated and sedated  Eyes:  No scleral icterus or periorbital edema. PERRL, pupils 3 mm  Head: Atraumatic, normocephalic. ENMT: # 8 ETT, 24 cm at incisor level, OG intact. No bleeding of lips or gums. Mucosa dry. No ulceration. No oral candidiasis. Neck: No palpable goiter, no lymphadenopathy, no JVD. No tracheal deviation. No S/Q emphysema. PCI.  Discussed with Dr. Francisco Hill, expects perfusion to improve. ?  Cardiogenic shock, dilated cardiomyopathy, pulmonary edema. Dependent on Impella, will be kept intubated overnight. Wean off Impella as tolerated, will have to keep intubated until edema decreases. Atrial fibrillation with rapid ventricular response. Presently controlled. Acute systolic CHF, dilated cardiomyopathy, shortness of breath. Per cardiology. E. coli UTI. Pansensitive, on cefdinir  Dilatation of aorta. Follow. Per cardiology. EtOH abuse. Details unavailable. May need to watch for withdrawal.  DVT prophylaxis. Presently off heparin, to be determined by cardiology  PUD prophylaxis. PPI      Critical care time spent reviewing labs/films, examining patient, collaborating with other physicians but excluding procedures for life threatening organ failure is 31 minutes. Multidisciplinary rounds were completed at the bedside with participation from the intensivist, pharmacy, nursing, nutrition. All labs and imaging studies reviewed, discussed with Dr. Francisco Hill.         Electronically signed by:  Julissa Toro MD    6/11/2020    6:57 AM.

## 2020-06-11 NOTE — PROGRESS NOTES
Per Dr. Yara Murphy, RN stopped Heparin gtt and obtained an ACT of 155 seconds and made Dr. Yara Murphy aware. Plan to remove Impella today at bedside by Dr. Yara Murphy and cath lab team. Impella settings are the same. Performance level is 4. Will continue to monitor.

## 2020-06-11 NOTE — PROGRESS NOTES
06/11/20 1638   Vent Information   Vent Type 840   Vent Mode AC/VC   Vt Ordered 550 mL   Rate Set 16 bmp   Peak Flow 50 L/min   Pressure Support 0 cmH20   FiO2  40 %   SpO2 94 %   SpO2/FiO2 ratio 235   Sensitivity 3   PEEP/CPAP 5   Humidification Source HME   Vent Patient Data   Peak Inspiratory Pressure 14 cmH2O   Mean Airway Pressure 8.4 cmH20   Rate Measured 16 br/min   Vt Exhaled 558 mL   Minute Volume 9.17 Liters   I:E Ratio 1:2.00   Plateau Pressure 13 [id number]   Cough/Sputum   Sputum How Obtained Endotracheal;Suctioned   Cough Non-productive   Sputum Amount None   Spontaneous Breathing Trial (SBT) RT Doc   Pulse 81   Breath Sounds   Right Upper Lobe Clear   Right Middle Lobe Diminished   Right Lower Lobe Diminished   Left Upper Lobe Clear   Left Lower Lobe Diminished   Additional Respiratory  Assessments   Resp 19   Position Semi-Ayon's   Alarm Settings   High Pressure Alarm 40 cmH2O   Low Minute Volume Alarm 2 L/min   High Respiratory Rate 40 br/min   Low Exhaled Vt  200 mL   Patient Observation   Observations 8 ETT, ambu bag @ bedside   ETT (adult)   Placement Date/Time: 06/10/20 1451   Preoxygenation: Yes  Mask Ventilation: Ventilated by mask with oral airway (2)  Technique: Direct laryngoscopy  Type: Cuffed  Tube Size: 8 mm  Laryngoscope: Mac  Blade Size: 4  Location: Oral  Grade View: Full view. ..    Secured at 24 cm   Measured From Lips   ET Placement Left   Secured By Commercial tube rae

## 2020-06-11 NOTE — PROGRESS NOTES
06/10/20 2009   Vent Information   Vent Type 840   Vent Mode AC/VC   Vt Ordered 600 mL   Rate Set 16 bmp   Peak Flow 44 L/min   Pressure Support 0 cmH20   FiO2  50 %   SpO2 94 %   SpO2/FiO2 ratio 188   Sensitivity 3   PEEP/CPAP 5   Humidification Source HME   Vent Patient Data   Peak Inspiratory Pressure 21 cmH2O   Mean Airway Pressure 11 cmH20   Rate Measured 17 br/min   Vt Exhaled 42 mL   Minute Volume 7.33 Liters   I:E Ratio 1.30:1   Plateau Pressure 15 HBV53   Static Compliance 61.2 mL/cmH2O   Dynamic Compliance 51 mL/cmH2O   Cough/Sputum   Sputum How Obtained Endotracheal   Cough Productive   Sputum Amount Small   Sputum Color Clear   Tenacity Thin   Spontaneous Breathing Trial (SBT) RT Doc   Pulse 51   Breath Sounds   Right Upper Lobe Diminished   Right Middle Lobe Diminished   Right Lower Lobe Diminished   Left Upper Lobe Diminished   Left Lower Lobe Diminished   Additional Respiratory  Assessments   Resp 20   Alarm Settings   High Pressure Alarm 40 cmH2O   Low Minute Volume Alarm 2 L/min   High Respiratory Rate 40 br/min   Low Exhaled Vt  200 mL   Patient Observation   Observations ambu bag at bedside   ETT (adult)   Placement Date/Time: 06/10/20 1451   Preoxygenation: Yes  Mask Ventilation: Ventilated by mask with oral airway (2)  Technique: Direct laryngoscopy  Type: Cuffed  Tube Size: 8 mm  Laryngoscope: Mac  Blade Size: 4  Location: Oral  Grade View: Full view. ..    Secured at 24 cm   Measured From Lips   ET Placement Right   Secured By Commercial tube rae   Site Condition Dry   Cuff Pressure 38 cm H2O

## 2020-06-11 NOTE — PROGRESS NOTES
62   Resp: 20 22 29 29   Temp:  98.2 °F (36.8 °C)     TempSrc:  Bladder     SpO2: 95% 98% 96% 96%   Weight:       Height:             Intake/Output Summary (Last 24 hours) at 6/11/2020 0904  Last data filed at 6/11/2020 0859  Gross per 24 hour   Intake 1866 ml   Output 1510 ml   Net 356 ml     Wt Readings from Last 3 Encounters:   06/11/20 255 lb 11.7 oz (116 kg)         Gen: Patient in NAD, resting comfortably on vent  Neck: No JVD or bruits  Respiratory: crackles no WRR  Chest: normal without deformity  Cardiovascular:irregular RR, S1S2, no mrg, normal PMI  Abdomen: Soft, NTND, Normal BS  Extremities: No clubbing, cyanosis, or edema, moderate ecchymosis but no hematoma  Neurological/Psychiatric: sedated  Skin:  Warm and dry      Labs:  CBC:   Recent Labs     06/08/20  1601 06/09/20  0339 06/10/20  0526 06/11/20  0542   WBC 6.4 7.1  --  5.5   HGB 14.9 14.7  --  12.0*   HCT 43.8 43.0  --  35.4*   MCV 98.7 96.5  --  97.4    171 147 130*     BMP:   Recent Labs     06/09/20  0339 06/11/20  0542   * 137   K 4.2  4.2 4.1    108   CO2 24 22   BUN 21* 17   CREATININE 1.2 0.9     MG:    Recent Labs     06/09/20  0339   MG 2.30      PT/INR:   Recent Labs     06/08/20  1016   PROTIME 12.5   INR 1.08     APTT:   Recent Labs     06/10/20  2145 06/11/20  0025 06/11/20  0542   APTT 60.0* 59.8* 47.7*     Cardiac Enzymes: No results for input(s): CKTOTAL, CKMB, CKMBINDEX, TROPONINI in the last 72 hours. Cardiac Studies:          Assessment and Plan   Assessment:  1. Atrial fibrillation with RVR:               - [id number] Score for Atrial Fibrillation Stroke Risk 2              - s/p failed mini-maze   2.  Acute on chronic systolic congestive heart failure: ongoing- improved               - weights have been stated weight on 6/4 230- actual standing scale 6/6 -244-->237-->242 today              - net negative -3L overall               - continue daily weights, low sodium diet, 2000 ml fluid restriction, and

## 2020-06-11 NOTE — PLAN OF CARE
Problem: Nutrition  Goal: Optimal nutrition therapy  Outcome: Ongoing   Nutrition Problem: Inadequate oral intake  Intervention: Food and/or Nutrient Delivery: (diet advancement post extubation vs initiate TF )  Nutritional Goals:  Tolerate most appropriate form of nutrition therapy this admission

## 2020-06-11 NOTE — CARE COORDINATION
Chart review completed. Patient a 71year old male, admitted for atrial fib with RVR. Hospital day 7, currently in ICU level of care on vent following cath with stent placement and Impella. Initial assessment shows pt normally independent prior to admission. Plan has been to return home with ex-wife without needs. CM anticipates due to course of treatment there to be more needs. Will follow up with family once pt stabilizes and needs are clear.  Michelle Massey RN

## 2020-06-11 NOTE — PROGRESS NOTES
5. 5   HGB 14.9 14.7  --  12.0*   HCT 43.8 43.0  --  35.4*    171 147 130*     Recent Labs     06/09/20  0339 06/11/20  0542   * 137   K 4.2  4.2 4.1    108   CO2 24 22   BUN 21* 17   CREATININE 1.2 0.9   CALCIUM 9.1 7.8*     No results for input(s): AST, ALT, BILIDIR, BILITOT, ALKPHOS in the last 72 hours. Recent Labs     06/08/20  1016   INR 1.08     No results for input(s): Rebeca Bancroft in the last 72 hours. Urinalysis:      Lab Results   Component Value Date    NITRU POSITIVE 06/04/2020    WBCUA 3-5 06/04/2020    BACTERIA 3+ 06/04/2020    RBCUA 0-2 06/04/2020    BLOODU Negative 06/04/2020    SPECGRAV 1.015 06/04/2020    GLUCOSEU Negative 06/04/2020       Radiology:  XR CHEST 1 VW   Final Result   CHF with mild pulmonary edema, unchanged. XR CHEST 1 VW   Final Result   Endotracheal tube in satisfactory position. No enteric tube definitively visualized in its expected course. XR ABDOMEN (KUB) (SINGLE AP VIEW)   Preliminary Result   Status post placement of nasogastric tube with distal tip located in the   region the stomach as described above. XR CHEST PORTABLE   Final Result   No radiographic evidence of acute pulmonary disease. Assessment/Plan:    Active Hospital Problems    Diagnosis    Dilated [facility] ([facility] [I42.0]    Coronary artery disease involving native coronary artery of native heart without angina pectoris [I25.10]    Dilatation of aorta ([facility]) [I77.819]    ETOH abuse [F10.10]    Atrial fibrillation with rapid ventricular response ([facility]) [I48.91]    Dyspnea [R06.00]    Acute congestive heart failure ([facility] [I50.9]     Atrial fibrillation with RVR POA, currently rate controlled:   - S/p failed mini maze procedure. - Pt started on diltiazem drip on admission. Dc'd and changed to Toprol XL.   - Cardiology consulted/following.   - Continue therapeutic Lovenox (pt currently refusing injections).  Transition to NOAC when

## 2020-06-11 NOTE — PROGRESS NOTES
Spoke with pharmacy about APTT level. Going to recheck at 2130, 3 hours after initial, and go from there. As of right now not starting heparin infusion.

## 2020-06-11 NOTE — PROGRESS NOTES
Noticed some bleeding around goetz insertion site. Wiped down and gauze placed underneath. Will monitor.

## 2020-06-11 NOTE — PROGRESS NOTES
Nutrition Assessment    Type and Reason for Visit: Reassess(New Vent )    Nutrition Recommendations:   1. Continue NPO  2. Monitor ability to extubate, vs need for TF initiation if prolonged NPO anticipated   3. Monitor nutrition adequacy, pertinent labs, bowel habits, wt changes, and clinical progress    Nutrition Assessment: Pt declining nutritionally AEB NPO status. Pt is s/p PCI and Impella placement. He is intubated currently. Pt seen in room, RN at bedside, reports plans to have Impella removed per Cards. Pt will need to lay flat for prolonged  amount of time post removal. Pt had adequate nutrition status P/t intubation, consuming % of all meals during previous week. Wt trending up this admission. Will monitor ongoing nutrition needs including need for nutrition support if prolonged NPO and intubation anticipated. Malnutrition Assessment:  · Malnutrition Status: No malnutrition    Nutrition Risk Level: High    Nutrient Needs:  · Estimated Daily Total Kcal: 0268-1650 kcals   · Estimated Daily Protein (g): 138-184 g   · Estimated Daily Total Fluid (ml/day): 1 mL/kcal     Nutrition Diagnosis:   · Problem: Inadequate oral intake  · Etiology: related to Insufficient energy/nutrient consumption     Signs and symptoms:  as evidenced by NPO status due to medical condition    Objective Information:  · Nutrition-Focused Physical Findings: +2 BLE edema   · Current Nutrition Therapies:  · Oral Diet Orders: NPO   · Oral Diet intake: NPO  · Oral Nutrition Supplement (ONS) Orders: None  · ONS intake: NPO  · Anthropometric Measures:  · Ht: 6' 4\" (193 cm)   · Current Body Wt: 255 lb (115.7 kg)  · Admission Body Wt: 230 lb (104.3 kg)  · Ideal Body Wt: 202 lb (91.6 kg)   · BMI Classification: BMI 30.0 - 34.9 Obese Class I    Nutrition Interventions:   (diet advancement post extubation vs initiate TF )  Continued Inpatient Monitoring    Nutrition Evaluation:   · Evaluation: Goals set   · Goals:  Tolerate most

## 2020-06-11 NOTE — PROGRESS NOTES
06/11/20 0336   Vent Information   Vent Type 840   Vent Mode AC/VC   Vt Ordered 600 mL   Rate Set 16 bmp   Peak Flow 50 L/min   Pressure Support 0 cmH20   FiO2  50 %   SpO2 95 %   SpO2/FiO2 ratio 190   Sensitivity 3   PEEP/CPAP 5   Humidification Source HME   Vent Patient Data   Peak Inspiratory Pressure 17 cmH2O   Mean Airway Pressure 10 cmH20   Rate Measured 16 br/min   Vt Exhaled 616 mL   Minute Volume 9.78 Liters   I:E Ratio 1:1.90   Cough/Sputum   Sputum How Obtained Endotracheal;Suctioned   Cough Productive   Sputum Amount Moderate   Sputum Color Clear   Tenacity Thick   Spontaneous Breathing Trial (SBT) RT Doc   Pulse 58   Breath Sounds   Right Upper Lobe Clear   Right Middle Lobe Diminished   Right Lower Lobe Diminished   Left Upper Lobe Clear   Left Lower Lobe Diminished   Additional Respiratory  Assessments   Resp 16   Alarm Settings   High Pressure Alarm 40 cmH2O   Low Minute Volume Alarm 2 L/min   High Respiratory Rate 40 br/min   Low Exhaled Vt  200 mL   ETT (adult)   Placement Date/Time: 06/10/20 1835   Tube Size: 8 mm  Location: Oral  Secured at: 24 cm  Placed By: In surgery  Measured From: Lips   Secured at 25 cm   Measured From Lips   ET Placement Right   Secured By Commercial tube rae

## 2020-06-11 NOTE — PROGRESS NOTES
06/11/20 1157   Vent Information   Vent Type 840   Vent Mode AC/VC   Vt Ordered 550 mL   Rate Set 16 bmp   Peak Flow 50 L/min   Pressure Support 0 cmH20   FiO2  50 %   SpO2 95 %   SpO2/FiO2 ratio 190   Sensitivity 3   PEEP/CPAP 5   Humidification Source HME   Vent Patient Data   Peak Inspiratory Pressure 13 cmH2O   Mean Airway Pressure 7.1 cmH20   Rate Measured 18 br/min   Vt Exhaled 600 mL   Minute Volume 10.5 Liters   I:E Ratio 1:1.90   Cough/Sputum   Sputum How Obtained Endotracheal;Suctioned   Cough Non-productive   Sputum Amount None   Spontaneous Breathing Trial (SBT) RT Doc   Pulse 77   Breath Sounds   Right Upper Lobe Clear   Right Middle Lobe Diminished   Right Lower Lobe Diminished   Left Upper Lobe Clear   Left Lower Lobe Diminished   Additional Respiratory  Assessments   Resp 18   Position Semi-Ayon's   Alarm Settings   High Pressure Alarm 40 cmH2O   Low Minute Volume Alarm 2 L/min   High Respiratory Rate 40 br/min   Low Exhaled Vt  200 mL   Patient Observation   Observations 8 ETT, ambu bag @ bedside   ETT (adult)   Placement Date/Time: 06/10/20 1451   Preoxygenation: Yes  Mask Ventilation: Ventilated by mask with oral airway (2)  Technique: Direct laryngoscopy  Type: Cuffed  Tube Size: 8 mm  Laryngoscope: Mac  Blade Size: 4  Location: Oral  Grade View: Full view. ..    Secured at 24 cm   Measured From Lips   ET Placement Right   Secured By Commercial tube rae

## 2020-06-12 ENCOUNTER — APPOINTMENT (OUTPATIENT)
Dept: GENERAL RADIOLOGY | Age: 70
DRG: 215 | End: 2020-06-12
Payer: MEDICARE

## 2020-06-12 LAB
ANION GAP SERPL CALCULATED.3IONS-SCNC: 8 MMOL/L (ref 3–16)
BASE EXCESS ARTERIAL: -0.1 MMOL/L (ref -3–3)
BASE EXCESS ARTERIAL: 1 (ref -3–3)
BUN BLDV-MCNC: 17 MG/DL (ref 7–20)
CALCIUM SERPL-MCNC: 8.2 MG/DL (ref 8.3–10.6)
CARBOXYHEMOGLOBIN ARTERIAL: 0.9 % (ref 0–1.5)
CHLORIDE BLD-SCNC: 104 MMOL/L (ref 99–110)
CO2: 24 MMOL/L (ref 21–32)
CREAT SERPL-MCNC: 1 MG/DL (ref 0.8–1.3)
GFR AFRICAN AMERICAN: >60
GFR NON-AFRICAN AMERICAN: >60
GLUCOSE BLD-MCNC: 93 MG/DL (ref 70–99)
HCO3 ARTERIAL: 24.4 MMOL/L (ref 21–29)
HCO3 ARTERIAL: 26 MMOL/L (ref 21–29)
HCT VFR BLD CALC: 36.8 % (ref 40.5–52.5)
HEMOGLOBIN, ART, EXTENDED: 13.6 G/DL (ref 13.5–17.5)
HEMOGLOBIN: 12.5 G/DL (ref 13.5–17.5)
MCH RBC QN AUTO: 32.7 PG (ref 26–34)
MCHC RBC AUTO-ENTMCNC: 34 G/DL (ref 31–36)
MCV RBC AUTO: 96.3 FL (ref 80–100)
METHEMOGLOBIN ARTERIAL: 0.3 %
O2 CONTENT ARTERIAL: 17 ML/DL
O2 SAT, ARTERIAL: 90.5 %
O2 SAT, ARTERIAL: 99 % (ref 93–100)
O2 THERAPY: ABNORMAL
PCO2 ARTERIAL: 39.5 MMHG (ref 35–45)
PCO2 ARTERIAL: 42 MM HG (ref 35–45)
PDW BLD-RTO: 13.2 % (ref 12.4–15.4)
PERFORMED ON: ABNORMAL
PH ARTERIAL: 7.4 (ref 7.35–7.45)
PH ARTERIAL: 7.41 (ref 7.35–7.45)
PLATELET # BLD: 130 K/UL (ref 135–450)
PLATELET # BLD: 133 K/UL (ref 135–450)
PMV BLD AUTO: 7.7 FL (ref 5–10.5)
PO2 ARTERIAL: 139 MM HG (ref 75–108)
PO2 ARTERIAL: 56.8 MMHG (ref 75–108)
POC ACT LR: 155 SEC
POC SAMPLE TYPE: ABNORMAL
POTASSIUM REFLEX MAGNESIUM: 3.9 MMOL/L (ref 3.5–5.1)
RBC # BLD: 3.82 M/UL (ref 4.2–5.9)
SODIUM BLD-SCNC: 136 MMOL/L (ref 136–145)
TCO2 ARTERIAL: 25.6 MMOL/L
TCO2 ARTERIAL: 27 MMOL/L
WBC # BLD: 7 K/UL (ref 4–11)

## 2020-06-12 PROCEDURE — 99291 CRITICAL CARE FIRST HOUR: CPT | Performed by: INTERNAL MEDICINE

## 2020-06-12 PROCEDURE — 80048 BASIC METABOLIC PNL TOTAL CA: CPT

## 2020-06-12 PROCEDURE — 2580000003 HC RX 258: Performed by: INTERNAL MEDICINE

## 2020-06-12 PROCEDURE — 99233 SBSQ HOSP IP/OBS HIGH 50: CPT | Performed by: INTERNAL MEDICINE

## 2020-06-12 PROCEDURE — 2500000003 HC RX 250 WO HCPCS: Performed by: INTERNAL MEDICINE

## 2020-06-12 PROCEDURE — 85049 AUTOMATED PLATELET COUNT: CPT

## 2020-06-12 PROCEDURE — 6370000000 HC RX 637 (ALT 250 FOR IP): Performed by: INTERNAL MEDICINE

## 2020-06-12 PROCEDURE — 94761 N-INVAS EAR/PLS OXIMETRY MLT: CPT

## 2020-06-12 PROCEDURE — 2000000000 HC ICU R&B

## 2020-06-12 PROCEDURE — 71045 X-RAY EXAM CHEST 1 VIEW: CPT

## 2020-06-12 PROCEDURE — 2700000000 HC OXYGEN THERAPY PER DAY

## 2020-06-12 PROCEDURE — C9113 INJ PANTOPRAZOLE SODIUM, VIA: HCPCS | Performed by: INTERNAL MEDICINE

## 2020-06-12 PROCEDURE — 6360000002 HC RX W HCPCS: Performed by: INTERNAL MEDICINE

## 2020-06-12 PROCEDURE — 82803 BLOOD GASES ANY COMBINATION: CPT

## 2020-06-12 PROCEDURE — 37799 UNLISTED PX VASCULAR SURGERY: CPT

## 2020-06-12 PROCEDURE — 94003 VENT MGMT INPAT SUBQ DAY: CPT

## 2020-06-12 PROCEDURE — 94770 HC ETCO2 MONITOR DAILY: CPT

## 2020-06-12 PROCEDURE — 85027 COMPLETE CBC AUTOMATED: CPT

## 2020-06-12 PROCEDURE — 94750 HC PULMONARY COMPLIANCE STUDY: CPT

## 2020-06-12 RX ORDER — FOLIC ACID 1 MG/1
1 TABLET ORAL DAILY
Status: COMPLETED | OUTPATIENT
Start: 2020-06-12 | End: 2020-06-14

## 2020-06-12 RX ORDER — THIAMINE MONONITRATE (VIT B1) 100 MG
100 TABLET ORAL DAILY
Status: COMPLETED | OUTPATIENT
Start: 2020-06-12 | End: 2020-06-14

## 2020-06-12 RX ORDER — FUROSEMIDE 10 MG/ML
20 INJECTION INTRAMUSCULAR; INTRAVENOUS ONCE
Status: COMPLETED | OUTPATIENT
Start: 2020-06-12 | End: 2020-06-12

## 2020-06-12 RX ADMIN — ROSUVASTATIN CALCIUM 10 MG: 10 TABLET, FILM COATED ORAL at 20:48

## 2020-06-12 RX ADMIN — Medication 10 ML: at 10:12

## 2020-06-12 RX ADMIN — SACUBITRIL AND VALSARTAN 1 TABLET: 24; 26 TABLET, FILM COATED ORAL at 10:12

## 2020-06-12 RX ADMIN — CARBOXYMETHYLCELLULOSE SODIUM 1 DROP: 10 GEL OPHTHALMIC at 04:11

## 2020-06-12 RX ADMIN — GABAPENTIN 100 MG: 100 CAPSULE ORAL at 20:48

## 2020-06-12 RX ADMIN — CHLORHEXIDINE GLUCONATE 15 ML: 1.2 RINSE ORAL at 10:13

## 2020-06-12 RX ADMIN — GABAPENTIN 100 MG: 100 CAPSULE ORAL at 15:57

## 2020-06-12 RX ADMIN — Medication 10 ML: at 20:48

## 2020-06-12 RX ADMIN — MUPIROCIN: 20 OINTMENT TOPICAL at 20:48

## 2020-06-12 RX ADMIN — SODIUM CHLORIDE 0.2 MCG/KG/HR: 900 INJECTION INTRAVENOUS at 09:30

## 2020-06-12 RX ADMIN — MUPIROCIN: 20 OINTMENT TOPICAL at 10:13

## 2020-06-12 RX ADMIN — GABAPENTIN 100 MG: 100 CAPSULE ORAL at 10:12

## 2020-06-12 RX ADMIN — FUROSEMIDE 20 MG: 10 INJECTION, SOLUTION INTRAVENOUS at 11:41

## 2020-06-12 RX ADMIN — PROPOFOL 30 MCG/KG/MIN: 10 INJECTION, EMULSION INTRAVENOUS at 05:00

## 2020-06-12 RX ADMIN — ASPIRIN 81 MG 81 MG: 81 TABLET ORAL at 10:12

## 2020-06-12 RX ADMIN — CEFDINIR 300 MG: 300 CAPSULE ORAL at 10:14

## 2020-06-12 RX ADMIN — SACUBITRIL AND VALSARTAN 1 TABLET: 24; 26 TABLET, FILM COATED ORAL at 20:48

## 2020-06-12 RX ADMIN — CARBOXYMETHYLCELLULOSE SODIUM 1 DROP: 10 GEL OPHTHALMIC at 10:12

## 2020-06-12 RX ADMIN — TICAGRELOR 90 MG: 90 TABLET ORAL at 20:48

## 2020-06-12 RX ADMIN — Medication 100 MG: at 10:11

## 2020-06-12 RX ADMIN — FOLIC ACID 1 MG: 1 TABLET ORAL at 10:12

## 2020-06-12 RX ADMIN — PANTOPRAZOLE SODIUM 40 MG: 40 INJECTION, POWDER, FOR SOLUTION INTRAVENOUS at 10:11

## 2020-06-12 RX ADMIN — TICAGRELOR 90 MG: 90 TABLET ORAL at 10:12

## 2020-06-12 RX ADMIN — CARBOXYMETHYLCELLULOSE SODIUM 1 DROP: 10 GEL OPHTHALMIC at 00:10

## 2020-06-12 RX ADMIN — MELATONIN TAB 5 MG 5 MG: 5 TAB at 20:48

## 2020-06-12 RX ADMIN — CARVEDILOL 3.12 MG: 3.12 TABLET, FILM COATED ORAL at 10:12

## 2020-06-12 RX ADMIN — CARVEDILOL 3.12 MG: 3.12 TABLET, FILM COATED ORAL at 18:32

## 2020-06-12 RX ADMIN — CEFDINIR 300 MG: 300 CAPSULE ORAL at 20:48

## 2020-06-12 RX ADMIN — CHLORHEXIDINE GLUCONATE 15 ML: 1.2 RINSE ORAL at 20:48

## 2020-06-12 RX ADMIN — FENTANYL CITRATE 50 MCG/HR: 50 INJECTION, SOLUTION INTRAMUSCULAR; INTRAVENOUS at 06:34

## 2020-06-12 ASSESSMENT — PULMONARY FUNCTION TESTS
PIF_VALUE: 19
PIF_VALUE: 13
PIF_VALUE: 40
PIF_VALUE: 18
PIF_VALUE: 16
PIF_VALUE: 15
PIF_VALUE: 16
PIF_VALUE: 13
PIF_VALUE: 19
PIF_VALUE: 17
PIF_VALUE: 23
PIF_VALUE: 15
PIF_VALUE: 16
PIF_VALUE: 15
PIF_VALUE: 16
PIF_VALUE: 13
PIF_VALUE: 29
PIF_VALUE: 16

## 2020-06-12 ASSESSMENT — PAIN SCALES - GENERAL
PAINLEVEL_OUTOF10: 0

## 2020-06-12 NOTE — PROGRESS NOTES
35-40% 2007  4. CAD   - 6/10/2020 PCi to mid LAD under impella  5. MVP: on echo 2007  6. HLD: will add Lipids to am lab work   7. Noncompliance with medication and follow up   8. UTI  9. Dilated ascending aorta: 4.32 cm  10. Alcohol abuse: he stated usually 1 [place] whisky drink a day, cessation counseled   11. VT: None recently     Plan:   1. Extubation trial   - Critical to control agitation/HTN or risk flash pulm edema   - Precidex ggt and NTG ggt  prn as needed  2. Hold lasix today  3. Cont ASA 81mg qday and Ticagrelor 90mg po BID for 1 yr min   - Cont coreg, crestor   - change cozaar to entresto  4. Aldactone in future  5. Avoid all EToH  6. Interval echo in 3 months for AsAo and LVEF  7. Suggest Lifevest as outpt      Cardiology Consult ([id number]) Total critical care time was 30 minutes, excluding separately reportable procedures. Services, included in critical care time were chart data review, documentation time, obtaining info from patient's RN, review of nursing notes, and vital sign assessment and management of the patient. D/w wife    There was a high probability of clinically significant life-threatening deterioration in the patient's condition, which required my urgent intervention. Patient Active Problem List   Diagnosis    Atrial fibrillation with rapid ventricular response (HCC)    Dyspnea    Acute congestive heart failure (HCC)    Dilated cardiomyopathy (HCC)    Coronary artery disease    Dilatation of aorta ([facility]    ETOH abuse    Ischemic cardiomyopathy    Ventricular tachyarrhythmia ([facility]         Thank you for allowing me to participate in the care of your patient. Please call me with any questions [phone].       Armida Peoples MD, [address] [facility] Cardiologist  [facility]  [phone] [facility]  [phone] [address] [place] [address]  6/12/2020 9:16 AM

## 2020-06-12 NOTE — PROGRESS NOTES
Patient met criteria per open heart protocol to transition to stepdown level of care. Procedures explained to patient. [place] Citrin discontinued and obturator inserted. Patient tolerated well and no ectopy on monitor. Arterial line left in place.

## 2020-06-12 NOTE — PROGRESS NOTES
RN removed right radial arterial line per protocol. Site is clean, dry, and intact with no s/s of hematoma. Tegaderm applied.

## 2020-06-12 NOTE — PROGRESS NOTES
Report recieved from [facility], RN at shift change. Shift assessment completed, medications administered, and patient's VSS. Plan of care discussed with treatment team. RN stopped Propofol gtt and started a Precedex gtt per MD orders for SBT today. Patient is currently awake and following commands and remains calm/cooperative. Per Dr. Modesta Pate, ordered a one time dose of Furosemide 20 mg IV to be given before extubation for pulmonary edema. Will continue to monitor patient.

## 2020-06-12 NOTE — PROGRESS NOTES
Dr. Heraclio Alatorre came to bedside to assess patient and stated okay to extubate patient onto nasal cannula. RN extubated patient @ 1235 to 4 L nasal cannula. Patient tolerated well and his SPO2 is 96%. RN updated RT about extubation. Will continue to monitor.

## 2020-06-12 NOTE — PROGRESS NOTES
Trying to titrate sedation down for possible SBT in the AM. Patient woke up and was violently trying to get out of bed. HR increased, O2 went down. Bilateral wrist restraints in place and propofol increased to previous dose.

## 2020-06-12 NOTE — PLAN OF CARE
Problem: OXYGENATION/RESPIRATORY FUNCTION  Goal: Patient will maintain patent airway  Outcome: Ongoing  Problem: FLUID AND ELECTROLYTE IMBALANCE  Goal: Fluid and electrolyte balance are achieved/maintained  Outcome: Ongoing    Patient's EF (Ejection Fraction) is less than 40%    Heart Failure Medications:   Diuretics[de-identified] Furosemide     (One of the following REQUIRED for EF <40%/SYSTOLIC FAILURE but MAY be used in EF% >40%/DIASTOLIC FAILURE)        ACE[de-identified] None        ARB[de-identified] Valsartan         ARNI[de-identified] Sacubitril/Valsartan-Entresto    (Beta Blockers)   NON- Evidenced Based Beta Blocker (for EF% >40%/DIASTOLIC FAILURE): Atenolol- Tenormin, Propranolol- Inderal, Esmolol-Brevibloc, Sotalol-Betapace, Labetalol- Trandate, Timolol-Blocadren, Other, and None     Evidenced Based Beta Blocker::(REQUIRED for EF% <40%/SYSTOLIC FAILURE)   Coreg  . .................................................................................................................................................. Patient's Last Weight: 248 lbs obtained by bed scale. Difference in weight is 7 pounds less than last documented weight. Intake/Output Summary (Last 24 hours) at 6/12/2020 1057  Last data filed at 6/12/2020 1019  Gross per 24 hour   Intake 710 ml   Output 2590 ml   Net -1880 ml       Comorbidities Reviewed Yes  Patient has a past medical history of Atrial fibrillation ([address] and Peripheral neuropathy.     >> For CHF and Comorbidity Education Time and Topics, please see Education Tab. Progressive Mobility Assessment:  What is this patient's Current Level of Mobility?: Ambulatory- with Assistance  How was this patient Mobilized today?: Up to chair                 With Whom? Nurse                 Level of Difficulty/Assistance: 1x Assist     Pt is currently on 4 L O2. Pt with pitting lower extremity edema.  Patient's weights and intake/output reviewed:      Patient and/or Family's stated Goal of Care this Admission: reduce shortness of

## 2020-06-12 NOTE — PROGRESS NOTES
Pulmonary & Critical Care Medicine ICU Progress Note    68-year-old male with mild obesity, cardiomyopathy, atrial fibrillation, admitted to ICU after complex PCI of the LAD, pulmonary edema. Has E. coli UTI. On Impella for cardiac support. Events of Last 24 hours: Was kept intubated and sedated as per cardiology, until cardiac stability was proven. Impella was discontinued yesterday, tolerated well. Did desaturate on SBT this morning, also got agitated. Remains in atrial fibrillation with RBBB. T-max 99.4 °F. No respiratory distress. Afebrile. Blood pressure low normal.      Invasive Lines:   Art Line radial 6/10/20     Impella right groin, removed 6/11/2020  Venous sheath    MV:  6/10/20    Recent Labs     06/11/20  0542 06/12/20  0423   PHART 7.411 7.409   TZU0QOP 37.3 39.5   PO2ART 69.3* 56.8*       MV Settings:  Vent Mode: AC/VC Rate Set: 16 bmp/Vt Ordered: 550 mL/ [email or web address])    IV:   dexmedetomidine (PRECEDEX) IV infusion      fentaNYL (SUBLIMAZE) infusion 50 mcg/hr (06/12/20 0634)    propofol 30 mcg/kg/min (06/12/20 0500)       Vitals:  BP (!) 104/55   Pulse 82   Temp 99.5 °F (37.5 °C) (Bladder)   Resp 16   Ht 6' 4\" (1.93 m)   Wt 248 lb 3.8 oz (112.6 kg)   SpO2 94%   BMI 30.22 kg/m²        Intake/Output Summary (Last 24 hours) at 6/12/2020 0900  Last data filed at 6/12/2020 0602  Gross per 24 hour   Intake 710 ml   Output 2615 ml   Net -1905 ml       EXAM:  General: Tall, well-built, well nourished, well developed. Overweight. Intubated and sedated  Eyes:  No scleral icterus or periorbital edema. PERRL, pupils 3 mm  Head: Atraumatic, normocephalic. ENMT: # 8 ETT, 24 cm at incisor level, OG intact. No bleeding of lips or gums. Mucosa dry. No ulceration. No oral candidiasis. Neck: No JVD. No tracheal deviation. No S/Q emphysema. Relatively short neck. Lymphadenopathy:  Deferred. CV:  Atrial fibrillation. Distant heart sounds S1, S2, no murmurs, gallops, clicks or rubs. Pulses palpable and symmetrical   Respiratory: Clear to auscultation  Chest: Equal rise and fall of chest.   GI: Abdomen soft. No organomegaly. : Deferred. Skin: No rashes, lesions, bruises, induration, discharge. Color, texture, turgor normal.  Musculoskeletal: Supple, no contractures or muscle atrophy. No clubbing or cyanosis of nailbeds. No joint swelling, redness. Edema: None  Neuro: Detailed exam not performed, presently sedated and not moving his extremities. Medications:  Scheduled Meds:   aspirin  81 mg Oral Daily    mupirocin   Nasal BID    chlorhexidine  15 mL Mouth/Throat BID    carboxymethylcellulose PF  1 drop Both Eyes 6 times per day    ticagrelor  90 mg Oral BID    carvedilol  3.125 mg Oral BID WC    pantoprazole  40 mg Intravenous Daily    rosuvastatin  10 mg Oral Nightly    cefdinir  300 mg Oral 2 times per day    sodium chloride flush  10 mL Intravenous 2 times per day    losartan  25 mg Oral Daily    gabapentin  100 mg Oral TID    [Held by provider] tamsulosin  0.4 mg Oral Daily       PRN Meds:  acetaminophen, sodium chloride flush, perflutren lipid microspheres, melatonin    Results:  CBC:   Recent Labs     06/10/20  0526 06/11/20  0542 06/12/20  0423   WBC  --  5.5  --    HGB  --  12.0*  --    HCT  --  35.4*  --    MCV  --  97.4  --     130* 130*     BMP:   Recent Labs     06/11/20  0542      K 4.1      CO2 22   BUN 17   CREATININE 0.9     PT/INR:   No results for input(s): PROTIME, INR in the last 72 hours. APTT:   Recent Labs     06/11/20  0025 06/11/20  0542 06/11/20  1126   APTT 59.8* 47.7* 48.3*       Cultures:      Films:  CXR reviewed by me      Assessment and plan:  Acute respiratory failure. Adjust ventilator settings based on ABG. CXR with possible pulmonary edema, will attempt to diurese and get off the ventilator Precedex to prevent agitation  CAD status post PCI. Discussed with Dr. Hosea White, expects perfusion to improve. ?

## 2020-06-12 NOTE — PROGRESS NOTES
Both Eyes 6 times per day    ticagrelor  90 mg Oral BID    carvedilol  3.125 mg Oral BID     pantoprazole  40 mg Intravenous Daily    rosuvastatin  10 mg Oral Nightly    cefdinir  300 mg Oral 2 times per day    sodium chloride flush  10 mL Intravenous 2 times per day    losartan  25 mg Oral Daily    gabapentin  100 mg Oral TID    [Held by provider] tamsulosin  0.4 mg Oral Daily     PRN Meds: eptifibatide, acetaminophen, sodium chloride flush, perflutren lipid microspheres, melatonin      Intake/Output Summary (Last 24 hours) at 6/12/2020 0851  Last data filed at 6/12/2020 0602  Gross per 24 hour   Intake 720 ml   Output 2615 ml   Net -1895 ml       Physical Exam Performed:    BP (!) 104/55   Pulse 82   Temp 99.5 °F (37.5 °C) (Bladder)   Resp 16   Ht 6' 4\" (1.93 m)   Wt 248 lb 3.8 oz (112.6 kg)   SpO2 94%   BMI 30.22 kg/m²      General appearance: No apparent distress, appears stated age and cooperative. Intubated/Sedated  HEENT: Pupils equal, round, and reactive to light. Conjunctivae/corneas clear. Neck: Supple, with full range of motion. No jugular venous distention. Trachea midline. Respiratory:  Normal respiratory effort. Clear to auscultation, bilaterally without Rales/Wheezes/Rhonchi. Cardiovascular: Irregular rate and rhythm with normal S1/S2 without murmurs, rubs or gallops. Abdomen: Soft, non-tender, non-distended with normal bowel sounds. Musculoskeletal: No clubbing, cyanosis. +BLE pitting edema -- improved   Skin: Skin color, texture, turgor normal.  No rashes or lesions. Neurologic:  Neurovascularly intact without any focal sensory/motor deficits.  Cranial nerves: II-XII intact, grossly non-focal.  Psychiatric: Alert and oriented x0, intubatdd  Capillary Refill: Brisk,< 3 seconds   Peripheral Pulses: +2 palpable, equal bilaterally       Labs:   Recent Labs     06/10/20  0526 06/11/20  0542 06/12/20  0423   WBC  --  5.5  --    HGB  --  12.0*  --    HCT  --  35.4*  --     130* 130*     Recent Labs     06/11/20  0542      K 4.1      CO2 22   BUN 17   CREATININE 0.9   CALCIUM 7.8*     No results for input(s): AST, ALT, BILIDIR, BILITOT, ALKPHOS in the last 72 hours. No results for input(s): INR in the last 72 hours. No results for input(s): Nithya Dearth in the last 72 hours. Urinalysis:      Lab Results   Component Value Date    NITRU POSITIVE 06/04/2020    WBCUA 3-5 06/04/2020    BACTERIA 3+ 06/04/2020    RBCUA 0-2 06/04/2020    BLOODU Negative 06/04/2020    SPECGRAV 1.015 06/04/2020    GLUCOSEU Negative 06/04/2020       Radiology:  XR CHEST 1 VW   Final Result   CHF with mild pulmonary edema, unchanged. XR CHEST 1 VW   Final Result   Endotracheal tube in satisfactory position. No enteric tube definitively visualized in its expected course. XR ABDOMEN (KUB) (SINGLE AP VIEW)   Final Result   Status post placement of nasogastric tube with distal tip located in the   region the stomach as described above. XR CHEST PORTABLE   Final Result   No radiographic evidence of acute pulmonary disease. Assessment/Plan:    Active Hospital Problems    Diagnosis    Ischemic cardiomyopathy [I25.5]    Ventricular tachyarrhythmia ([address] [I47.2]    Dilated cardiomyopathy ([address] [I42.0]    Coronary artery disease [I25.10]    Dilatation of aorta (HCC) [I77.819]    ETOH abuse [F10.10]    Atrial fibrillation with rapid ventricular response (HCC) [I48.91]    Dyspnea [R06.00]    Acute congestive heart failure ([address] [I50.9]     Atrial fibrillation with RVR POA, currently rate controlled:   - S/p failed mini maze procedure. - Pt started on diltiazem drip on admission. Dc'd and changed to Toprol XL.   - Cardiology consulted/following.   - Continue therapeutic Lovenox (pt currently refusing injections). Transition to NOAC when okay from Cards perspective. Pt had previously been on coumadin however he decided to stop taking it.  Also on review of his prior meds it appears that he was on Eliquis at one point.   -monitored on telemetry.   - TSH mildly elevated. Free T4 is normal.      Acute on chronic systolic CHF:   - Last ECHO in 2007 showed LVEF of 35-40%. - Updated ECHO on 6/6/20 shows LVEF < 20%. - Pt started on IV lasix 40 mg BID -- continue. - Monitor daily weights, close I's and O's and daily BMP. - Continue aspirin, BB and ARB. -s/p LHC/RHC on 6/10 with impella device  -needs cardiac MRI at some point.    -required intubation for LHC procedure     CAD: L/R cath on 6/8 found to have extensive disease   - s/p PCI, with CT surgery for backup, on 6/10, PCI to mid LAD  - was on Hep gtt      Urinary urgency:   - Abnormal UA. Urine culture grew E. Coli. Pt started on empiric IV rocephin.  Changed to PO omnicef on 6/6.  - Pt started on flomax -- continue.   - Post-void residual was unremarkable.      Peripheral neuropathy:   - Continue gabapentin 100 mg po TID.  - B12 and folate were normal.      EToh use- needs to avoid at all costs     **COVID19 test was negative on 6/4**        DVT Prophylaxis:  None currently  Diet: Diet NPO Effective Now Exceptions are: Sips with Meds  Code Status: Full Code    PT/OT Eval Status: not ordered    Dispo - icu care, hopefully extubation soon    Deuce Maciel MD

## 2020-06-12 NOTE — PROGRESS NOTES
06/12/20 1138   Vent Information   Vent Type 840   Vent Mode CPAP   Vt Ordered 550 mL   Peak Flow 50 L/min   Pressure Support 8 cmH20   FiO2  40 %   SpO2 100 %   SpO2/FiO2 ratio 250   Sensitivity 3   PEEP/CPAP 5   Humidification Source HME   Vent Patient Data   Peak Inspiratory Pressure 13 cmH2O   Mean Airway Pressure 8.3 cmH20   Rate Measured 10 br/min   Vt Exhaled 897 mL   Minute Volume 8.11 Liters   I:E Ratio 1:2.30   Cough/Sputum   Cough Non-productive   Spontaneous Breathing Trial (SBT) RT Doc   Pulse 77   Breath Sounds   Right Upper Lobe Diminished   Right Middle Lobe Diminished   Right Lower Lobe Diminished   Left Upper Lobe Diminished   Left Lower Lobe Diminished   Additional Respiratory  Assessments   Resp 19   Position Semi-Ayon's   Alarm Settings   High Pressure Alarm 40 cmH2O   Low Minute Volume Alarm 2 L/min   Apnea (secs) 20 secs   High Respiratory Rate 40 br/min   Low Exhaled Vt  200 mL   ETT (adult)   Placement Date/Time: 06/10/20 1835   Tube Size: 8 mm  Location: Oral  Secured at: 24 cm  Placed By: In surgery  Measured From: Lips   Secured at 24 cm   Measured From Lips   ET Placement Right   Secured By Commercial tube rae   Site Condition Dry

## 2020-06-12 NOTE — PROGRESS NOTES
06/12/20 0833   Vent Information   Vent Type 840   Vent Mode AC/VC   Vt Ordered 550 mL   Rate Set 16 bmp   Peak Flow 50 L/min   Pressure Support 0 cmH20   FiO2  40 %   SpO2 94 %   SpO2/FiO2 ratio 235   Sensitivity 3   PEEP/CPAP 5   Humidification Source HME   Vent Patient Data   Peak Inspiratory Pressure 16 cmH2O   Mean Airway Pressure 9.3 cmH20   Rate Measured 16 br/min   Vt Exhaled 566 mL   Minute Volume 9.01 Liters   I:E Ratio 1:2.10   Plateau Pressure 16 RAU30   Static Compliance 51 mL/cmH2O   Dynamic Compliance 51 mL/cmH2O   Cough/Sputum   Sputum How Obtained Suctioned;Endotracheal   Cough Non-productive   Spontaneous Breathing Trial (SBT) RT Doc   Pulse 82   Breath Sounds   Right Upper Lobe Diminished   Right Middle Lobe Diminished   Right Lower Lobe Diminished   Left Upper Lobe Diminished   Left Lower Lobe Diminished   Additional Respiratory  Assessments   Resp 16   Position Semi-Ayon's   Alarm Settings   High Pressure Alarm 40 cmH2O   Low Minute Volume Alarm 2 L/min   Apnea (secs) 20 secs   High Respiratory Rate 40 br/min   Low Exhaled Vt  200 mL   ETT (adult)   Placement Date/Time: 06/10/20 1451   Preoxygenation: Yes  Mask Ventilation: Ventilated by mask with oral airway (2)  Technique: Direct laryngoscopy  Type: Cuffed  Tube Size: 8 mm  Laryngoscope: Mac  Blade Size: 4  Location: Oral  Grade View: Full view. ..    Secured at 24 cm   Measured From Lips   ET Placement Right   Secured By Commercial tube rae   Site Condition Dry   Cuff Pressure 30 cm H2O

## 2020-06-13 LAB — PRO-BNP: 584 PG/ML (ref 0–124)

## 2020-06-13 PROCEDURE — 2060000000 HC ICU INTERMEDIATE R&B

## 2020-06-13 PROCEDURE — 6360000002 HC RX W HCPCS: Performed by: INTERNAL MEDICINE

## 2020-06-13 PROCEDURE — 97162 PT EVAL MOD COMPLEX 30 MIN: CPT

## 2020-06-13 PROCEDURE — 6370000000 HC RX 637 (ALT 250 FOR IP): Performed by: INTERNAL MEDICINE

## 2020-06-13 PROCEDURE — 99233 SBSQ HOSP IP/OBS HIGH 50: CPT | Performed by: INTERNAL MEDICINE

## 2020-06-13 PROCEDURE — 83880 ASSAY OF NATRIURETIC PEPTIDE: CPT

## 2020-06-13 PROCEDURE — 2700000000 HC OXYGEN THERAPY PER DAY

## 2020-06-13 PROCEDURE — 94761 N-INVAS EAR/PLS OXIMETRY MLT: CPT

## 2020-06-13 PROCEDURE — 36415 COLL VENOUS BLD VENIPUNCTURE: CPT

## 2020-06-13 PROCEDURE — C9113 INJ PANTOPRAZOLE SODIUM, VIA: HCPCS | Performed by: INTERNAL MEDICINE

## 2020-06-13 PROCEDURE — 97116 GAIT TRAINING THERAPY: CPT

## 2020-06-13 PROCEDURE — 2580000003 HC RX 258: Performed by: INTERNAL MEDICINE

## 2020-06-13 RX ORDER — FUROSEMIDE 10 MG/ML
20 INJECTION INTRAMUSCULAR; INTRAVENOUS ONCE
Status: COMPLETED | OUTPATIENT
Start: 2020-06-13 | End: 2020-06-13

## 2020-06-13 RX ADMIN — Medication 10 ML: at 21:56

## 2020-06-13 RX ADMIN — MELATONIN TAB 5 MG 5 MG: 5 TAB at 21:56

## 2020-06-13 RX ADMIN — ROSUVASTATIN CALCIUM 10 MG: 10 TABLET, FILM COATED ORAL at 21:54

## 2020-06-13 RX ADMIN — MUPIROCIN: 20 OINTMENT TOPICAL at 08:50

## 2020-06-13 RX ADMIN — SACUBITRIL AND VALSARTAN 1 TABLET: 24; 26 TABLET, FILM COATED ORAL at 10:48

## 2020-06-13 RX ADMIN — GABAPENTIN 100 MG: 100 CAPSULE ORAL at 08:40

## 2020-06-13 RX ADMIN — ASPIRIN 81 MG 81 MG: 81 TABLET ORAL at 08:39

## 2020-06-13 RX ADMIN — CARVEDILOL 3.12 MG: 3.12 TABLET, FILM COATED ORAL at 08:40

## 2020-06-13 RX ADMIN — GABAPENTIN 100 MG: 100 CAPSULE ORAL at 21:54

## 2020-06-13 RX ADMIN — Medication 100 MG: at 08:39

## 2020-06-13 RX ADMIN — GABAPENTIN 100 MG: 100 CAPSULE ORAL at 15:07

## 2020-06-13 RX ADMIN — Medication 10 ML: at 08:50

## 2020-06-13 RX ADMIN — TICAGRELOR 90 MG: 90 TABLET ORAL at 10:48

## 2020-06-13 RX ADMIN — CARVEDILOL 3.12 MG: 3.12 TABLET, FILM COATED ORAL at 17:01

## 2020-06-13 RX ADMIN — PANTOPRAZOLE SODIUM 40 MG: 40 INJECTION, POWDER, FOR SOLUTION INTRAVENOUS at 08:40

## 2020-06-13 RX ADMIN — FUROSEMIDE 20 MG: 10 INJECTION, SOLUTION INTRAVENOUS at 10:48

## 2020-06-13 RX ADMIN — FOLIC ACID 1 MG: 1 TABLET ORAL at 08:39

## 2020-06-13 RX ADMIN — TICAGRELOR 90 MG: 90 TABLET ORAL at 21:54

## 2020-06-13 RX ADMIN — SACUBITRIL AND VALSARTAN 1 TABLET: 24; 26 TABLET, FILM COATED ORAL at 21:54

## 2020-06-13 ASSESSMENT — PAIN SCALES - GENERAL
PAINLEVEL_OUTOF10: 0
PAINLEVEL_OUTOF10: 0

## 2020-06-13 NOTE — PROGRESS NOTES
Pulmonary & Critical Care Inpatient Progress Note   Eliz Westbrook MD     REASON FOR TODAY'S VISIT:  Acute resp failure    SUBJECTIVE:   Extubated yesterday, remains on supplemental oxygen  Dry cough  Net negative fluid balance of 9L      Scheduled Meds:   vitamin B-1  100 mg Oral Daily    folic acid  1 mg Oral Daily    sacubitril-valsartan  1 tablet Oral BID    aspirin  81 mg Oral Daily    mupirocin   Nasal BID    chlorhexidine  15 mL Mouth/Throat BID    carboxymethylcellulose PF  1 drop Both Eyes 6 times per day    ticagrelor  90 mg Oral BID    carvedilol  3.125 mg Oral BID WC    pantoprazole  40 mg Intravenous Daily    rosuvastatin  10 mg Oral Nightly    sodium chloride flush  10 mL Intravenous 2 times per day    gabapentin  100 mg Oral TID    [Held by provider] tamsulosin  0.4 mg Oral Daily       Continuous Infusions:   dexmedetomidine (PRECEDEX) IV infusion Stopped (06/12/20 1355)    fentaNYL (SUBLIMAZE) infusion Stopped (06/12/20 1223)    propofol Stopped (06/12/20 0930)       PRN Meds:  acetaminophen, sodium chloride flush, perflutren lipid microspheres, melatonin    ALLERGIES:  Patient has No Known Allergies. Objective:   PHYSICAL EXAM:  BP 96/64   Pulse 98   Temp 98 °F (36.7 °C) (Oral)   Resp 26   Ht 6' 4\" (1.93 m)   Wt 239 lb 8 oz (108.6 kg)   SpO2 96%   BMI 29.15 kg/m²    Physical Exam  Constitutional:       General: He is not in acute distress. Appearance: He is well-developed. He is not diaphoretic. HENT:      Head: Normocephalic and atraumatic. Mouth/Throat:      Pharynx: No oropharyngeal exudate. Eyes:      Pupils: Pupils are equal, round, and reactive to light. Neck:      Musculoskeletal: Neck supple. Vascular: No JVD. Cardiovascular:      Heart sounds: Normal heart sounds. No murmur. No friction rub. No gallop. Pulmonary:      Effort: Pulmonary effort is normal.      Breath sounds: No wheezing or rales.    Abdominal:      General: Bowel sounds are normal. There is no distension. Palpations: Abdomen is soft. Tenderness: There is no abdominal tenderness. Musculoskeletal: Normal range of motion. Lymphadenopathy:      Cervical: No cervical adenopathy. Skin:     General: Skin is warm and dry. Findings: No rash. Neurological:      Mental Status: He is alert. Cranial Nerves: No cranial nerve deficit. Comments: CN 2-12 grossly intact            Data Reviewed:   LABS:  CBC:  Recent Labs     06/11/20  0542 06/12/20  0423 06/12/20  1006   WBC 5.5  --  7.0   HGB 12.0*  --  12.5*   HCT 35.4*  --  36.8*   MCV 97.4  --  96.3   * 130* 133*     BMP:  Recent Labs     06/11/20  0542 06/12/20  1006    136   K 4.1 3.9    104   CO2 22 24   BUN 17 17   CREATININE 0.9 1.0     LIVER PROFILE: No results for input(s): AST, ALT, LIPASE, ALB, BILIDIR, BILITOT, ALKPHOS in the last 72 hours. Invalid input(s): AMYLASE  PT/INR:No results for input(s): PROTIME, INR in the last 72 hours. APTT:   Recent Labs     06/11/20  0025 06/11/20  0542 06/11/20  1126   APTT 59.8* 47.7* 48.3*     UA:No results for input(s): NITRITE, COLORU, PHUR, LABCAST, WBCUA, RBCUA, MUCUS, TRICHOMONAS, YEAST, BACTERIA, CLARITYU, SPECGRAV, LEUKOCYTESUR, UROBILINOGEN, BILIRUBINUR, BLOODU, GLUCOSEU, AMORPHOUS in the last 72 hours. Invalid input(s): Gemma Kwan  Recent Labs     06/12/20  0423 06/12/20  1218   PHART 7.409 7.400   VJV9CBT 39.5 42.0   PO2ART 56.8* 139.0*           CXR personally reviewed, bilateral atelectasis/edema           Assessment:     1. Acute resp failure, hypoxic   -acute pulm edema  2. Acute systolic CHF, cardiogenic shock, cardiomyopathy  3. UTI  4. Afib with RVR  5.  ETOH abuse without acute withdrawal    Plan:      -Diuresis, cardiology following  -Wean FIO2 as tolerated, encourage pulm expansion/mobilization out of bed as tolerated  -Atb for UTI per IM  -Monitor for acute withdrawal, DT prophylaxis  -OK to leave ICU    Please contact with questions      Stuart Naranjo MD

## 2020-06-13 NOTE — PROGRESS NOTES
Bedside report given to Shey Jordan RN. Telebox connected to patient and patient informed of transfer. Patient sent with belongings and chart to [facility].     Karyn Goodwin RN

## 2020-06-13 NOTE — PROGRESS NOTES
neuropathy. has no past surgical history on file.     Restrictions  Restrictions/Precautions  Restrictions/Precautions: Fall Risk, Up as Tolerated  Position Activity Restriction  Other position/activity restrictions: Telemetry/ICU monitoring  Vision/Hearing  Vision: Impaired  Vision Exceptions: Wears glasses at all times  Hearing: Within functional limits     Subjective  General  Chart Reviewed: Yes  Patient assessed for rehabilitation services?: Yes  Additional Pertinent Hx: CHF  Response To Previous Treatment: Not applicable  Family / Caregiver Present: No  Referring Practitioner: Goyo Lawson MD  Referral Date : 06/13/20  Diagnosis: A-fib with RVR s/p LHC, RHC and high risk PCI requiring impella d/t hypotension  Follows Commands: Within Functional Limits  General Comment  Comments: RN cleared pt for session  Subjective  Subjective: Pt seated in chair on approach, plesant and agreeable to PT evaluation  Pain Screening  Patient Currently in Pain: Denies(Pt denies pain but does report some soreness to RLE in groin site near access for sx)  Vital Signs  Patient Currently in Pain: Denies(Pt denies pain but does report some soreness to RLE in groin site near access for sx)       Orientation  Orientation  Overall Orientation Status: Within Normal Limits  Social/Functional History  Social/Functional History  Lives With: Spouse  Type of Home: House  Home Layout: Multi-level(Split level; 6-7 steps up/down with RHR)  Home Access: Stairs to enter with rails(Split level; 6-7 steps up/down with RHR)  Entrance Stairs - Rails: Right  Bathroom Shower/Tub: Walk-in shower  Bathroom Toilet: Standard  Home Equipment: Cane  ADL Assistance: Independent  Homemaking Assistance: Independent(Shares with wife, reports wife completes most of homemaking)  Ambulation Assistance: Independent  Transfer Assistance: Independent  Active : Yes  Occupation: Full time employment  Leisure & Hobbies: Working in garden and back yard  Additional Comments: Pt reports no history of falls  Cognition   Cognition  Overall Cognitive Status: WNL    Objective          AROM RLE (degrees)  RLE AROM: WFL  AROM LLE (degrees)  LLE AROM : WFL     Strength RLE  Comment: Grossly 4/5  Strength LLE  Comment: Grossly 4/5     Tone RLE  RLE Tone: Normotonic  Tone LLE  LLE Tone: Normotonic  Motor Control  Gross Motor?: WFL     Sensation  Overall Sensation Status: WFL     Bed mobility  Supine to Sit: Unable to assess  Sit to Supine: Unable to assess  Comment: Pt seated in chair at start and end of session     Transfers  Sit to Stand: Stand by assistance  Stand to sit: Stand by assistance  Comment: Without AD     Ambulation  Ambulation?: Yes  Ambulation 1  Surface: level tile  Device: No Device  Assistance: Stand by assistance  Quality of Gait: Variable armen, B decreased TKE. Pt mildly unsteady initially improving with increased distances. Gait Deviations: Increased CHIOMA  Distance: 48' + 300'  Comments: HR up to 115 with activity SpO2 95% on RA  Stairs/Curb  Stairs?: Yes  Stairs  # Steps : 4  Rails: Right ascending  Curbs: 6\"  Assistance: Stand by assistance  Comment: Pt ascends/descends with reciprocal pattern, no LOB        Balance  Posture: Good  Sitting - Static: Good  Sitting - Dynamic: Good  Standing - Static: Fair;+  Standing - Dynamic: Fair  Comments: Without AD        Plan   Plan  Times per week: 5-7x/k in ICU  Times per day: Daily  Specific instructions for Next Treatment: progress mobility as tolerated  Current Treatment Recommendations: Strengthening, Gait Training, Patient/Caregiver Education & Training, Stair training, Equipment Evaluation, Education, & procurement, Balance Training, Neuromuscular Re-education, Functional Mobility Training, Endurance Training, Home Exercise Program, Transfer Training, Safety Education & Training  Safety Devices  Type of devices:  All fall risk precautions in place, Left in chair, Call light within reach, Nurse notified, Gait belt      AM-PAC Score  AM-PAC Inpatient Mobility Raw Score : 18 (06/13/20 1711)  AM-PAC Inpatient T-Scale Score : 43.63 (06/13/20 1711)  Mobility Inpatient CMS 0-100% Score: 46.58 (06/13/20 1711)  Mobility Inpatient CMS G-Code Modifier : CK (06/13/20 1711)          Goals  Short term goals  Time Frame for Short term goals: 5 days 6/18/20 (unless otherwise specified)  Short term goal 1: 6/15/20: Pt will demonstrate supine <> sit with I  Short term goal 2: Pt will complete sit to/from stand without AD with I  Short term goal 3: PT will ambulate >150' without AD with I without LOB  Short term goal 4: Pt will ascend/descend 8 steps with RHR with MI without LOB  Short term goal 5: Pt will tolerate formal balance assessment and appropriate goal to be set to demonstrate decreased risk of falls. Patient Goals   Patient goals : \"Go back home\"       Therapy Time   Individual Concurrent Group Co-treatment   Time In 1506         Time Out 1525         Minutes 19         Timed Code Treatment Minutes: 9 Minutes(10 minutes for eval)     If pt d/c prior to next tx session, this note to serve as d/c summary.     Cielo Botello, PT, DPT

## 2020-06-13 NOTE — PROGRESS NOTES
Hand off received from day shift RN. Head to toe completed and documented in flowsheets. Pt is alert and oriented, vital signs stable. Will continue to monitor.

## 2020-06-14 LAB — PLATELET # BLD: 156 K/UL (ref 135–450)

## 2020-06-14 PROCEDURE — 6370000000 HC RX 637 (ALT 250 FOR IP): Performed by: INTERNAL MEDICINE

## 2020-06-14 PROCEDURE — 97166 OT EVAL MOD COMPLEX 45 MIN: CPT

## 2020-06-14 PROCEDURE — 6360000002 HC RX W HCPCS: Performed by: INTERNAL MEDICINE

## 2020-06-14 PROCEDURE — 36415 COLL VENOUS BLD VENIPUNCTURE: CPT

## 2020-06-14 PROCEDURE — 2580000003 HC RX 258: Performed by: INTERNAL MEDICINE

## 2020-06-14 PROCEDURE — 85049 AUTOMATED PLATELET COUNT: CPT

## 2020-06-14 PROCEDURE — C9113 INJ PANTOPRAZOLE SODIUM, VIA: HCPCS | Performed by: INTERNAL MEDICINE

## 2020-06-14 PROCEDURE — 99233 SBSQ HOSP IP/OBS HIGH 50: CPT | Performed by: INTERNAL MEDICINE

## 2020-06-14 PROCEDURE — 2060000000 HC ICU INTERMEDIATE R&B

## 2020-06-14 RX ADMIN — SACUBITRIL AND VALSARTAN 1 TABLET: 24; 26 TABLET, FILM COATED ORAL at 21:21

## 2020-06-14 RX ADMIN — TICAGRELOR 90 MG: 90 TABLET ORAL at 08:46

## 2020-06-14 RX ADMIN — PANTOPRAZOLE SODIUM 40 MG: 40 INJECTION, POWDER, FOR SOLUTION INTRAVENOUS at 08:46

## 2020-06-14 RX ADMIN — ASPIRIN 81 MG 81 MG: 81 TABLET ORAL at 08:46

## 2020-06-14 RX ADMIN — Medication 10 ML: at 21:16

## 2020-06-14 RX ADMIN — GABAPENTIN 100 MG: 100 CAPSULE ORAL at 15:27

## 2020-06-14 RX ADMIN — GABAPENTIN 100 MG: 100 CAPSULE ORAL at 21:16

## 2020-06-14 RX ADMIN — MELATONIN TAB 5 MG 5 MG: 5 TAB at 21:21

## 2020-06-14 RX ADMIN — Medication 100 MG: at 08:46

## 2020-06-14 RX ADMIN — ROSUVASTATIN CALCIUM 10 MG: 10 TABLET, FILM COATED ORAL at 21:16

## 2020-06-14 RX ADMIN — CARVEDILOL 3.12 MG: 3.12 TABLET, FILM COATED ORAL at 08:47

## 2020-06-14 RX ADMIN — CARVEDILOL 3.12 MG: 3.12 TABLET, FILM COATED ORAL at 17:35

## 2020-06-14 RX ADMIN — SACUBITRIL AND VALSARTAN 1 TABLET: 24; 26 TABLET, FILM COATED ORAL at 08:47

## 2020-06-14 RX ADMIN — Medication 10 ML: at 08:47

## 2020-06-14 RX ADMIN — FOLIC ACID 1 MG: 1 TABLET ORAL at 08:47

## 2020-06-14 RX ADMIN — TICAGRELOR 90 MG: 90 TABLET ORAL at 21:16

## 2020-06-14 RX ADMIN — GABAPENTIN 100 MG: 100 CAPSULE ORAL at 08:47

## 2020-06-14 RX ADMIN — CARBOXYMETHYLCELLULOSE SODIUM 1 DROP: 10 GEL OPHTHALMIC at 08:47

## 2020-06-14 ASSESSMENT — PAIN SCALES - GENERAL
PAINLEVEL_OUTOF10: 4
PAINLEVEL_OUTOF10: 0

## 2020-06-14 ASSESSMENT — PAIN DESCRIPTION - LOCATION: LOCATION: GROIN

## 2020-06-14 ASSESSMENT — PAIN DESCRIPTION - PAIN TYPE: TYPE: ACUTE PAIN

## 2020-06-14 NOTE — PROGRESS NOTES
carboxymethylcellulose PF  1 drop Both Eyes 6 times per day    ticagrelor  90 mg Oral BID    carvedilol  3.125 mg Oral BID WC    pantoprazole  40 mg Intravenous Daily    rosuvastatin  10 mg Oral Nightly    sodium chloride flush  10 mL Intravenous 2 times per day    gabapentin  100 mg Oral TID    [Held by provider] tamsulosin  0.4 mg Oral Daily     PRN Meds: acetaminophen, sodium chloride flush, perflutren lipid microspheres, melatonin      Intake/Output Summary (Last 24 hours) at 6/14/2020 0724  Last data filed at 6/14/2020 0555  Gross per 24 hour   Intake 1070 ml   Output 900 ml   Net 170 ml       Physical Exam Performed:    /71   Pulse 65   Temp 98.1 °F (36.7 °C) (Oral)   Resp 16   Ht 6' 4\" (1.93 m)   Wt 239 lb 8 oz (108.6 kg)   SpO2 93%   BMI 29.15 kg/m²     General appearance: No apparent distress, appears stated age and cooperative.   HEENT: Pupils equal, round, and reactive to light. Conjunctivae/corneas clear. Neck: Supple, with full range of motion. No jugular venous distention. Trachea midline. Respiratory:  Normal respiratory effort. Clear to auscultation, bilaterally without Rales/Wheezes/Rhonchi. Cardiovascular: Irregular rate and rhythm with normal S1/S2 without murmurs, rubs or gallops. Abdomen: Soft, non-tender, non-distended with normal bowel sounds. Musculoskeletal: No clubbing, cyanosis. +BLE pitting edema -- improved   Skin: Skin color, texture, turgor normal.  No rashes or lesions. Neurologic:  Neurovascularly intact without any focal sensory/motor deficits.  Cranial nerves: II-XII intact, grossly non-focal.  Psychiatric: Alert and oriented x4  Capillary Refill: Brisk,< 3 seconds   Peripheral Pulses: +2 palpable, equal bilaterally          Labs:   Recent Labs     06/12/20  0423 06/12/20  1006 06/14/20  0530   WBC  --  7.0  --    HGB  --  12.5*  --    HCT  --  36.8*  --    * 133* 156     Recent Labs     06/12/20  1006      K 3.9      CO2 24   BUN 17 CREATININE 1.0   CALCIUM 8.2*     No results for input(s): AST, ALT, BILIDIR, BILITOT, ALKPHOS in the last 72 hours. No results for input(s): INR in the last 72 hours. No results for input(s): [place] in the last 72 hours. Urinalysis:      Lab Results   Component Value Date    NITRU POSITIVE 06/04/2020    WBCUA 3-5 06/04/2020    BACTERIA 3+ 06/04/2020    RBCUA 0-2 06/04/2020    BLOODU Negative 06/04/2020    SPECGRAV 1.015 06/04/2020    GLUCOSEU Negative 06/04/2020       Radiology:  XR CHEST 1 VW   Final Result   1. No significant change. XR CHEST 1 VW   Final Result   CHF with mild pulmonary edema, unchanged. XR CHEST 1 VW   Final Result   Endotracheal tube in satisfactory position. No enteric tube definitively visualized in its expected course. XR ABDOMEN (KUB) (SINGLE AP VIEW)   Final Result   Status post placement of nasogastric tube with distal tip located in the   region the stomach as described above. XR CHEST PORTABLE   Final Result   No radiographic evidence of acute pulmonary disease. Assessment/Plan:    Active Hospital Problems    Diagnosis    Mixed hyperlipidemia [E78.2]    Ischemic cardiomyopathy [I25.5]    Ventricular tachyarrhythmia ([address] [I47.2]    Dilated cardiomyopathy ([address] [I42.0]    Coronary artery disease [I25.10]    Dilatation of aorta ([address] [I77.819]    ETOH abuse [F10.10]    Atrial fibrillation with rapid ventricular response (HCC) [I48.91]    Dyspnea [R06.00]    Acute congestive heart failure ([address] [I50.9]     Atrial fibrillation with RVR POA, currently rate controlled:   - S/p failed mini maze procedure. - Pt started on diltiazem drip on admission. Dc'd and changed to Toprol XL.   - Cardiology consulted/following.   - ordered therapeutic Lovenox (pt currently refusing injections). Transition to NOAC when okay from Cards perspective. Pt had previously been on coumadin however he decided to stop taking it.  Also on review of his prior meds it appears that he was on Eliquis at one point.   -monitored on telemetry.   - TSH mildly elevated. Free T4 is normal.      Acute on chronic systolic CHF:   - Last ECHO in 2007 showed LVEF of 35-40%. - Updated ECHO on 6/6/20 shows LVEF < 20%. -  started on IV lasix during stay  - Monitor daily weights, close I's and O's and daily BMP. - Continue aspirin, BB and ARB.    -s/p LHC/RHC on 6/10 with impella device  -needs cardiac MRI at some point.    -required intubation for LHC procedure, extubated 6/12  - EP eval pending for lifevest    CAD: L/R cath on 6/8 found to have extensive disease   - s/p PCI, with CT surgery for backup, on 6/10, PCI to mid LAD  - was on Hep gtt      Urinary urgency:   - Abnormal UA. Urine culture grew E. Coli. Pt started on empiric IV rocephin. Changed to PO omnicef on 6/6, ended 6/12.   - Pt started on flomax -- continue.   - Post-void residual was unremarkable.      Peripheral neuropathy:   - Continue gabapentin 100 mg po TID.  - B12 and folate were normal.      EToh use- needs to avoid at all costs     **COVID19 test was negative on 6/4**     DVT Prophylaxis: lovenox restarted 6/14  Diet: DIET LOW SODIUM 2 GM;  Code Status: Full Code    PT/OT Eval Status: rec  Home with prn assist, outpt pt/ot    Dispo - per cards, restarted dvt ppx if ok with cards, pending EP eval/lifevest    Juve Vargas MD

## 2020-06-14 NOTE — PLAN OF CARE
Problem: Falls - Risk of:  Goal: Will remain free from falls  Description: Will remain free from falls  Note: Pt will remain free of falls this shift. Bedside table and call light within reach, bed alarm in place. Pt instructed to call out when in need of assistance, verbalized understanding. Will continue to monitor.

## 2020-06-14 NOTE — PROGRESS NOTES
(1.93 m)   Wt 239 lb 8 oz (108.6 kg)   SpO2 96%   BMI 29.15 kg/m²    Wt Readings from Last 3 Encounters:   06/13/20 239 lb 8 oz (108.6 kg)       Intake/Output Summary (Last 24 hours) at 6/14/2020 1035  Last data filed at 6/14/2020 0555  Gross per 24 hour   Intake 550 ml   Output 775 ml   Net -225 ml       Respiratory:  · Resp Assessment: Normal respiratory effort  · Resp Auscultation: Clear to auscultation bilaterally   Cardiovascular:  · Auscultation: irregular rhythm and normal rate, normal S1S2, no murmur, rub or gallop  · Palpation:  Nl PMI  · JVP:  normal  · Extremities: No Edema  Abdomen:  · Soft, non-tender  · Normal bowel sounds  Extremities:  ·  No Cyanosis or Clubbing  Neurological/Psychiatric:  · Oriented to time, place, and person  · Non-anxious  Skin Warm and dry    Assessment:    Active Problems:    Atrial fibrillation with rapid ventricular response (HCC)    Dyspnea    Acute congestive heart failure (HCC)    Dilated cardiomyopathy (HCC)    Coronary artery disease    Dilatation of aorta (HCC)    ETOH abuse    Ischemic cardiomyopathy    Ventricular tachyarrhythmia (HCC)    Mixed hyperlipidemia  Resolved Problems:    * No resolved hospital problems. *      Plan:  1. Doing well and transferred to    2. Continue DAPT  3. On coreg  4. On Entresto  5. PT/OT  6. Encouraged to ambulate in the halls as tolerated  7. Will need to stay in house until at least Monday for lifevest evaluation and EP consult   ~We will place order for LifeVest.   8. Discuss need for [facility] prior to DC    All questions and concerns were addressed to the patient/family. Alternatives to my treatment were discussed. The note was completed using EMR. Every effort was made to ensure accuracy; however, inadvertent computerized transcription errors may be present.     Frank Alvarez MD, DESI, [facility], [address] [place] [address]  6/14/2020 10:35 AM

## 2020-06-15 PROBLEM — I48.11 LONGSTANDING PERSISTENT ATRIAL FIBRILLATION (HCC): Status: ACTIVE | Noted: 2020-06-15

## 2020-06-15 PROCEDURE — 99233 SBSQ HOSP IP/OBS HIGH 50: CPT | Performed by: INTERNAL MEDICINE

## 2020-06-15 PROCEDURE — 6370000000 HC RX 637 (ALT 250 FOR IP): Performed by: INTERNAL MEDICINE

## 2020-06-15 PROCEDURE — 97530 THERAPEUTIC ACTIVITIES: CPT

## 2020-06-15 PROCEDURE — C9113 INJ PANTOPRAZOLE SODIUM, VIA: HCPCS | Performed by: INTERNAL MEDICINE

## 2020-06-15 PROCEDURE — 99223 1ST HOSP IP/OBS HIGH 75: CPT | Performed by: INTERNAL MEDICINE

## 2020-06-15 PROCEDURE — 2060000000 HC ICU INTERMEDIATE R&B

## 2020-06-15 PROCEDURE — 6360000002 HC RX W HCPCS: Performed by: INTERNAL MEDICINE

## 2020-06-15 PROCEDURE — 2580000003 HC RX 258: Performed by: INTERNAL MEDICINE

## 2020-06-15 RX ORDER — CARVEDILOL 6.25 MG/1
6.25 TABLET ORAL 2 TIMES DAILY WITH MEALS
Status: DISCONTINUED | OUTPATIENT
Start: 2020-06-15 | End: 2020-06-16 | Stop reason: HOSPADM

## 2020-06-15 RX ADMIN — APIXABAN 5 MG: 5 TABLET, FILM COATED ORAL at 20:38

## 2020-06-15 RX ADMIN — TICAGRELOR 90 MG: 90 TABLET ORAL at 20:39

## 2020-06-15 RX ADMIN — Medication 10 ML: at 08:06

## 2020-06-15 RX ADMIN — APIXABAN 5 MG: 5 TABLET, FILM COATED ORAL at 13:23

## 2020-06-15 RX ADMIN — CARVEDILOL 3.12 MG: 3.12 TABLET, FILM COATED ORAL at 08:05

## 2020-06-15 RX ADMIN — Medication 10 ML: at 20:39

## 2020-06-15 RX ADMIN — TICAGRELOR 90 MG: 90 TABLET ORAL at 08:05

## 2020-06-15 RX ADMIN — ASPIRIN 81 MG 81 MG: 81 TABLET ORAL at 08:05

## 2020-06-15 RX ADMIN — GABAPENTIN 100 MG: 100 CAPSULE ORAL at 08:05

## 2020-06-15 RX ADMIN — CARVEDILOL 6.25 MG: 6.25 TABLET, FILM COATED ORAL at 17:16

## 2020-06-15 RX ADMIN — SACUBITRIL AND VALSARTAN 1 TABLET: 24; 26 TABLET, FILM COATED ORAL at 20:38

## 2020-06-15 RX ADMIN — PANTOPRAZOLE SODIUM 40 MG: 40 INJECTION, POWDER, FOR SOLUTION INTRAVENOUS at 08:04

## 2020-06-15 RX ADMIN — GABAPENTIN 100 MG: 100 CAPSULE ORAL at 20:38

## 2020-06-15 RX ADMIN — SACUBITRIL AND VALSARTAN 1 TABLET: 24; 26 TABLET, FILM COATED ORAL at 08:04

## 2020-06-15 RX ADMIN — GABAPENTIN 100 MG: 100 CAPSULE ORAL at 13:23

## 2020-06-15 RX ADMIN — ROSUVASTATIN CALCIUM 10 MG: 10 TABLET, FILM COATED ORAL at 20:39

## 2020-06-15 ASSESSMENT — PAIN SCALES - GENERAL
PAINLEVEL_OUTOF10: 0

## 2020-06-15 NOTE — CARE COORDINATION
Spoke with patient at bedside as patient is new to this CM. He is aware that Cardiology/EP will be in to see him and will go home with life vest.  He states that he has no other discharge needs. He states he has good family support and that his wife will come pick him up when he is discharged.

## 2020-06-15 NOTE — PLAN OF CARE
of left ventricular mass or thrombus noted. Severe global hypokinesis, . Left ventricular diastolic filling pressure are indeterminate. The right ventricle is normal in size with decreased function. Severe bi-atrial enlargement. Mild ( eccentric) aortic regurgitation. Moderate to severe pulmonic regurgitation. MIld mitral regurgitation. Mild to moderate tricuspid regurgitation. The ascending aorta is dilated (3.84SZ)  Systolic pulmonary artery pressure (SPAP) is normal and estimated at 38 mmHg (right atrial pressure 15 mmHg). IVC is dilated (> 2.1 cm) and collapses < 50% with respiration consistent with markedly elevated right atrial pressure (15 mmHg). [address] [place] [address] 6/10/2020 (Pema):  Large vessels  LM: luminals  LAD: mid 99% at bifurcation with very large branching septal and small diag. Distal LAD is very small and likely under filled and not able to appreciate                Very faint R-L and L-L  LCX: mid 30-40%                OM1- large vessel mid 30%     LESION 1: mid LAD  Stent: Xience Rosy 3 x 33mm  Post dilated to 4.5mm prox and 3mm distally     Assessment  1. Successful PCI to mid LAD, 0% residual, TIMi3 flow. Distal ALD improved TIMi3 flow  2.  COnt Integrillin for 2 hr and Cangrelor for 2hrs AFTER ticagrelor dose  3. ASA 81mg poqday and ticagrelor 90mg po BID  4. HOLD BB for now, leave impella in for Hypotension (not able to wean impella)                - rest overnight and attempt re wean    All labs and testing reviewed.   Lab Review     Renal Profile:   Lab Results   Component Value Date    CREATININE 1.0 06/12/2020    BUN 17 06/12/2020     06/12/2020    K 3.9 06/12/2020     06/12/2020    CO2 24 06/12/2020     CBC:    Lab Results   Component Value Date    WBC 7.0 06/12/2020    RBC 3.82 06/12/2020    HGB 12.5 06/12/2020    HCT 36.8 06/12/2020    MCV 96.3 06/12/2020    RDW 13.2 06/12/2020     06/14/2020     BNP:  No results found for: BNP  Fasting Lipid Panel:    Lab Results

## 2020-06-15 NOTE — CONSULTS
understand how to measure intake at home. 3. Review sodium restrictions. Encouraged to not add table salt to their foods and avoid foods that are high in sodium. 4. Continue to educate on S/S. Stress the importance of calling the MD with the earliest signs of an acute exacerbation. 5. Emphasize daily weights - instructed to call the MD if the patient gains 3 lb in a day or 5 lb in a week. 6. Provided patient with CHF Resource Line for questions and concerns. 7. Spiritual care to give/review ACP documents. 8. Pt agreeable to [facility] at discharge consulting message given to discharge planner   9. Strict avoidance of OTC meds- pseudoephedrine and NSAIDS   10. AF and Lifevest education  11. CR phase 2 when cardiology okays, approx 6 weeks.         Tasia Lala RN, BSN, CDE, [facility]  Heart Failure Nurse [address] [place] [address]  [phone]  6/15/2020

## 2020-06-16 VITALS
DIASTOLIC BLOOD PRESSURE: 55 MMHG | HEIGHT: 76 IN | HEART RATE: 58 BPM | TEMPERATURE: 97.2 F | BODY MASS INDEX: 28.35 KG/M2 | RESPIRATION RATE: 14 BRPM | SYSTOLIC BLOOD PRESSURE: 101 MMHG | OXYGEN SATURATION: 94 % | WEIGHT: 232.8 LBS

## 2020-06-16 LAB
ALBUMIN SERPL-MCNC: 3.3 G/DL (ref 3.4–5)
ANION GAP SERPL CALCULATED.3IONS-SCNC: 8 MMOL/L (ref 3–16)
BUN BLDV-MCNC: 17 MG/DL (ref 7–20)
CALCIUM SERPL-MCNC: 8.9 MG/DL (ref 8.3–10.6)
CHLORIDE BLD-SCNC: 106 MMOL/L (ref 99–110)
CO2: 22 MMOL/L (ref 21–32)
CREAT SERPL-MCNC: 1.1 MG/DL (ref 0.8–1.3)
GFR AFRICAN AMERICAN: >60
GFR NON-AFRICAN AMERICAN: >60
GLUCOSE BLD-MCNC: 100 MG/DL (ref 70–99)
HCT VFR BLD CALC: 37.9 % (ref 40.5–52.5)
HEMOGLOBIN: 13.1 G/DL (ref 13.5–17.5)
MCH RBC QN AUTO: 33.2 PG (ref 26–34)
MCHC RBC AUTO-ENTMCNC: 34.4 G/DL (ref 31–36)
MCV RBC AUTO: 96.4 FL (ref 80–100)
PDW BLD-RTO: 13.1 % (ref 12.4–15.4)
PHOSPHORUS: 3.7 MG/DL (ref 2.5–4.9)
PLATELET # BLD: 198 K/UL (ref 135–450)
PMV BLD AUTO: 7.3 FL (ref 5–10.5)
POTASSIUM SERPL-SCNC: 4.3 MMOL/L (ref 3.5–5.1)
RBC # BLD: 3.94 M/UL (ref 4.2–5.9)
SODIUM BLD-SCNC: 136 MMOL/L (ref 136–145)
WBC # BLD: 6.1 K/UL (ref 4–11)

## 2020-06-16 PROCEDURE — 6370000000 HC RX 637 (ALT 250 FOR IP): Performed by: INTERNAL MEDICINE

## 2020-06-16 PROCEDURE — 80069 RENAL FUNCTION PANEL: CPT

## 2020-06-16 PROCEDURE — 99233 SBSQ HOSP IP/OBS HIGH 50: CPT | Performed by: NURSE PRACTITIONER

## 2020-06-16 PROCEDURE — 99232 SBSQ HOSP IP/OBS MODERATE 35: CPT | Performed by: INTERNAL MEDICINE

## 2020-06-16 PROCEDURE — 2580000003 HC RX 258: Performed by: INTERNAL MEDICINE

## 2020-06-16 PROCEDURE — 85027 COMPLETE CBC AUTOMATED: CPT

## 2020-06-16 RX ORDER — CARVEDILOL 6.25 MG/1
6.25 TABLET ORAL 2 TIMES DAILY WITH MEALS
Qty: 60 TABLET | Refills: 0 | Status: SHIPPED | OUTPATIENT
Start: 2020-06-16 | End: 2022-06-07

## 2020-06-16 RX ORDER — TAMSULOSIN HYDROCHLORIDE 0.4 MG/1
0.4 CAPSULE ORAL DAILY
Qty: 30 CAPSULE | Refills: 0 | Status: SHIPPED | OUTPATIENT
Start: 2020-06-17 | End: 2020-12-15

## 2020-06-16 RX ORDER — ASPIRIN 81 MG/1
81 TABLET, CHEWABLE ORAL DAILY
Qty: 30 TABLET | Refills: 0 | Status: SHIPPED | OUTPATIENT
Start: 2020-06-17 | End: 2020-09-25 | Stop reason: ALTCHOICE

## 2020-06-16 RX ORDER — GABAPENTIN 100 MG/1
100 CAPSULE ORAL 3 TIMES DAILY
Qty: 90 CAPSULE | Refills: 0 | Status: SHIPPED | OUTPATIENT
Start: 2020-06-16 | End: 2022-06-07

## 2020-06-16 RX ORDER — ROSUVASTATIN CALCIUM 10 MG/1
10 TABLET, COATED ORAL NIGHTLY
Qty: 30 TABLET | Refills: 0 | Status: SHIPPED | OUTPATIENT
Start: 2020-06-16 | End: 2022-06-07 | Stop reason: ALTCHOICE

## 2020-06-16 RX ADMIN — CARVEDILOL 6.25 MG: 6.25 TABLET, FILM COATED ORAL at 07:57

## 2020-06-16 RX ADMIN — Medication 10 ML: at 07:57

## 2020-06-16 RX ADMIN — GABAPENTIN 100 MG: 100 CAPSULE ORAL at 07:56

## 2020-06-16 RX ADMIN — TICAGRELOR 90 MG: 90 TABLET ORAL at 07:56

## 2020-06-16 RX ADMIN — APIXABAN 5 MG: 5 TABLET, FILM COATED ORAL at 07:56

## 2020-06-16 RX ADMIN — ASPIRIN 81 MG 81 MG: 81 TABLET ORAL at 07:57

## 2020-06-16 RX ADMIN — SACUBITRIL AND VALSARTAN 1 TABLET: 24; 26 TABLET, FILM COATED ORAL at 07:56

## 2020-06-16 ASSESSMENT — PAIN SCALES - GENERAL: PAINLEVEL_OUTOF10: 0

## 2020-06-16 NOTE — PROGRESS NOTES
[facility]     Electrophysiology                                     Progress Note    Admission date:  2020    Reason for follow up visit: cardiomyopathy    HPI/CC: Mirtha Acuña was admitted on 2020 with SOB. EKG showed afib with a HR of 130. Echo on 2020 showed an EF of less than 20% and moderate to severe pulmonic regurgitation. On 6/10/2020 he had a LHC and PCI of LAD with Impella assist (removed ). EP consult was requested for LifeVest evaluation. Rhythm has been afib with controlled rates. Subjective: He wants to go home. Denies chest pain, palpitations, shortness of breath, and dizziness. Vitals:  Blood pressure (!) 101/55, pulse 58, temperature 97.2 °F (36.2 °C), temperature source Oral, resp. rate 14, height 6' 4\" (1.93 m), weight 232 lb 12.8 oz (105.6 kg), SpO2 94 %.   Temp  Av.7 °F (36.5 °C)  Min: 97.2 °F (36.2 °C)  Max: 97.9 °F (36.6 °C)  Pulse  Av.2  Min: 58  Max: 80  BP  Min: 101/55  Max: 134/83  SpO2  Av.8 %  Min: 91 %  Max: 96 %    24 hour I/O    Intake/Output Summary (Last 24 hours) at 2020 1002  Last data filed at 2020 0501  Gross per 24 hour   Intake 240 ml   Output 500 ml   Net -260 ml     Current Facility-Administered Medications   Medication Dose Route Frequency Provider Last Rate Last Dose    carvedilol (COREG) tablet 6.25 mg  6.25 mg Oral BID  Juliana Shannon MD   6.25 mg at 20 0757    apixaban (ELIQUIS) tablet 5 mg  5 mg Oral BID Juliana Shannon MD   5 mg at 20 0756    enoxaparin (LOVENOX) injection 40 mg  40 mg Subcutaneous Daily Sharmaine Iqbal MD        dexmedetomidine [facility]) 400 mcg in sodium chloride 0.9 % 100 mL infusion  0.2 mcg/kg/hr Intravenous Titrated Delfina Gruber MD   Stopped at 20 1355    sacubitril-valsartan (ENTRESTO) 24-26 MG per tablet 1 tablet  1 tablet Oral BID Holly Rollins MD   1 tablet at 20 0756    aspirin chewable tablet 81 mg  81 mg Oral Daily Aidan MAHMOOD Gregory Burger MD   81 mg at 06/16/20 0757    chlorhexidine (PERIDEX) 0.12 % solution 15 mL  15 mL Mouth/Throat BID Sarah Perry MD   15 mL at 06/12/20 2048    carboxymethylcellulose PF (THERATEARS) 1 % ophthalmic gel 1 drop  1 drop Both Eyes 6 times per day Sarah Perry MD   Stopped at 06/15/20 1600    ticagrelor (BRILINTA) tablet 90 mg  90 mg Oral BID Zayra Merino MD   90 mg at 06/16/20 0756    acetaminophen (TYLENOL) tablet 650 mg  650 mg Oral Q4H PRN Zayra Merino MD        pantoprazole (PROTONIX) injection 40 mg  40 mg Intravenous Daily Sarah Perry MD   40 mg at 06/15/20 0804    fentaNYL (SUBLIMAZE) 1,000 mcg in sodium chloride 0.9 % 100 mL infusion  25 mcg/hr Intravenous Continuous Sarah Perry MD   Stopped at 06/12/20 1223    propofol injection  10 mcg/kg/min Intravenous Titrated Sarah Perry MD   Stopped at 06/12/20 0930    rosuvastatin (CRESTOR) tablet 10 mg  10 mg Oral Nightly Zayra Merino MD   10 mg at 06/15/20 2039    sodium chloride flush 0.9 % injection 10 mL  10 mL Intravenous 2 times per day Zayra Merino MD   10 mL at 06/16/20 0757    sodium chloride flush 0.9 % injection 10 mL  10 mL Intravenous PRN Zayra Merino MD        perflutren lipid microspheres (DEFINITY) injection 1.65 mg  1.5 mL Intravenous ONCE PRN Zayra Merino MD        gabapentin (NEURONTIN) capsule 100 mg  100 mg Oral TID Zayra Merino MD   100 mg at 06/16/20 0756    [Held by provider] tamsulosin (FLOMAX) capsule 0.4 mg  0.4 mg Oral Daily Zayra Merino MD   Stopped at 06/12/20 0900    melatonin tablet 5 mg  5 mg Oral Nightly PRN Zayra Merino MD   5 mg at 06/14/20 2121       Objective:     Telemetry monitor: afib    Physical Exam:  Constitutional and general appearance: alert, cooperative, no distress and appears stated age  HEENT: PERRL, no cervical lymphadenopathy. No masses palpable.  Normal oral mucosa  Respiratory:  · Normal excursion and expansion without use of accessory muscles  · Resp avoid triple anticoagulation  3. Increase Coreg and Entresto as BP allows  4. Echo for EF 90 days after GDMT optimized. Recommend ICD if EF remains 35% or less. 5. Avoid alcohol   6. Patient agrees to [email or web address] is in the process of ordering it. EP will sign off but remains available if needed.      Adan Mccrary, APRN-ESSIE  [facility]  [phone]

## 2020-06-16 NOTE — PROGRESS NOTES
[employer]   Progress Note  Cardiology    CC: sob    HPI: doing well. No cp/sob    Medications/Labs all Reviewed    Lab Results   Component Value Date    WBC 6.1 06/16/2020    HGB 13.1 (L) 06/16/2020    HCT 37.9 (L) 06/16/2020    MCV 96.4 06/16/2020     06/16/2020     Lab Results   Component Value Date    CREATININE 1.1 06/16/2020    BUN 17 06/16/2020     06/16/2020    K 4.3 06/16/2020     06/16/2020    CO2 22 06/16/2020     Lab Results   Component Value Date    INR 1.08 06/08/2020    PROTIME 12.5 06/08/2020        Physical Examination:    BP (!) 101/55   Pulse 58   Temp 97.2 °F (36.2 °C) (Oral)   Resp 14   Ht 6' 4\" (1.93 m)   Wt 232 lb 12.8 oz (105.6 kg)   SpO2 94%   BMI 28.34 kg/m²      Deferred due to covid risk    Assessment:    AF, s/p failed mini-maze - rates stable  A/C sCHF - stable, compensated  CAD - stable post PCI LAD 6/10/2020  SOB - resolved  ICMP - EF <20%.  Anticipate improval post PCI and resuming meds (had been noncompliant)   HLD  Abnormal EKG  Tachycardia - resolved  Noncompliance w/ meds and follow up - discussed importance  Covid negative     Plan  Discharge when Life Vest arranged       Gerardo Browning MD, 6/16/2020 9:37 AM

## 2020-06-16 NOTE — CONSULTS
[address]                                  CONSULTATION    PATIENT NAME: Raimundo Shrestha                      :        1950  MED REC NO:   [id number]                          ROOM:       [id number]  ACCOUNT NO:   [de-identified]                           ADMIT DATE: 2020  PROVIDER:     Neo Ramos MD    CONSULT DATE:  06/15/2020    REASON FOR CONSULTATION:  Cardiomyopathy. HISTORY OF PRESENT ILLNESS:  The patient is a 71-year-old man with  longstanding atrial fibrillation and cardiomyopathy who is admitted with  heart failure symptoms. At the time of admission, he describes  progressively severe dyspnea on exertion and fatigue. The  echocardiogram demonstrates abnormal left ventricular function with an  ejection fraction of 20%. He underwent cardiac catheterization which  demonstrated a high grade complex mid LAD lesion. This was ultimately  intervened upon on 06/10/2020. The lesion was treated with balloon  angioplasties and stent placement. The patient did require Impella  support. Left ventricular function has been abnormal for several years. He had an echocardiogram in , which demonstrates ejection fraction  in the 35% range. He had an echocardiogram in , which demonstrates  ejection fraction of 25%. He underwent a Mcdonald mini maze procedure in   for treatment of atrial fibrillation. It is unclear whether this  was effective at anytime, but it is clear that the patient had recurrent  atrial fibrillation following that. He did not pursue medical attention  with Cardiology for a period of several years. ECG telemetry monitoring  at this time demonstrates persistent atrial fibrillation with adequate  heart rate control and no significant ventricular ectopy. PAST MEDICAL HISTORY:  1. Persistent atrial fibrillation. 2.  Cardiomyopathy. 3.  Hyperlipidemia.   4.  Acute on

## 2020-06-17 ENCOUNTER — CARE COORDINATION (OUTPATIENT)
Dept: CASE MANAGEMENT | Age: 70
End: 2020-06-17

## 2020-06-18 ENCOUNTER — CARE COORDINATION (OUTPATIENT)
Dept: CASE MANAGEMENT | Age: 70
End: 2020-06-18

## 2020-06-18 NOTE — DISCHARGE SUMMARY
Instructions/Follow-up:  w/ PCP 1-2 weeks and subspecialists as arranged. Code Status:  Full Code    Activity: activity as tolerated    Diet: regular diet      Discharge Medications:     Discharge Medication List as of 6/16/2020  2:06 PM           Details   aspirin 81 MG chewable tablet Take 1 tablet by mouth daily, Disp-30 tablet, R-0Print      apixaban (ELIQUIS) 5 MG TABS tablet Take 1 tablet by mouth 2 times daily, Disp-60 tablet, R-0Print      gabapentin (NEURONTIN) 100 MG capsule Take 1 capsule by mouth 3 times daily for 30 days. , Disp-90 capsule, R-0Print      rosuvastatin (CRESTOR) 10 MG tablet Take 1 tablet by mouth nightly, Disp-30 tablet, R-0Print      carvedilol (COREG) 6.25 MG tablet Take 1 tablet by mouth 2 times daily (with meals), Disp-60 tablet, R-0Print      sacubitril-valsartan (ENTRESTO) 24-26 MG per tablet Take 1 tablet by mouth 2 times daily, Disp-60 tablet, R-0Print      tamsulosin (FLOMAX) 0.4 MG capsule Take 1 capsule by mouth daily, Disp-30 capsule, R-0Print      ticagrelor (BRILINTA) 90 MG TABS tablet Take 1 tablet by mouth 2 times daily, Disp-60 tablet, R-0Print             Time Spent on discharge is more than 30 minutes in the examination, evaluation, counseling and review of medications and discharge plan. Signed:    Ivis Sánchez MD   6/18/2020      Thank you Romeo Ramos MD for the opportunity to be involved in this patient's care. If you have any questions or concerns please feel free to contact me at [phone].

## 2020-06-25 ENCOUNTER — CARE COORDINATION (OUTPATIENT)
Dept: CASE MANAGEMENT | Age: 70
End: 2020-06-25

## 2020-06-25 NOTE — CARE COORDINATION
You Patient resolved from the Care Transitions episode on 6/25/20  Discussed COVID-19 related testing which was available at this time. Test results were negative. Patient informed of results, if available? Yes    Patient/family has been provided the following resources and education related to COVID-19:                         Signs, symptoms and red flags related to COVID-19            CDC exposure and quarantine guidelines            [facility] exposure contact - [phone]            Contact for their local Department of Health                 Patient currently reports that the following symptoms have improved:  no new/worsening symptoms     No further outreach scheduled with this CTN/ACM. Episode of Care resolved. Patient has this CTN/ACM contact information if future needs arise. Spoke with patient who reported he is doing fine. Patient stated he was able to get the Entresto medication from the [address] [place] [address] and that he has a number to contact them back for next month refill. Patient reported he needs a new PCP. CTN [facility] and also provided patient with number for [facility]. Patient denied any further issues or concerns. CTN advised patient of use of urgent care or physicians 24 hr access line if assistance is needed after hours.       Castillo FERREIRAN, RN, [facility]  Care Transition Nurse   [phone]

## 2020-09-24 NOTE — PROGRESS NOTES
Medications:  Reviewed and are listed in nursing record. and/or listed below  Current Outpatient Medications   Medication Sig Dispense Refill    sacubitril-valsartan (ENTRESTO) 24-26 MG per tablet Take 1 tablet by mouth 2 times daily      apixaban (ELIQUIS) 5 MG TABS tablet Take by mouth 2 times daily      gabapentin (NEURONTIN) 100 MG capsule Take 1 capsule by mouth 3 times daily for 30 days. 90 capsule 0    rosuvastatin (CRESTOR) 10 MG tablet Take 1 tablet by mouth nightly 30 tablet 0    carvedilol (COREG) 6.25 MG tablet Take 1 tablet by mouth 2 times daily (with meals) 60 tablet 0    tamsulosin (FLOMAX) 0.4 MG capsule Take 1 capsule by mouth daily 30 capsule 0    ticagrelor (BRILINTA) 90 MG TABS tablet Take 1 tablet by mouth 2 times daily 60 tablet 0     No current facility-administered medications for this visit. Allergies:  Patient has no known allergies. Review of Systems:   A 14 point review of symptoms completed. Pertinent positives identified in the HPI, all other review of symptoms negative as below.     Objective:   PHYSICAL EXAM:    Vitals:    09/25/20 0920   BP: 116/64   Pulse: 51   SpO2: 97%    Weight: 234 lb (106.1 kg)     Wt Readings from Last 3 Encounters:   09/25/20 234 lb (106.1 kg)   06/16/20 232 lb 12.8 oz (105.6 kg)         General Appearance:  Alert, cooperative, no distress, appears stated age   Head:  Normocephalic, atraumatic   Eyes:  PERRL, conjunctiva/corneas clear   Nose: Nares normal, no drainage or sinus tenderness   Throat: Lips, mucosa, and tongue normal   Neck: Supple, symmetrical, trachea midline, NL thyroid no carotid bruit or JVD   Lungs:   CTAB, respirations unlabored   Chest Wall:  No tenderness or deformity   Heart:  Regular rhythm and normal rate; S1, S2 are normal;   no murmur noted; no rub or gallop   Abdomen:   Soft, non-tender, +BS x 4, no masses, no organomegaly   Extremities: Extremities normal, atraumatic, no cyanosis 1-2+ BLEpitting edema at ankles Pulses: 2+ and symmetric   Skin: Skin color, texture, turgor normal, no rashes or lesions   Pysch: Normal mood and affect   Neurologic: Normal gross motor and sensory exam.         LABS   CBC:      Lab Results   Component Value Date    WBC 6.1 2020    RBC 3.94 2020    HGB 13.1 2020    HCT 37.9 2020    MCV 96.4 2020    RDW 13.1 2020     2020     CMP:  Lab Results   Component Value Date     2020    K 4.3 2020    K 3.9 2020     2020    CO2 22 2020    BUN 17 2020    CREATININE 1.1 2020    GFRAA >60 2020    AGRATIO 1.3 2020    LABGLOM >60 2020    GLUCOSE 100 2020    PROT 6.4 2020    CALCIUM 8.9 2020    BILITOT 1.3 2020    ALKPHOS 51 2020    AST 24 2020    ALT 18 2020     PT/INR:   No results found for: PTINR  Liver:  No components found for: CHLPL  Lab Results   Component Value Date    ALT 18 2020    AST 24 2020    ALKPHOS 51 2020    BILITOT 1.3 (H) 2020     No results found for: LABA1C  Lipids:         Lab Results   Component Value Date    TRIG 75 06/10/2020    TRIG 137 2020            Lab Results   Component Value Date    HDL 24 (L) 06/10/2020    HDL 34 (L) 2020    HDL 35 (L) 2020            Lab Results   Component Value Date    LDLCALC 68 06/10/2020    LDLCALC 139 (H) 2020    LDLCALC 137 (H) 2020            Lab Results   Component Value Date    LABVLDL 15 06/10/2020    LABVLDL 27 2020    LABVLDL 20 2020         CARDIAC DATA   EK/10/2020:   Atrial fibrillation HR 71    ECHO 2020 (limited)  Summary   Limited study for impella placement. Impella located at 3.3 cm. [address]: 2020  No comparison   The left ventricular systolic function is severely reduced with an ejection fraction of < 20 %.  Definity contrast administered with no evidence of left ventricular mass or thrombus noted.  Severe global hypokinesis, . Left ventricular diastolic filling pressure are indeterminate. The right ventricle is normal in size with decreased function. Severe bi-atrial enlargement. Mild ( eccentric) aortic regurgitation. Moderate to severe pulmonic regurgitation. MIld mitral regurgitation. Mild to moderate tricuspid regurgitation. The ascending aorta is dilated (5.51IQ)  Systolic pulmonary artery pressure (SPAP) is normal and estimated at 38 mmHg (right atrial pressure 15 mmHg). IVC is dilated (> 2.1 cm) and collapses < 50% with respiration consistent with markedly elevated right atrial pressure (15 mmHg). STRESS TEST:     CARDIAC CATH:  LEFT HEART CATH 6/10/2020  Large vessels  LM: luminals  LAD: mid 99% at bifurcation with very large branching septal and small diag. Distal LAD is very small and likely under filled and not able to appreciate                Very faint R-L and L-L  LCX: mid 30-40%                OM1- large vessel mid 30%     LESION 1: mid LAD  Stent: Xience Rosy 3 x 33mm  Post dilated to 4.5mm prox and 3mm distally     Assessment  1. Successful PCI to mid LAD, 0% residual, TIMi3 flow. Distal ALD improved TIMi3 flow  2.  COnt Integrillin for 2 hr and Cangrelor for 2hrs AFTER ticagrelor dose  3. ASA 81mg poqday and ticagrelor 90mg po BID  4. HOLD BB for now, leave impella in for Hypotension (not able to wean impella)                - rest overnight and attempt re wean      CARDIAC CATH:  LEFT HEART CATH 6/8/2020  Large vessels  LM: luminals  LAD: mid 99% at bifurcation with very large branching septal and small diag.  Distal LAD is very small and likely under filled and not able to appreciate                Very faint R-L and L-L  LCX: mid 30-40%                OM1- large vessel mid 30%  RCA: Dominant, prox shelf like 40% lesions, mid 40%                High PDA/PLV bifurcation     LVEDP:8  LVEF: 20% severe global  RIGHT HEART CATH  RA: 8  RV: 30/8  PA:  30/10     and mean of 17  PCWP:  12     Sats: on RA  Ao:  91%  RA: 68%  PA: 68%     CO/CI (Zheng) 6.84/2.87  SVR//47  Assessment  1. Critical mid LAD 95% with Shayan-2 flow and poor distal targets  2. Severe mixed ischemic and nonischemic Cardiomyopathy: LVEF <20%                - LVEF and echo out of proportion to CAD                - Very complex mid LAD lesions                - need to talk with pt and Ct surgery as very high risk PCI                - will consider cangrelor in case case needs to go to OR                - Need Impella 3.2 at minimum but if pt decompensates, would need IMpella 5 or ECMO backup  3. Start heparin ggt 6 hrs after sheath removal  5. Bb, statin, ACE ARB as tolerated            VASCULAR/OTHER IMAGING:      Assessment and Plan   Og Patiño is a 71 y.o. male who presents today for the following problems:      Assessment:  1. Atrial fibrillation: remains in afib, rate controlled               - [id number] Score for Atrial Fibrillation Stroke Risk 2              - s/p failed mini-maze   2. Chronic systolic congestive heart failure:              - weights are stable at 234 pounds by scale today  3. Ischemic Cardiomyopathy: EF <20 % per echo 6/6, previous EF 35-40% 2007  4. CAD              - 6/10/2020 PCi to mid LAD under impella support  5. MVP: on echo 2007  6. HLD: Well controlled but low protective HDL  7. Noncompliance with medication and follow up: Proved  8. Dilated ascending aorta: 4.32 cm  10. Alcohol abuse: he stated usually 1 [place] whisky drink a day, cessation counseled   11. VT: None recently     Plan:   1. Patient is clinically doing very well with only signs of minor fluid overload   -Of note appears patient turned in LifeVest himself last month  2.  620 mg daily x3  3. Full echo to evaluate interval improvement in EF as well as aortic aneurysm  4. If EF remains below 35% will need ICD evaluation  5. Continue Entresto, samples given, push through PA  6.   Continue Coreg, Crestor,

## 2020-09-25 ENCOUNTER — OFFICE VISIT (OUTPATIENT)
Dept: CARDIOLOGY CLINIC | Age: 70
End: 2020-09-25
Payer: MEDICARE

## 2020-09-25 VITALS
WEIGHT: 234 LBS | BODY MASS INDEX: 28.49 KG/M2 | HEART RATE: 51 BPM | DIASTOLIC BLOOD PRESSURE: 64 MMHG | OXYGEN SATURATION: 97 % | SYSTOLIC BLOOD PRESSURE: 116 MMHG | HEIGHT: 76 IN

## 2020-09-25 PROBLEM — R60.9 EDEMA: Status: ACTIVE | Noted: 2020-09-25

## 2020-09-25 PROCEDURE — 1123F ACP DISCUSS/DSCN MKR DOCD: CPT | Performed by: INTERNAL MEDICINE

## 2020-09-25 PROCEDURE — 99214 OFFICE O/P EST MOD 30 MIN: CPT | Performed by: INTERNAL MEDICINE

## 2020-09-25 PROCEDURE — 1036F TOBACCO NON-USER: CPT | Performed by: INTERNAL MEDICINE

## 2020-09-25 PROCEDURE — 3017F COLORECTAL CA SCREEN DOC REV: CPT | Performed by: INTERNAL MEDICINE

## 2020-09-25 PROCEDURE — G8417 CALC BMI ABV UP PARAM F/U: HCPCS | Performed by: INTERNAL MEDICINE

## 2020-09-25 PROCEDURE — 4040F PNEUMOC VAC/ADMIN/RCVD: CPT | Performed by: INTERNAL MEDICINE

## 2020-09-25 PROCEDURE — G8427 DOCREV CUR MEDS BY ELIG CLIN: HCPCS | Performed by: INTERNAL MEDICINE

## 2020-09-25 RX ORDER — FUROSEMIDE 20 MG/1
20 TABLET ORAL DAILY
Qty: 30 TABLET | Refills: 5 | Status: SHIPPED | OUTPATIENT
Start: 2020-09-25 | End: 2021-02-22

## 2020-09-25 RX ORDER — SACUBITRIL AND VALSARTAN 24; 26 MG/1; MG/1
1 TABLET, FILM COATED ORAL 2 TIMES DAILY
COMMUNITY
End: 2020-10-13 | Stop reason: SDUPTHER

## 2020-09-25 NOTE — LETTER
[facility]   Cardiovascular Evaluation    PATIENT: Kadi Mon  DATE: 2020  MRN: <[id number]>  CSN: [id number]  : 1950      Primary Care Doctor: Gail Nayak MD  Reason for evaluation:   Follow-Up from Hospital      Subjective:   History of present illness on initial date of evaluation:   Kadi Mon is a 71 y.o. patient who presents for hospital follow up. He was admitted on 2020 with SOB. EKG showed afib with a HR of 130. Echo on 2020 showed an EF of less than 20% and moderate to severe pulmonic regurgitation. On 2020 he underwent a LHC/RHC demonstrated critical mid LAD, severe mixed ischemic/nonischemic CMP. On 6/10/2020 he had a LHC and PCI of LAD with Impella assist (removed ). EP consulted for LifeVest evaluation. (Of note: underwent a Mcdonald mini maze procedure in  for treatment of atrial fibrillation)  Today he reports feeling ok. He denies any chest pain. He states he does not have to prop himself up to sleep. He states climbing stairs don't seem to bother him. He states he has had some bruising, but no abnormal bleeding. Patient Active Problem List   Diagnosis    Atrial fibrillation with rapid ventricular response (HCC)    Dyspnea    Acute congestive heart failure (HCC)    Coronary artery disease    Dilatation of aorta (HCC)    ETOH abuse    Ischemic cardiomyopathy    Ventricular tachyarrhythmia ([address]    Mixed hyperlipidemia    Longstanding persistent atrial fibrillation    Edema         Past Medical History:   has a past medical history of Atrial fibrillation ([address] Cardiomyopathy ([address] and Peripheral neuropathy. Surgical History:   has no past surgical history on file. Social History:   reports that he has never smoked. He has never used smokeless tobacco. He reports current alcohol use. He reports that he does not use drugs.      Family History:  No evidence for sudden cardiac death or premature CAD edema at ankles   Pulses: 2+ and symmetric   Skin: Skin color, texture, turgor normal, no rashes or lesions   Pysch: Normal mood and affect   Neurologic: Normal gross motor and sensory exam.         LABS   CBC:      Lab Results   Component Value Date    WBC 6.1 2020    RBC 3.94 2020    HGB 13.1 2020    HCT 37.9 2020    MCV 96.4 2020    RDW 13.1 2020     2020     CMP:  Lab Results   Component Value Date     2020    K 4.3 2020    K 3.9 2020     2020    CO2 22 2020    BUN 17 2020    CREATININE 1.1 2020    GFRAA >60 2020    AGRATIO 1.3 2020    LABGLOM >60 2020    GLUCOSE 100 2020    PROT 6.4 2020    CALCIUM 8.9 2020    BILITOT 1.3 2020    ALKPHOS 51 2020    AST 24 2020    ALT 18 2020     PT/INR:   No results found for: PTINR  Liver:  No components found for: CHLPL  Lab Results   Component Value Date    ALT 18 2020    AST 24 2020    ALKPHOS 51 2020    BILITOT 1.3 (H) 2020     No results found for: LABA1C  Lipids:         Lab Results   Component Value Date    TRIG 75 06/10/2020    TRIG 137 2020            Lab Results   Component Value Date    HDL 24 (L) 06/10/2020    HDL 34 (L) 2020    HDL 35 (L) 2020            Lab Results   Component Value Date    LDLCALC 68 06/10/2020    LDLCALC 139 (H) 2020    LDLCALC 137 (H) 2020            Lab Results   Component Value Date    LABVLDL 15 06/10/2020    LABVLDL 27 2020    LABVLDL 20 2020         CARDIAC DATA   EK/10/2020:   Atrial fibrillation HR 71    ECHO 2020 (limited)  Summary   Limited study for impella placement. Impella located at 3.3 cm. [address]: 2020  No comparison   The left ventricular systolic function is severely reduced with an ejection fraction of < 20 %.  Definity contrast administered with no evidence of left ventricular mass or thrombus noted. Severe global hypokinesis, . Left ventricular diastolic filling pressure are indeterminate. The right ventricle is normal in size with decreased function. Severe bi-atrial enlargement. Mild ( eccentric) aortic regurgitation. Moderate to severe pulmonic regurgitation. MIld mitral regurgitation. Mild to moderate tricuspid regurgitation. The ascending aorta is dilated (8.81MA)  Systolic pulmonary artery pressure (SPAP) is normal and estimated at 38 mmHg (right atrial pressure 15 mmHg). IVC is dilated (> 2.1 cm) and collapses < 50% with respiration consistent with markedly elevated right atrial pressure (15 mmHg). STRESS TEST:     CARDIAC CATH:  LEFT HEART CATH 6/10/2020  Large vessels  LM: luminals  LAD: mid 99% at bifurcation with very large branching septal and small diag. Distal LAD is very small and likely under filled and not able to appreciate                Very faint R-L and L-L  LCX: mid 30-40%                OM1- large vessel mid 30%     LESION 1: mid LAD  Stent: Xience Rosy 3 x 33mm  Post dilated to 4.5mm prox and 3mm distally     Assessment  1. Successful PCI to mid LAD, 0% residual, TIMi3 flow. Distal ALD improved TIMi3 flow  2.  COnt Integrillin for 2 hr and Cangrelor for 2hrs AFTER ticagrelor dose  3. ASA 81mg poqday and ticagrelor 90mg po BID  4. HOLD BB for now, leave impella in for Hypotension (not able to wean impella)                - rest overnight and attempt re wean      CARDIAC CATH:  LEFT HEART CATH 6/8/2020  Large vessels  LM: luminals  LAD: mid 99% at bifurcation with very large branching septal and small diag.  Distal LAD is very small and likely under filled and not able to appreciate                Very faint R-L and L-L  LCX: mid 30-40%                OM1- large vessel mid 30%  RCA: Dominant, prox shelf like 40% lesions, mid 40%                High PDA/PLV bifurcation     LVEDP:8  LVEF: 20% severe global  RIGHT HEART CATH RA: 8  RV: 30/8  PA:  30/10     and mean of 17  PCWP:  12     Sats: on RA  Ao:  91%  RA: 68%  PA: 68%     CO/CI (Zheng) 6.84/2.87  SVR//47  Assessment  1. Critical mid LAD 95% with Shayan-2 flow and poor distal targets  2. Severe mixed ischemic and nonischemic Cardiomyopathy: LVEF <20%                - LVEF and echo out of proportion to CAD                - Very complex mid LAD lesions                - need to talk with pt and Ct surgery as very high risk PCI                - will consider cangrelor in case case needs to go to OR                - Need Impella 3.2 at minimum but if pt decompensates, would need IMpella 5 or ECMO backup  3. Start heparin ggt 6 hrs after sheath removal  5. Bb, statin, ACE ARB as tolerated            VASCULAR/OTHER IMAGING:      Assessment and Plan   Migdalia Smith is a 71 y.o. male who presents today for the following problems:      Assessment:  1. Atrial fibrillation: remains in afib, rate controlled               - [id number] Score for Atrial Fibrillation Stroke Risk 2              - s/p failed mini-maze   2. Chronic systolic congestive heart failure:              - weights are stable at 234 pounds by scale today  3. Ischemic Cardiomyopathy: EF <20 % per echo 6/6, previous EF 35-40% 2007  4. CAD              - 6/10/2020 PCi to mid LAD under impella support  5. MVP: on echo 2007  6. HLD: Well controlled but low protective HDL  7. Noncompliance with medication and follow up: Proved  8. Dilated ascending aorta: 4.32 cm  10. Alcohol abuse: he stated usually 1 [place] whisky drink a day, cessation counseled   11. VT: None recently     Plan:   1. Patient is clinically doing very well with only signs of minor fluid overload   -Of note appears patient turned in LifeVest himself last month  2.  620 mg daily x3  3. Full echo to evaluate interval improvement in EF as well as aortic aneurysm  4.   If EF remains below 35% will need ICD evaluation 5.  Continue Entresto, samples given, push through PA  6. Continue Coreg, Crestor, Brilinta.   -We will hold aspirin to prevent triple therapy  7. Avoid all EToH       Patient Active Problem List   Diagnosis    Atrial fibrillation with rapid ventricular response (HCC)    Dyspnea    Acute congestive heart failure (HCC)    Coronary artery disease    Dilatation of aorta (HCC)    ETOH abuse    Ischemic cardiomyopathy    Ventricular tachyarrhythmia ([address]    Mixed hyperlipidemia    Longstanding persistent atrial fibrillation    Edema       Patient Plan:  1. Echocardiogram to view size and strength of the heart   2. We will call you with the results  3. Recommend taking Lasix 20 mg every morning  for 3 days for your swelling   4. Hold your Asprin for now - Make sure to take the Eliquis 5mg twice a day   5. Continue all other medications as prescribed. 6. Follow up in 3 months       It is a pleasure to assist in the care of [facility]. Please call with any questions. This note was scribed in the presence of Arturo Dallas MD by Orlando Hunt RN.      Bubba Knutson MD, personally performed the services described in this documentation as scribed by the above signed scribe in my presence, and it is both accurate and complete to the best of our ability and knowledge. I agree with the details independently gathered by my clinical support staff, while the remaining scribed note accurately describes my personal service to the patient. The above RN is working as a scribe for and in the presence of myself . Working as a scribe, the RN may have prepopulated components of this note with my historical intellectual property under my direct supervision. Any additions to this intellectual property were performed at my direction. Furthermore, the content and accuracy of this note have been reviewed by me to the best of my ability.              Sharda Mantilla MD, [address]  Interventional Cardiologist [address]  [phone] [facility]  [phone] [facility]

## 2020-09-25 NOTE — PATIENT INSTRUCTIONS
Patient Plan:  1. Echocardiogram to view size and strength of the heart   2. We will call you with the results  3. Recommend taking Lasix 20 mg every morning  for 3 days for your swelling   4. Hold your Asprin for now - Make sure to take the Eliquis 5mg twice a day   5. Continue all other medications as prescribed. 6. Follow up in 3 months     Your provider has ordered testing for further evaluation. An order/prescription has been included in your paper work.  To schedule outpatient testing, contact Central Scheduling by calling [address] [place] [address] ([phone]).

## 2020-10-01 ENCOUNTER — HOSPITAL ENCOUNTER (OUTPATIENT)
Dept: CARDIOLOGY | Age: 70
Discharge: HOME | End: 2020-10-01
Payer: MEDICARE

## 2020-10-01 LAB
LV EF: 38 %
LVEF MODALITY: NORMAL

## 2020-10-01 PROCEDURE — 93306 TTE W/DOPPLER COMPLETE: CPT

## 2020-10-05 ENCOUNTER — TELEPHONE (OUTPATIENT)
Dept: CARDIOLOGY CLINIC | Age: 70
End: 2020-10-05

## 2020-10-05 NOTE — TELEPHONE ENCOUNTER
----- Message from Pao Fritz MD sent at 10/2/2020  6:39 PM EDT -----  Please let patient know that his echo has showing improvement which is reassuring but it is not yet normal.  He already sent back his LifeVest and we do not need to discuss an internal defibrillator at this time. He still has several valvular issues and his aorta appears to be dilating compared to previous which is very serious. Do need another echo in 3 months and it is critically important that he not delay or missed that echo. He needs to ensure his systolic blood pressure is controlled and less than 130.

## 2020-10-13 ENCOUNTER — TELEPHONE (OUTPATIENT)
Dept: CARDIOLOGY CLINIC | Age: 70
End: 2020-10-13

## 2020-10-13 RX ORDER — SACUBITRIL AND VALSARTAN 24; 26 MG/1; MG/1
1 TABLET, FILM COATED ORAL 2 TIMES DAILY
Qty: 60 TABLET | Refills: 5 | Status: SHIPPED | OUTPATIENT
Start: 2020-10-13

## 2020-12-14 NOTE — PROGRESS NOTES
[address] [facility]   Cardiovascular Evaluation    PATIENT: Virginia Dubin  DATE: 12/15/2020  MRN: <[id number]>  CSN: [id number]  : 1950      Primary Care Doctor: Jacqueline Hernandez MD  Reason for evaluation:   3 Month Follow-Up (no cardiac complaints ), Medication Refill, and Discuss Medications (Brilinta and Eliquis Cost )      Subjective:   History of present illness on initial date of evaluation:   Virginia Dubin is a 79 y.o. patient who presented for hospital follow up. He was admitted on 2020 with SOB. EKG showed afib with a HR of 130. Echo on 2020 showed an EF of less than 20% and moderate to severe pulmonic regurgitation. On 2020 he underwent a LHC/RHC demonstrated critical mid LAD, severe mixed   ischemic/nonischemic CMP. On 6/10/2020 he had a LHC and PCI of LAD with Impella assist (removed ). EP consulted for LifeVest evaluation. (Of note: underwent a Mcdonald mini maze procedure in  for treatment of atrial fibrillation)  OV 20 he reported feeling ok. He denied any chest pain. He stated he does not have to prop himself up to sleep. He stated climbing stairs doesn't seem to bother him. He stated he has had some bruising, but no abnormal bleeding. Today he reports feeling ok. He reports he only has medicare now and was unable to continue to afford the [address]. He is taking all his other medications as prescribed. He states he does have some SOB, but states it is better. He denies any chest pain, palpitations, dizziness or syncope. He states when he does notice swelling he will take the Lasix. Echo 10/1/20 demonstrated EF 35-40%, Grade 1 DD, severe bi-atrial dilation, aortic root dilated 4.7 cm, ascending aorta dilated 4.3 cm. Mild MR and AR, mod/severe AK. He denies any abnormal bleeding or bruising.        Patient Active Problem List   Diagnosis    Atrial fibrillation with rapid ventricular response (HCC)    Dyspnea  Acute congestive heart failure (HCC)    Coronary artery disease    Dilatation of aorta (HCC)    ETOH abuse    Ischemic cardiomyopathy    Ventricular tachyarrhythmia (HCC)    Mixed hyperlipidemia    Longstanding persistent atrial fibrillation (HCC)    Edema         Past Medical History:   has a past medical history of Atrial fibrillation ([address] Cardiomyopathy ([address] and Peripheral neuropathy. Surgical History:   has no past surgical history on file. Social History:   reports that he has never smoked. He has never used smokeless tobacco. He reports current alcohol use. He reports that he does not use drugs. Family History:  No evidence for sudden cardiac death or premature CAD    Home Medications:  Reviewed and are listed in nursing record. and/or listed below  Current Outpatient Medications   Medication Sig Dispense Refill    apixaban (ELIQUIS) 5 MG TABS tablet Take by mouth 2 times daily      gabapentin (NEURONTIN) 100 MG capsule Take 1 capsule by mouth 3 times daily for 30 days. 90 capsule 0    rosuvastatin (CRESTOR) 10 MG tablet Take 1 tablet by mouth nightly 30 tablet 0    carvedilol (COREG) 6.25 MG tablet Take 1 tablet by mouth 2 times daily (with meals) 60 tablet 0    tamsulosin (FLOMAX) 0.4 MG capsule Take 1 capsule by mouth daily 30 capsule 0    sacubitril-valsartan (ENTRESTO) 24-26 MG per tablet Take 1 tablet by mouth 2 times daily (Patient not taking: Reported on 12/15/2020) 60 tablet 5    furosemide (LASIX) 20 MG tablet Take 1 tablet by mouth daily (Patient not taking: Reported on 12/15/2020) 30 tablet 5     No current facility-administered medications for this visit. Allergies:  Patient has no known allergies. Review of Systems:   A 14 point review of symptoms completed. Pertinent positives identified in the HPI, all other review of symptoms negative as below.     Objective:   PHYSICAL EXAM:    Vitals:    12/15/20 0852   BP: 112/68   Pulse: 71   SpO2: 98% Component Value Date    TRIG 75 06/10/2020    TRIG 137 2020            Lab Results   Component Value Date    HDL 24 (L) 06/10/2020    HDL 34 (L) 2020    HDL 35 (L) 2020            Lab Results   Component Value Date    LDLCALC 68 06/10/2020    LDLCALC 139 (H) 2020    LDLCALC 137 (H) 2020            Lab Results   Component Value Date    LABVLDL 15 06/10/2020    LABVLDL 27 2020    LABVLDL 20 2020         CARDIAC DATA   EK/10/2020:   Atrial fibrillation HR 71    ECHO 10/1/2020  Summary   Left ventricular systolic function is moderately reduced with a visually   estimated ejection fraction of 35-40 %. EF estimated by Tatum's method at 38 %. Upper limit of normal wall thickness. Moderate global hypokinesis with regional variation. Grade I diastolic dysfunction with normal filling pressure. There is no evidence of a left ventricular thrombus. Severe bi-atrial dilation. The right ventricle is mildly dilated. Right ventricular systolic function is reduced. The aortic root is moderately dilated at 4.7 cm. The ascending aorta is dilated at 4.3 cm. Posterior mitral annular calcification is present. Aortic valve appears sclerotic but opens adequately. Mild mitral and aortic regurgitation. Moderate eccentric tricuspid regurgitation. Moderate to severe pulmonic regurgitation. Systolic pulmonary artery pressure (SPAP) is normal and estimated at 28 mmHg   (right atrial pressure 3 mmHg). Compared to last echo on 20 (EF 20%), left ventricle systolic function   has improved. ECHO 2020 (limited)  Summary   Limited study for impella placement. Impella located at 3.3 cm. [address] [place] [address]: 2020  No comparison   The left ventricular systolic function is severely reduced with an ejection fraction of < 20 %. Definity contrast administered with no evidence of left ventricular mass or thrombus noted. Severe global hypokinesis, . Left ventricular diastolic filling pressure are indeterminate. The right ventricle is normal in size with decreased function. Severe bi-atrial enlargement. Mild ( eccentric) aortic regurgitation. Moderate to severe pulmonic regurgitation. MIld mitral regurgitation. Mild to moderate tricuspid regurgitation. The ascending aorta is dilated (5.17WT)  Systolic pulmonary artery pressure (SPAP) is normal and estimated at 38 mmHg (right atrial pressure 15 mmHg). IVC is dilated (> 2.1 cm) and collapses < 50% with respiration consistent with markedly elevated right atrial pressure (15 mmHg). STRESS TEST:     CARDIAC CATH:  LEFT HEART CATH 6/10/2020  Large vessels  LM: luminals  LAD: mid 99% at bifurcation with very large branching septal and small diag. Distal LAD is very small and likely under filled and not able to appreciate                Very faint R-L and L-L  LCX: mid 30-40%                OM1- large vessel mid 30%     LESION 1: mid LAD  Stent: Xience Rosy 3 x 33mm  Post dilated to 4.5mm prox and 3mm distally     Assessment  1. Successful PCI to mid LAD, 0% residual, TIMi3 flow. Distal ALD improved TIMi3 flow  2.  COnt Integrillin for 2 hr and Cangrelor for 2hrs AFTER ticagrelor dose  3. ASA 81mg poqday and ticagrelor 90mg po BID  4. HOLD BB for now, leave impella in for Hypotension (not able to wean impella)                - rest overnight and attempt re wean      CARDIAC CATH:  LEFT HEART CATH 6/8/2020  Large vessels  LM: luminals  LAD: mid 99% at bifurcation with very large branching septal and small diag.  Distal LAD is very small and likely under filled and not able to appreciate                Very faint R-L and L-L  LCX: mid 30-40%                OM1- large vessel mid 30%  RCA: Dominant, prox shelf like 40% lesions, mid 40%                High PDA/PLV bifurcation     LVEDP:8  LVEF: 20% severe global  RIGHT HEART CATH  RA: 8  RV: 30/8  PA:  30/10     and mean of 17 PCWP:  12     Sats: on RA  Ao:  91%  RA: 68%  PA: 68%     CO/CI (Zheng) 6.84/2.87  SVR//47  Assessment  1. Critical mid LAD 95% with Shayan-2 flow and poor distal targets  2. Severe mixed ischemic and nonischemic Cardiomyopathy: LVEF <20%                - LVEF and echo out of proportion to CAD                - Very complex mid LAD lesions                - need to talk with pt and Ct surgery as very high risk PCI                - will consider cangrelor in case case needs to go to OR                - Need Impella 3.2 at minimum but if pt decompensates, would need IMpella 5 or ECMO backup  3. Start heparin ggt 6 hrs after sheath removal  5. Bb, statin, ACE ARB as tolerated       VASCULAR/OTHER IMAGING:      Assessment and Plan   Genie Triana is a 79 y.o. male who presents today for the following problems:      Assessment:  1. Atrial fibrillation: remains in afib, rate controlled               - [id number] Score for Atrial Fibrillation Stroke Risk 2              - s/p failed mini-maze and previous ablation  2. Chronic systolic congestive heart failure:              - weights are stable at 234-->237lbs pounds by scale today  3. Ischemic Cardiomyopathy:  Recovering. LVEF now 35 to 40%   - EF <20 % per echo 6/6, previous EF 35-40% 2007  4. CAD              - 6/10/2020 PCi to mid LAD under impella support  5. MVP: on echo 2007  6. HLD: Well controlled but low protective HDL  7. Noncompliance with medication and follow up: Significantly improved  8. Dilated ascending aorta: 4.32 cm--> 4.7cm  10. Alcohol abuse: he stated usually 1 Prydeinig whisky drink a day, cessation counseled   11. VT: None recently     Plan:   1. He continues to do well. With no signs of decompensated heart failure or angina  2. We will switch ticagrelor to Plavix secondary to cost.   -Patient is now on Medicare and try negotiate the system  3. We will get echocardiogram in 6 months for ascending aorta. 4.  Continue to use Lasix daily as needed for edema and weight gain  5. Continue Entresto, samples given, push through PA  6. Continue Coreg, Crestor   -We will hold aspirin to prevent triple therapy  7. Avoid all EToH       Patient Active Problem List   Diagnosis    Atrial fibrillation with rapid ventricular response (HCC)    Dyspnea    Acute congestive heart failure (HCC)    Coronary artery disease    Dilatation of aorta (HCC)    ETOH abuse    Ischemic cardiomyopathy    Ventricular tachyarrhythmia ([address]    Mixed hyperlipidemia    Longstanding persistent atrial fibrillation ([address]    Edema       Patient Plan:  1. New prescription for Plavix (clopidrogel) 75 mg daily  2. Continue all other medications as prescribed  3. Echocardiogram to view size and strength of the heart in 6 months   4. Follow up in 1 year       It is a pleasure to assist in the care of Keshawn Marie. Please call with any questions. This note was scribed in the presence of Severo Hooker MD by Sania Menon RN.      Ava Grant MD, personally performed the services described in this documentation as scribed by the above signed scribe in my presence, and it is both accurate and complete to the best of our ability and knowledge. I agree with the details independently gathered by my clinical support staff, while the remaining scribed note accurately describes my personal service to the patient. The above RN is working as a scribe for and in the presence of myself . Working as a scribe, the RN may have prepopulated components of this note with my historical intellectual property under my direct supervision. Any additions to this intellectual property were performed at my direction. Furthermore, the content and accuracy of this note have been reviewed by me to the best of my ability.    *          Alisa Whitaker MD, [address] [facility] Cardiologist  [address]  [phone] [facility] [phone] [facility]

## 2020-12-15 ENCOUNTER — OFFICE VISIT (OUTPATIENT)
Dept: CARDIOLOGY CLINIC | Age: 70
End: 2020-12-15
Payer: MEDICARE

## 2020-12-15 ENCOUNTER — HOSPITAL ENCOUNTER (OUTPATIENT)
Dept: CARDIOLOGY | Age: 70
Discharge: HOME | End: 2020-12-15
Payer: MEDICARE

## 2020-12-15 VITALS
HEART RATE: 71 BPM | SYSTOLIC BLOOD PRESSURE: 112 MMHG | WEIGHT: 237 LBS | BODY MASS INDEX: 28.86 KG/M2 | DIASTOLIC BLOOD PRESSURE: 68 MMHG | HEIGHT: 76 IN | OXYGEN SATURATION: 98 %

## 2020-12-15 LAB
LV EF: 40 %
LVEF MODALITY: NORMAL

## 2020-12-15 PROCEDURE — 3017F COLORECTAL CA SCREEN DOC REV: CPT | Performed by: INTERNAL MEDICINE

## 2020-12-15 PROCEDURE — G8484 FLU IMMUNIZE NO ADMIN: HCPCS | Performed by: INTERNAL MEDICINE

## 2020-12-15 PROCEDURE — 1123F ACP DISCUSS/DSCN MKR DOCD: CPT | Performed by: INTERNAL MEDICINE

## 2020-12-15 PROCEDURE — 99214 OFFICE O/P EST MOD 30 MIN: CPT | Performed by: INTERNAL MEDICINE

## 2020-12-15 PROCEDURE — G8427 DOCREV CUR MEDS BY ELIG CLIN: HCPCS | Performed by: INTERNAL MEDICINE

## 2020-12-15 PROCEDURE — G8417 CALC BMI ABV UP PARAM F/U: HCPCS | Performed by: INTERNAL MEDICINE

## 2020-12-15 PROCEDURE — 4040F PNEUMOC VAC/ADMIN/RCVD: CPT | Performed by: INTERNAL MEDICINE

## 2020-12-15 PROCEDURE — 93306 TTE W/DOPPLER COMPLETE: CPT

## 2020-12-15 PROCEDURE — 1036F TOBACCO NON-USER: CPT | Performed by: INTERNAL MEDICINE

## 2020-12-15 RX ORDER — CLOPIDOGREL BISULFATE 75 MG/1
75 TABLET ORAL DAILY
Qty: 30 TABLET | Refills: 11 | Status: SHIPPED | OUTPATIENT
Start: 2020-12-15 | End: 2022-04-15 | Stop reason: SDUPTHER

## 2020-12-15 NOTE — PATIENT INSTRUCTIONS
Patient Plan:  1. New prescription for Plavix (clopidrogel) 75 mg daily  2. Continue all other medications as prescribed  3. Echocardiogram to view size and strength of the heart in 6 months   4. Follow up in 1 year     Your provider has ordered testing for further evaluation. An order/prescription has been included in your paper work. ? To schedule outpatient testing, contact Central Scheduling by calling [address] [place], [place] [address] ([phone]).

## 2020-12-15 NOTE — LETTER
[address] [facility]   Cardiovascular Evaluation    PATIENT: Rylan Coello  DATE: 12/15/2020  MRN: <[id number]>  CSN: [id number]  : 1950      Primary Care Doctor: Daphne Buenrostro MD  Reason for evaluation:   3 Month Follow-Up (no cardiac complaints ), Medication Refill, and Discuss Medications (Brilinta and Eliquis Cost )      Subjective:   History of present illness on initial date of evaluation:   Rylan Coello is a 79 y.o. patient who presented for hospital follow up. He was admitted on 2020 with SOB. EKG showed afib with a HR of 130. Echo on 2020 showed an EF of less than 20% and moderate to severe pulmonic regurgitation. On 2020 he underwent a LHC/RHC demonstrated critical mid LAD, severe mixed   ischemic/nonischemic CMP. On 6/10/2020 he had a LHC and PCI of LAD with Impella assist (removed ). EP consulted for LifeVest evaluation. (Of note: underwent a Mcdonald mini maze procedure in  for treatment of atrial fibrillation)  OV 20 he reported feeling ok. He denied any chest pain. He stated he does not have to prop himself up to sleep. He stated climbing stairs doesn't seem to bother him. He stated he has had some bruising, but no abnormal bleeding. Today he reports feeling ok. He reports he only has medicare now and was unable to continue to afford the [address]. He is taking all his other medications as prescribed. He states he does have some SOB, but states it is better. He denies any chest pain, palpitations, dizziness or syncope. He states when he does notice swelling he will take the Lasix. Echo 10/1/20 demonstrated EF 35-40%, Grade 1 DD, severe bi-atrial dilation, aortic root dilated 4.7 cm, ascending aorta dilated 4.3 cm. Mild MR and AR, mod/severe WV. He denies any abnormal bleeding or bruising.        Patient Active Problem List   Diagnosis    Atrial fibrillation with rapid ventricular response (HCC)    Dyspnea  Acute congestive heart failure (HCC)    Coronary artery disease    Dilatation of aorta (HCC)    ETOH abuse    Ischemic cardiomyopathy    Ventricular tachyarrhythmia (HCC)    Mixed hyperlipidemia    Longstanding persistent atrial fibrillation (HCC)    Edema         Past Medical History:   has a past medical history of Atrial fibrillation ([address] Cardiomyopathy ([address] and Peripheral neuropathy. Surgical History:   has no past surgical history on file. Social History:   reports that he has never smoked. He has never used smokeless tobacco. He reports current alcohol use. He reports that he does not use drugs. Family History:  No evidence for sudden cardiac death or premature CAD    Home Medications:  Reviewed and are listed in nursing record. and/or listed below  Current Outpatient Medications   Medication Sig Dispense Refill    apixaban (ELIQUIS) 5 MG TABS tablet Take by mouth 2 times daily      gabapentin (NEURONTIN) 100 MG capsule Take 1 capsule by mouth 3 times daily for 30 days. 90 capsule 0    rosuvastatin (CRESTOR) 10 MG tablet Take 1 tablet by mouth nightly 30 tablet 0    carvedilol (COREG) 6.25 MG tablet Take 1 tablet by mouth 2 times daily (with meals) 60 tablet 0    tamsulosin (FLOMAX) 0.4 MG capsule Take 1 capsule by mouth daily 30 capsule 0    sacubitril-valsartan (ENTRESTO) 24-26 MG per tablet Take 1 tablet by mouth 2 times daily (Patient not taking: Reported on 12/15/2020) 60 tablet 5    furosemide (LASIX) 20 MG tablet Take 1 tablet by mouth daily (Patient not taking: Reported on 12/15/2020) 30 tablet 5     No current facility-administered medications for this visit. Allergies:  Patient has no known allergies. Review of Systems:   A 14 point review of symptoms completed. Pertinent positives identified in the HPI, all other review of symptoms negative as below.     Objective:   PHYSICAL EXAM:    Vitals:    12/15/20 0852   BP: 112/68   Pulse: 71   SpO2: 98% Weight: 237 lb (107.5 kg)     Wt Readings from Last 3 Encounters:   12/15/20 237 lb (107.5 kg)   09/25/20 234 lb (106.1 kg)   06/16/20 232 lb 12.8 oz (105.6 kg)         General Appearance:  Alert, cooperative, no distress, appears stated age   Head:  Normocephalic, atraumatic   Eyes:  PERRL, conjunctiva/corneas clear   Nose: Nares normal, no drainage or sinus tenderness   Throat: Lips, mucosa, and tongue normal   Neck: Supple, symmetrical, trachea midline, NL thyroid no carotid bruit or JVD   Lungs:   CTAB, respirations unlabored   Chest Wall:  No tenderness or deformity   Heart:  irregualr rhythm and normal rate; S1, S2 are normal;   no murmur noted; no rub or gallop   Abdomen:   Soft, non-tender, +BS x 4, no masses, no organomegaly   Extremities: Extremities normal, atraumatic, no cyanosis 1+ BLEpitting edema at ankles   Pulses: 2+ and symmetric   Skin: Skin color, texture, turgor normal, no rashes or lesions   Pysch: Normal mood and affect   Neurologic: Normal gross motor and sensory exam.         LABS   CBC:      Lab Results   Component Value Date    WBC 6.1 06/16/2020    RBC 3.94 06/16/2020    HGB 13.1 06/16/2020    HCT 37.9 06/16/2020    MCV 96.4 06/16/2020    RDW 13.1 06/16/2020     06/16/2020     CMP:  Lab Results   Component Value Date     06/16/2020    K 4.3 06/16/2020    K 3.9 06/12/2020     06/16/2020    CO2 22 06/16/2020    BUN 17 06/16/2020    CREATININE 1.1 06/16/2020    GFRAA >60 06/16/2020    AGRATIO 1.3 06/04/2020    LABGLOM >60 06/16/2020    GLUCOSE 100 06/16/2020    PROT 6.4 06/06/2020    CALCIUM 8.9 06/16/2020    BILITOT 1.3 06/04/2020    ALKPHOS 51 06/04/2020    AST 24 06/04/2020    ALT 18 06/04/2020     PT/INR:   No results found for: PTINR  Liver:  No components found for: CHLPL  Lab Results   Component Value Date    ALT 18 06/04/2020    AST 24 06/04/2020    ALKPHOS 51 06/04/2020    BILITOT 1.3 (H) 06/04/2020     No results found for: LABA1C  Lipids:         Lab Results Component Value Date    TRIG 75 06/10/2020    TRIG 137 2020            Lab Results   Component Value Date    HDL 24 (L) 06/10/2020    HDL 34 (L) 2020    HDL 35 (L) 2020            Lab Results   Component Value Date    LDLCALC 68 06/10/2020    LDLCALC 139 (H) 2020    LDLCALC 137 (H) 2020            Lab Results   Component Value Date    LABVLDL 15 06/10/2020    LABVLDL 27 2020    LABVLDL 20 2020         CARDIAC DATA   EK/10/2020:   Atrial fibrillation HR 71    ECHO 10/1/2020  Summary   Left ventricular systolic function is moderately reduced with a visually   estimated ejection fraction of 35-40 %. EF estimated by Tatum's method at 38 %. Upper limit of normal wall thickness. Moderate global hypokinesis with regional variation. Grade I diastolic dysfunction with normal filling pressure. There is no evidence of a left ventricular thrombus. Severe bi-atrial dilation. The right ventricle is mildly dilated. Right ventricular systolic function is reduced. The aortic root is moderately dilated at 4.7 cm. The ascending aorta is dilated at 4.3 cm. Posterior mitral annular calcification is present. Aortic valve appears sclerotic but opens adequately. Mild mitral and aortic regurgitation. Moderate eccentric tricuspid regurgitation. Moderate to severe pulmonic regurgitation. Systolic pulmonary artery pressure (SPAP) is normal and estimated at 28 mmHg   (right atrial pressure 3 mmHg). Compared to last echo on 20 (EF 20%), left ventricle systolic function   has improved. ECHO 2020 (limited)  Summary   Limited study for impella placement. Impella located at 3.3 cm. [address] [place] [address]: 2020  No comparison   The left ventricular systolic function is severely reduced with an ejection fraction of < 20 %. Definity contrast administered with no evidence of left ventricular mass or thrombus noted. Severe global hypokinesis, . Left ventricular diastolic filling pressure are indeterminate. The right ventricle is normal in size with decreased function. Severe bi-atrial enlargement. Mild ( eccentric) aortic regurgitation. Moderate to severe pulmonic regurgitation. MIld mitral regurgitation. Mild to moderate tricuspid regurgitation. The ascending aorta is dilated (6.24MI)  Systolic pulmonary artery pressure (SPAP) is normal and estimated at 38 mmHg (right atrial pressure 15 mmHg). IVC is dilated (> 2.1 cm) and collapses < 50% with respiration consistent with markedly elevated right atrial pressure (15 mmHg). STRESS TEST:     CARDIAC CATH:  LEFT HEART CATH 6/10/2020  Large vessels  LM: luminals  LAD: mid 99% at bifurcation with very large branching septal and small diag. Distal LAD is very small and likely under filled and not able to appreciate                Very faint R-L and L-L  LCX: mid 30-40%                OM1- large vessel mid 30%     LESION 1: mid LAD  Stent: Xience Rosy 3 x 33mm  Post dilated to 4.5mm prox and 3mm distally     Assessment  1. Successful PCI to mid LAD, 0% residual, TIMi3 flow. Distal ALD improved TIMi3 flow  2.  COnt Integrillin for 2 hr and Cangrelor for 2hrs AFTER ticagrelor dose  3. ASA 81mg poqday and ticagrelor 90mg po BID  4. HOLD BB for now, leave impella in for Hypotension (not able to wean impella)                - rest overnight and attempt re wean      CARDIAC CATH:  LEFT HEART CATH 6/8/2020  Large vessels  LM: luminals  LAD: mid 99% at bifurcation with very large branching septal and small diag.  Distal LAD is very small and likely under filled and not able to appreciate                Very faint R-L and L-L  LCX: mid 30-40%                OM1- large vessel mid 30%  RCA: Dominant, prox shelf like 40% lesions, mid 40%                High PDA/PLV bifurcation     LVEDP:8  LVEF: 20% severe global  RIGHT HEART CATH  RA: 8  RV: 30/8  PA:  30/10     and mean of 17 PCWP:  12     Sats: on RA  Ao:  91%  RA: 68%  PA: 68%     CO/CI (Zheng) 6.84/2.87  SVR//47  Assessment  1. Critical mid LAD 95% with Shayan-2 flow and poor distal targets  2. Severe mixed ischemic and nonischemic Cardiomyopathy: LVEF <20%                - LVEF and echo out of proportion to CAD                - Very complex mid LAD lesions                - need to talk with pt and Ct surgery as very high risk PCI                - will consider cangrelor in case case needs to go to OR                - Need Impella 3.2 at minimum but if pt decompensates, would need IMpella 5 or ECMO backup  3. Start heparin ggt 6 hrs after sheath removal  5. Bb, statin, ACE ARB as tolerated       VASCULAR/OTHER IMAGING:      Assessment and Plan   Chris Leon is a 79 y.o. male who presents today for the following problems:      Assessment:  1. Atrial fibrillation: remains in afib, rate controlled               - [id number] Score for Atrial Fibrillation Stroke Risk 2              - s/p failed mini-maze and previous ablation  2. Chronic systolic congestive heart failure:              - weights are stable at 234-->237lbs pounds by scale today  3. Ischemic Cardiomyopathy:  Recovering. LVEF now 35 to 40%   - EF <20 % per echo 6/6, previous EF 35-40% 2007  4. CAD              - 6/10/2020 PCi to mid LAD under impella support  5. MVP: on echo 2007  6. HLD: Well controlled but low protective HDL  7. Noncompliance with medication and follow up: Significantly improved  8. Dilated ascending aorta: 4.32 cm--> 4.7cm  10. Alcohol abuse: he stated usually 1 [place] whisky drink a day, cessation counseled   11. VT: None recently     Plan:   1. He continues to do well. With no signs of decompensated heart failure or angina  2. We will switch ticagrelor to Plavix secondary to cost.   -Patient is now on Medicare and try negotiate the system  3. We will get echocardiogram in 6 months for ascending aorta. 4.  Continue to use Lasix daily as needed for edema and weight gain  5. Continue Entresto, samples given, push through PA  6. Continue Coreg, Crestor   -We will hold aspirin to prevent triple therapy  7. Avoid all EToH       Patient Active Problem List   Diagnosis    Atrial fibrillation with rapid ventricular response (HCC)    Dyspnea    Acute congestive heart failure (HCC)    Coronary artery disease    Dilatation of aorta (HCC)    ETOH abuse    Ischemic cardiomyopathy    Ventricular tachyarrhythmia ([address]    Mixed hyperlipidemia    Longstanding persistent atrial fibrillation ([address]    Edema       Patient Plan:  1. New prescription for Plavix (clopidrogel) 75 mg daily  2. Continue all other medications as prescribed  3. Echocardiogram to view size and strength of the heart in 6 months   4. Follow up in 1 year       It is a pleasure to assist in the care of Pedro Alas. Please call with any questions. This note was scribed in the presence of Jorge Alberto Andrade MD by Yi Cortes RN.      Tamia Lucero MD, personally performed the services described in this documentation as scribed by the above signed scribe in my presence, and it is both accurate and complete to the best of our ability and knowledge. I agree with the details independently gathered by my clinical support staff, while the remaining scribed note accurately describes my personal service to the patient. The above RN is working as a scribe for and in the presence of myself . Working as a scribe, the RN may have prepopulated components of this note with my historical intellectual property under my direct supervision. Any additions to this intellectual property were performed at my direction. Furthermore, the content and accuracy of this note have been reviewed by me to the best of my ability.    *          Luz Lake MD, [address] [facility] Cardiologist  [facility]  [phone] [facility] [phone] [address] [place] [address]

## 2020-12-17 ENCOUNTER — TELEPHONE (OUTPATIENT)
Dept: CARDIOLOGY CLINIC | Age: 70
End: 2020-12-17

## 2020-12-17 NOTE — TELEPHONE ENCOUNTER
Patient will need help with Eliquis 5 mg BID. Will need samples to help until the end of the year. Has had insurance issues and knows this medication is important. Explained to patient I am not at the [facility] office today and would check once I get back. Voiced understanding.

## 2021-02-22 RX ORDER — FUROSEMIDE 20 MG/1
20 TABLET ORAL DAILY
Qty: 90 TABLET | Refills: 3 | Status: SHIPPED | OUTPATIENT
Start: 2021-02-22 | End: 2021-09-20

## 2021-07-01 ENCOUNTER — HOSPITAL ENCOUNTER (OUTPATIENT)
Dept: CARDIOLOGY | Age: 71
Discharge: HOME | End: 2021-07-01
Payer: MEDICARE

## 2021-07-01 DIAGNOSIS — I25.5 ISCHEMIC CARDIOMYOPATHY: ICD-10-CM

## 2021-07-01 LAB
LV EF: 33 %
LVEF MODALITY: NORMAL

## 2021-07-01 PROCEDURE — 93306 TTE W/DOPPLER COMPLETE: CPT

## 2021-07-09 ENCOUNTER — TELEPHONE (OUTPATIENT)
Dept: CARDIOLOGY CLINIC | Age: 71
End: 2021-07-09

## 2021-07-09 NOTE — TELEPHONE ENCOUNTER
----- Message from Saige Pope MD sent at 7/1/2021  5:55 PM EDT -----  Let patient know that his heart function has gotten slightly worse compared to previous. We were doing better and I am concerned that he may be drinking again and the alcohol is leading to the decline.   Please make sure he is taking all his medicines and not drinking I would follow-up in office as usual

## 2021-07-09 NOTE — LETTER
[address] Cardiology - [address]  Phone: [phone]  Fax: [phone]    Taras Arthur MD        July 12, 2021    [address] [place] [address]      Dear Trigence:    Please call office regarding results. If you have any questions or concerns, please don't hesitate to call.     Sincerely,        Taras Arthur MD

## 2021-07-15 ENCOUNTER — TELEPHONE (OUTPATIENT)
Dept: CARDIOLOGY CLINIC | Age: 71
End: 2021-07-15

## 2021-09-20 RX ORDER — FUROSEMIDE 20 MG/1
20 TABLET ORAL DAILY
Qty: 30 TABLET | Refills: 11 | Status: SHIPPED | OUTPATIENT
Start: 2021-09-20 | End: 2022-04-15 | Stop reason: SDUPTHER

## 2022-04-14 DIAGNOSIS — I50.41 ACUTE COMBINED SYSTOLIC AND DIASTOLIC CONGESTIVE HEART FAILURE (HCC): Primary | ICD-10-CM

## 2022-04-14 DIAGNOSIS — I48.91 ATRIAL FIBRILLATION WITH RAPID VENTRICULAR RESPONSE (HCC): ICD-10-CM

## 2022-04-14 NOTE — TELEPHONE ENCOUNTER
Pt scheduled for 6/7 with jjp for a routine ov and med refill. Pt would like to know if he needs an echo prior to his ov. Pt would also like to know if we have samples of entresto 24-26mg. Pt stated that he needs refills of lasik 20mg and plavix 75mg to be sent to [facility] on [address]. [phone]. Last ov 12/15/2020 jjp. Upcoming ov 6/7/22 jjp.

## 2022-04-15 RX ORDER — FUROSEMIDE 20 MG/1
20 TABLET ORAL DAILY
Qty: 90 TABLET | Refills: 3 | Status: SHIPPED | OUTPATIENT
Start: 2022-04-15

## 2022-04-15 RX ORDER — CLOPIDOGREL BISULFATE 75 MG/1
75 TABLET ORAL DAILY
Qty: 90 TABLET | Refills: 3 | Status: SHIPPED | OUTPATIENT
Start: 2022-04-15

## 2022-04-15 NOTE — TELEPHONE ENCOUNTER
Does pt need another echo?   Last OV JJP 12/15/2020  Next OV JJP 6/7/2022  Last Echo 7/1/2021    Rx refills pending sign off    Informed pt entresto samples are ready for

## 2022-05-09 ENCOUNTER — TELEPHONE (OUTPATIENT)
Dept: CARDIOLOGY CLINIC | Age: 72
End: 2022-05-09

## 2022-05-09 DIAGNOSIS — I71.21 ASCENDING AORTIC ANEURYSM: Primary | ICD-10-CM

## 2022-05-09 NOTE — TELEPHONE ENCOUNTER
Pt called I and was wondering if he needs ECHO before his appt charles/ REGINA on 06/07/2022. Please advise.

## 2022-05-09 NOTE — TELEPHONE ENCOUNTER
Yes please tell patient I would like to get other echo to assess his EF and asending aortic aneurysm please order

## 2022-05-09 NOTE — TELEPHONE ENCOUNTER
Does pt need an echo ordered before next OV?     Last OV JJP 12/15/2020 says echo in 6 months for ascending aorta  Last Echo 7/1/2021

## 2022-05-10 NOTE — TELEPHONE ENCOUNTER
Pt voiced understanding to get echo before next ov. Your provider has ordered testing for further evaluation. An order/prescription has been included in your paper work.  To schedule outpatient testing, contact Central Scheduling by calling [address] [place] [address] ([phone]).

## 2022-05-18 ENCOUNTER — HOSPITAL ENCOUNTER (OUTPATIENT)
Dept: CARDIOLOGY | Age: 72
Discharge: HOME | End: 2022-05-18
Payer: MEDICARE

## 2022-05-18 DIAGNOSIS — I71.21 ASCENDING AORTIC ANEURYSM: ICD-10-CM

## 2022-05-18 LAB
LV EF: 38 %
LVEF MODALITY: NORMAL

## 2022-05-18 PROCEDURE — 93306 TTE W/DOPPLER COMPLETE: CPT

## 2022-05-19 ENCOUNTER — TELEPHONE (OUTPATIENT)
Dept: CARDIOLOGY CLINIC | Age: 72
End: 2022-05-19

## 2022-05-19 NOTE — TELEPHONE ENCOUNTER
----- Message from Marga Ruano MD sent at 5/18/2022  4:55 PM EDT -----  Let patient know that his heart function remains reduced at about 35% and is unchanged. His ascending aorta does appear to be slightly more dilated compared to previous echo last year. I would suggest repeating an echo in 6 months to reevaluate for any interval expansion. Limited echo for a sending aorta is okay.   It is important for patient to follow his blood pressure and call if greater than 130/80

## 2022-06-03 NOTE — PROGRESS NOTES
[address] [facility]   Cardiovascular Evaluation    PATIENT: Rylan Coello  DATE: 2022  MRN: [id number]  CSN: [id number]  : 1950      Primary Care Doctor: Daphne Buenrostro MD  Reason for evaluation:   1 Year Follow Up, Congestive Heart Failure, Coronary Artery Disease, Atrial Fibrillation, Cardiomyopathy, and Hyperlipidemia      Subjective:   History of present illness on initial date of evaluation:   Rylan Coello is a 70 y.o. patient who presents for follow up. He has a past medical history of Afib, CAD, CHF, HLD< Dilated ascending aorta. He was admitted on 2020 with SOB. EKG showed afib with a HR of 130. Echo on 2020 showed an EF of less than 20% and moderate to severe pulmonic regurgitation. On 2020 he underwent a LHC/RHC demonstrated critical mid LAD, severe mixed ischemic/nonischemic CMP. On 6/10/2020 he had a LHC and PCI of LAD with Impella assist (removed ). EP consulted for LifeVest evaluation. (Of note: underwent a Mcdonald mini maze procedure in  for treatment of atrial fibrillation)  OV 20 he reported feeling ok. He denied any chest pain. OV 12/15/2020, He reports he only has medicare now and was unable to continue to afford the [employer]. Echo 10/1/20 demonstrated EF 35-40%, Grade 1 DD, severe bi-atrial dilation, aortic root dilated 4.7 cm, ascending aorta dilated 4.3 cm. Mild MR and AR, mod/severe HI. Today he stats he feels good. Sometimes he gets shortness of breath when walking up the stairs. He has neuropathy in his feet that he takes medications for to manage. Patient is taking all cardiac medications as prescribed and tolerates them well. Patient denies current edema, chest pain, sob, palpitations, dizziness or syncope.         Patient Active Problem List   Diagnosis    Atrial fibrillation with rapid ventricular response (HCC)    Dyspnea    Acute congestive heart failure ([address]    Coronary artery disease    Dilatation of aorta ([address]  ETOH abuse    Ischemic cardiomyopathy    Ventricular tachyarrhythmia (HCC)    Mixed hyperlipidemia    Longstanding persistent atrial fibrillation (HCC)    Edema         Past Medical History:   has a past medical history of Atrial fibrillation ([address] Cardiomyopathy ([address] and Peripheral neuropathy. Surgical History:   has no past surgical history on file. Social History:   reports that he has never smoked. He has never used smokeless tobacco. He reports previous alcohol use. He reports that he does not use drugs. Family History:  No evidence for sudden cardiac death or premature CAD    Home Medications:  Reviewed and are listed in nursing record. and/or listed below  Current Outpatient Medications   Medication Sig Dispense Refill    atorvastatin (LIPITOR) 20 MG tablet Take 20 mg by mouth daily      temazepam (RESTORIL) 30 MG capsule Take 30 mg by mouth at bedtime.  clopidogrel (PLAVIX) 75 MG tablet Take 1 tablet by mouth daily 90 tablet 3    furosemide (LASIX) 20 MG tablet Take 1 tablet by mouth daily 90 tablet 3    sacubitril-valsartan (ENTRESTO) 24-26 MG per tablet Take 1 tablet by mouth 2 times daily 60 tablet 5    apixaban (ELIQUIS) 5 MG TABS tablet Take by mouth 2 times daily      gabapentin (NEURONTIN) 100 MG capsule Take 1 capsule by mouth 3 times daily for 30 days. (Patient taking differently: Take 300 mg by mouth 2 times daily. ) 90 capsule 0    carvedilol (COREG) 6.25 MG tablet Take 1 tablet by mouth 2 times daily (with meals) 60 tablet 0    rosuvastatin (CRESTOR) 10 MG tablet Take 1 tablet by mouth nightly (Patient not taking: Reported on 6/7/2022) 30 tablet 0    tamsulosin (FLOMAX) 0.4 MG capsule Take 1 capsule by mouth daily (Patient not taking: Reported on 6/7/2022) 30 capsule 0     No current facility-administered medications for this visit. Allergies:  Patient has no known allergies. Review of Systems:   A 14 point review of symptoms completed.  Pertinent and L-L  LCX: mid 30-40%                OM1- large vessel mid 30%     LESION 1: mid LAD  Stent: Xience Rosy 3 x 33mm  Post dilated to 4.5mm prox and 3mm distally      RIGHT HEART CATH  RA: 8  RV: 30/8  PA:  30/10     and mean of 17  PCWP:  12     Sats: on RA  Ao:  91%  RA: 68%  PA: 68%     CO/CI (Zheng) 6.84/2.87  SVR//47       VASCULAR/OTHER IMAGING:    VL KELSEA 2/23/2022  Impression:     o Normal bilateral lower extremity KELSEA(s). Assessment and Plan   Chris Leon is a 70 y.o. male who presents today for the following problems:      Assessment:  1. Atrial fibrillation: remains in afib, rate controlled               - [id number] Score for Atrial Fibrillation Stroke Risk 2              - s/p failed mini-maze and previous ablation  2. Chronic systolic congestive heart failure:              - weights are stable at 234-->237-->226lbs pounds by scale today  3. Ischemic Cardiomyopathy:  Recovering. LVEF now 35 to 40%   - EF <20 % per echo 6/6, previous EF 35-40% 2007  4. CAD              - 6/10/2020 PCi to mid LAD under impella support  5. MVP: on echo 2007  6. HLD: worsening  7. Noncompliance with medication and follow up: Significantly improved  8. Dilated ascending aorta: 4.32 cm--> 4.48cm  10. Alcohol abuse: he stated usually 1 Mongolian whisky drink a day, cessation counseled   11. VT: None recently       Plan:   1. Continues to do well his heart function is slowly recovering from LAD lesion. Never with any significant heart failure or symptoms. 2.  Somewhat dynamic ascending aortic aneurysm and now close to 4.5 cm.   -Every 6 month echoes at this point to follow closely. No significant AR  3. Increase Lipitor to 40 mg p.o. nightly. Lipids 3 months  4. Switched ticagrelor to Plavix secondary to cost.  5.  Continue to use Lasix daily as needed for edema and weight gain  6. Continue Coreg, Crestor, entresto   -We will hold aspirin to prevent triple therapy  7.  Avoid all EToH       Patient Active Problem List   Diagnosis    Atrial fibrillation with rapid ventricular response (HCC)    Dyspnea    Acute congestive heart failure (HCC)    Coronary artery disease    Dilatation of [facility]    ETOH abuse    Ischemic cardiomyopathy    Ventricular tachyarrhythmia ([facility]    Mixed hyperlipidemia    Longstanding persistent atrial fibrillation ([facility]    Edema       Patient Plan:  1. Echocardiogram in November 2022 to assess heart function and aorta   - I will call you with results  2. Start Lipitor 40 mg nightly for cholesterol management   3. Recommend a mediterranean diet (low salt, avoid red meat, avoid fatty or fried foods, lots of fruits and vegetables) as well as regular moderate intensity activity for 30 minutes per day 3-5 times per week. - I do not recommend vigorous exercise or weight lifting due to your dilated aorta  4. Follow up with your primary care regarding neuropathy in feet  5. Fasting lipids in 6 months  6. Follow up in 1 year      This note was scribed in the presence of Kem Buck MD by Andre Brown RN.        Rogelio He MD, personally performed the services described in this documentation as scribed by the above signed scribe in my presence, and it is both accurate and complete to the best of our ability and knowledge. I agree with the details independently gathered by my clinical support staff, while the remaining scribed note accurately describes my personal service to the patient. The above RN is working as a scribe for and in the presence of myself . Working as a scribe, the RN may have prepopulated components of this note with my historical intellectual property under my direct supervision. Any additions to this intellectual property were performed at my direction. Furthermore, the content and accuracy of this note have been reviewed by me to the best of my ability.

## 2022-06-07 ENCOUNTER — OFFICE VISIT (OUTPATIENT)
Dept: CARDIOLOGY CLINIC | Age: 72
End: 2022-06-07
Payer: MEDICARE

## 2022-06-07 VITALS
WEIGHT: 226 LBS | OXYGEN SATURATION: 98 % | HEIGHT: 76 IN | SYSTOLIC BLOOD PRESSURE: 116 MMHG | BODY MASS INDEX: 27.52 KG/M2 | HEART RATE: 107 BPM | DIASTOLIC BLOOD PRESSURE: 72 MMHG

## 2022-06-07 DIAGNOSIS — I71.21 ASCENDING AORTIC ANEURYSM: ICD-10-CM

## 2022-06-07 DIAGNOSIS — I25.5 ISCHEMIC CARDIOMYOPATHY: ICD-10-CM

## 2022-06-07 DIAGNOSIS — E78.2 MIXED HYPERLIPIDEMIA: ICD-10-CM

## 2022-06-07 DIAGNOSIS — R06.00 DYSPNEA, UNSPECIFIED TYPE: ICD-10-CM

## 2022-06-07 DIAGNOSIS — I25.118 CORONARY ARTERY DISEASE INVOLVING NATIVE CORONARY ARTERY OF NATIVE HEART WITH OTHER FORM OF ANGINA PECTORIS (HCC): Primary | ICD-10-CM

## 2022-06-07 DIAGNOSIS — I48.91 ATRIAL FIBRILLATION WITH RAPID VENTRICULAR RESPONSE (HCC): ICD-10-CM

## 2022-06-07 DIAGNOSIS — I77.819 DILATATION OF AORTA (HCC): ICD-10-CM

## 2022-06-07 DIAGNOSIS — I48.0 PAF (PAROXYSMAL ATRIAL FIBRILLATION) (HCC): ICD-10-CM

## 2022-06-07 PROCEDURE — 1123F ACP DISCUSS/DSCN MKR DOCD: CPT | Performed by: INTERNAL MEDICINE

## 2022-06-07 PROCEDURE — 3017F COLORECTAL CA SCREEN DOC REV: CPT | Performed by: INTERNAL MEDICINE

## 2022-06-07 PROCEDURE — G8417 CALC BMI ABV UP PARAM F/U: HCPCS | Performed by: INTERNAL MEDICINE

## 2022-06-07 PROCEDURE — 1036F TOBACCO NON-USER: CPT | Performed by: INTERNAL MEDICINE

## 2022-06-07 PROCEDURE — G8427 DOCREV CUR MEDS BY ELIG CLIN: HCPCS | Performed by: INTERNAL MEDICINE

## 2022-06-07 PROCEDURE — 99214 OFFICE O/P EST MOD 30 MIN: CPT | Performed by: INTERNAL MEDICINE

## 2022-06-07 RX ORDER — ATORVASTATIN CALCIUM 40 MG/1
40 TABLET, FILM COATED ORAL DAILY
Qty: 90 TABLET | Refills: 3 | Status: SHIPPED | OUTPATIENT
Start: 2022-06-07

## 2022-06-07 RX ORDER — ATORVASTATIN CALCIUM 20 MG/1
20 TABLET, FILM COATED ORAL DAILY
COMMUNITY
Start: 2021-05-17 | End: 2022-06-07

## 2022-06-07 RX ORDER — TEMAZEPAM 30 MG/1
30 CAPSULE ORAL NIGHTLY
COMMUNITY
Start: 2022-06-06 | End: 2022-07-06

## 2022-06-07 NOTE — PATIENT INSTRUCTIONS
Your provider has ordered testing for further evaluation. An order/prescription has been included in your paper work.  To schedule outpatient testing, contact Central Scheduling by calling [phone] ([phone]). Patient Plan:  1. Echocardiogram in November 2022 to assess heart function and aorta   - I will call you with results  2. Start Lipitor 40 mg nightly for cholesterol management   3. Recommend a mediterranean diet (low salt, avoid red meat, avoid fatty or fried foods, lots of fruits and vegetables) as well as regular moderate intensity activity for 30 minutes per day 3-5 times per week. - I do not recommend vigorous exercise or weight lifting due to your dilated aorta  4. Follow up with your primary care regarding neuropathy in feet  5. Fasting lipids in 3 months  6.  Follow up in 1 year

## 2022-08-03 ENCOUNTER — TELEPHONE (OUTPATIENT)
Dept: CARDIOLOGY CLINIC | Age: 72
End: 2022-08-03

## 2022-08-03 NOTE — TELEPHONE ENCOUNTER
Pt called mhi and stated he wants to take potassium due pts who take lasix are more often supposed to take potassium, pt wondering if he he can get potassium level taken? ? Please advise.

## 2022-08-03 NOTE — TELEPHONE ENCOUNTER
Please let patient know that while his, it is true that many patients with Lasix do take potassium supplementation, his potassium has always been normal if not slightly elevated. This suggest he is getting a good amount of potassium from dietary sources and I am hesitant to add potassium as it can cause lethal cardiac arrhythmias. We will hold off on prescribing potassium supplement at this time however if he is more concerned more than willing to follow potassium levels he can get a BNP and a BMP in 1 to 2 weeks to see if his potassium levels are dropping requiring supplementation.   Thanks

## 2022-08-03 NOTE — TELEPHONE ENCOUNTER
Pt called back and is requesting to have a script for Potassium-CL 20meq. He stated he does not take his lasix everyday, but is taking more often than what he was in the past when originally prescribed. Please contact pt with response from Maddie Nagy about the pt's request to add the potassium script to his medication regime.

## 2022-08-03 NOTE — TELEPHONE ENCOUNTER
Attempted to call the patient. No answer and no voicemail set up. Potassium and Lasix are okay to take together but who recommended he start taking potassium? There are no recent potassium levels. Would not recommend taking potassium until we know if the MD wants a potassium level drawn prior.

## 2022-08-03 NOTE — TELEPHONE ENCOUNTER
Please advise if pt should start taking potassium.  CMP checked at [facility] 2/14/2022- Potassium 4.3    Last OV JJP 6/7/2022

## 2022-08-04 ENCOUNTER — TELEPHONE (OUTPATIENT)
Dept: CARDIOLOGY CLINIC | Age: 72
End: 2022-08-04

## 2022-08-04 NOTE — TELEPHONE ENCOUNTER
Pt returned call. Relayed JJP message regarding potassium supplement and if pt would need it, per Trev Orta pt does not need potassium supplement at this time to due stable lab values of potassium. Pt verbalized understanding of message given, stated he trusts Dr. Gregoroi Daniel advice in regards to pt not needing a potassium supplement while he is taking diuretic.

## 2022-08-04 NOTE — TELEPHONE ENCOUNTER
Pt returned call. Relayed P message regarding potassium supplement and if pt would need it. Pt verbalized understanding of message given, stated he trusts Dr. Marleny Moreira advice in regards to pt not needing a potassium supplement while he is taking diuretic.

## 2022-11-14 DIAGNOSIS — I50.41 ACUTE COMBINED SYSTOLIC AND DIASTOLIC CONGESTIVE HEART FAILURE (HCC): ICD-10-CM

## 2022-11-15 DIAGNOSIS — I50.41 ACUTE COMBINED SYSTOLIC AND DIASTOLIC CONGESTIVE HEART FAILURE (HCC): Primary | ICD-10-CM

## 2022-11-15 RX ORDER — SACUBITRIL AND VALSARTAN 24; 26 MG/1; MG/1
1 TABLET, FILM COATED ORAL 2 TIMES DAILY
Qty: 180 TABLET | Refills: 3 | Status: SHIPPED | OUTPATIENT
Start: 2022-11-15 | End: 2022-11-25 | Stop reason: SDUPTHER

## 2022-11-15 RX ORDER — FUROSEMIDE 20 MG/1
20 TABLET ORAL DAILY
Qty: 90 TABLET | Refills: 3 | Status: SHIPPED | OUTPATIENT
Start: 2022-11-15

## 2022-11-15 NOTE — TELEPHONE ENCOUNTER
.  Medication Refill    Medication needing refilled:sacubitril-valsartan (ENTRESTO) 24-26 MG per tablet       Dosage of the medication: 24-26 mg     How are you taking this medication (QD, BID, TID, QID, PRN):   Take 1 tablet by mouth 2 times daily             30 or 90 day supply called in: 60    When will you run out of your medication: now     Which Pharmacy are we sending the medication to?:    [facility] [address] [place] [address], [address] [place] [address] [facility]  [facility], [address] [place] [id number]   Phone:  [phone]  Fax:  [phone]

## 2022-11-16 ENCOUNTER — TELEPHONE (OUTPATIENT)
Dept: CARDIOLOGY CLINIC | Age: 72
End: 2022-11-16

## 2022-11-16 DIAGNOSIS — I50.41 ACUTE COMBINED SYSTOLIC AND DIASTOLIC CONGESTIVE HEART FAILURE (HCC): ICD-10-CM

## 2022-11-16 NOTE — TELEPHONE ENCOUNTER
11/16 Pt presented himself in office today to  samples. He is aware that Jessee Gabriel is leaving [facility] and would like to see if he can schedule appt with him before he leaves. Pt would also like for JFERCHO to put in an order for an ECHO to be performed before he sees Jessee Gabriel. Please advise.

## 2022-11-16 NOTE — TELEPHONE ENCOUNTER
Pt stated that he went to  his refill of Entresto and stated that it was very pricey. Pt would like to know if we have any samples of Entresto? Pt is out of medication.

## 2022-11-17 NOTE — TELEPHONE ENCOUNTER
LVM for pt to return call. Echo already ordered. Per Shannon Garza last OV note pt to f/u in  1 year. ..     Last OV JFERCHO 6/7/2022

## 2022-11-21 RX ORDER — SACUBITRIL AND VALSARTAN 24; 26 MG/1; MG/1
1 TABLET, FILM COATED ORAL 2 TIMES DAILY
Qty: 180 TABLET | Refills: 3 | Status: CANCELLED | COMMUNITY
Start: 2022-11-21

## 2022-11-21 NOTE — TELEPHONE ENCOUNTER
LMOM for pt letting him know samples are ready for  at the [facility] office. We are here [place]- Wed this week 8/5. Pt to call with concerns.

## 2022-11-25 RX ORDER — SACUBITRIL AND VALSARTAN 24; 26 MG/1; MG/1
1 TABLET, FILM COATED ORAL 2 TIMES DAILY
Qty: 60 TABLET | Refills: 0 | COMMUNITY
Start: 2022-11-25

## 2022-12-22 ENCOUNTER — HOSPITAL ENCOUNTER (OUTPATIENT)
Dept: CARDIOLOGY | Age: 72
Discharge: HOME | End: 2022-12-22
Payer: MEDICARE

## 2022-12-22 DIAGNOSIS — I25.118 CORONARY ARTERY DISEASE INVOLVING NATIVE CORONARY ARTERY OF NATIVE HEART WITH OTHER FORM OF ANGINA PECTORIS (HCC): ICD-10-CM

## 2022-12-22 DIAGNOSIS — I77.819 DILATATION OF AORTA (HCC): ICD-10-CM

## 2022-12-22 DIAGNOSIS — R06.00 DYSPNEA, UNSPECIFIED TYPE: ICD-10-CM

## 2022-12-22 PROCEDURE — 93306 TTE W/DOPPLER COMPLETE: CPT

## 2022-12-27 ENCOUNTER — TELEPHONE (OUTPATIENT)
Dept: CARDIOLOGY CLINIC | Age: 72
End: 2022-12-27

## 2022-12-27 NOTE — TELEPHONE ENCOUNTER
Pt called into office. Relayed results to pt, pt v/u. Pt stated he plans on following JJP to  and is wondering should he still come to [facility] for EP or is there someone he could see at [facility] regarding this since that's where he will be seeing Tr Gonzales. Please advise.

## 2022-12-27 NOTE — TELEPHONE ENCOUNTER
----- Message from Cristi Claudio MD sent at 12/27/2022  8:49 AM EST -----  Let patient know their echo test shows stable findings, however, pt may benefit from device therapy, rec: referral to EP to consider that. Thanks.

## 2023-05-22 DIAGNOSIS — I48.91 ATRIAL FIBRILLATION WITH RAPID VENTRICULAR RESPONSE (HCC): ICD-10-CM

## 2023-05-22 DIAGNOSIS — I25.118 CORONARY ARTERY DISEASE INVOLVING NATIVE CORONARY ARTERY OF NATIVE HEART WITH OTHER FORM OF ANGINA PECTORIS (HCC): Primary | ICD-10-CM

## 2023-05-22 NOTE — TELEPHONE ENCOUNTER
Pt presented themself in office. Prior JJP Pt. Scheduled appt with FXW for 7/13/23.   At appt, will discuss Echo results but also needs refill on clopidogrel (PLAVIX) 75 MG tablet    [facility], [address] [place] [address]  [facility], [address] [place] [id number]   Phone:  [phone]  Fax:  [phone]

## 2023-05-23 RX ORDER — CLOPIDOGREL BISULFATE 75 MG/1
75 TABLET ORAL DAILY
Qty: 90 TABLET | Refills: 3 | Status: SHIPPED | OUTPATIENT
Start: 2023-05-23

## 2023-07-17 ENCOUNTER — OFFICE VISIT (OUTPATIENT)
Dept: CARDIOLOGY CLINIC | Age: 73
End: 2023-07-17
Payer: MEDICARE

## 2023-07-17 VITALS
BODY MASS INDEX: 28.92 KG/M2 | OXYGEN SATURATION: 96 % | WEIGHT: 237.5 LBS | HEIGHT: 76 IN | DIASTOLIC BLOOD PRESSURE: 70 MMHG | HEART RATE: 98 BPM | SYSTOLIC BLOOD PRESSURE: 126 MMHG

## 2023-07-17 DIAGNOSIS — I48.91 ATRIAL FIBRILLATION WITH RAPID VENTRICULAR RESPONSE (HCC): ICD-10-CM

## 2023-07-17 DIAGNOSIS — I77.810 ASCENDING AORTA DILATION (HCC): ICD-10-CM

## 2023-07-17 DIAGNOSIS — E78.2 MIXED HYPERLIPIDEMIA: ICD-10-CM

## 2023-07-17 DIAGNOSIS — I25.5 ISCHEMIC CARDIOMYOPATHY: ICD-10-CM

## 2023-07-17 DIAGNOSIS — I77.819 DILATATION OF AORTA (HCC): ICD-10-CM

## 2023-07-17 DIAGNOSIS — I50.41 ACUTE COMBINED SYSTOLIC AND DIASTOLIC CONGESTIVE HEART FAILURE (HCC): Primary | ICD-10-CM

## 2023-07-17 DIAGNOSIS — I25.118 CORONARY ARTERY DISEASE INVOLVING NATIVE CORONARY ARTERY OF NATIVE HEART WITH OTHER FORM OF ANGINA PECTORIS (HCC): ICD-10-CM

## 2023-07-17 PROCEDURE — 1036F TOBACCO NON-USER: CPT | Performed by: INTERNAL MEDICINE

## 2023-07-17 PROCEDURE — G8419 CALC BMI OUT NRM PARAM NOF/U: HCPCS | Performed by: INTERNAL MEDICINE

## 2023-07-17 PROCEDURE — 1123F ACP DISCUSS/DSCN MKR DOCD: CPT | Performed by: INTERNAL MEDICINE

## 2023-07-17 PROCEDURE — 93000 ELECTROCARDIOGRAM COMPLETE: CPT | Performed by: INTERNAL MEDICINE

## 2023-07-17 PROCEDURE — G8427 DOCREV CUR MEDS BY ELIG CLIN: HCPCS | Performed by: INTERNAL MEDICINE

## 2023-07-17 PROCEDURE — 3017F COLORECTAL CA SCREEN DOC REV: CPT | Performed by: INTERNAL MEDICINE

## 2023-07-17 PROCEDURE — 99214 OFFICE O/P EST MOD 30 MIN: CPT | Performed by: INTERNAL MEDICINE

## 2023-07-17 NOTE — PATIENT INSTRUCTIONS
Your provider has ordered testing for further evaluation. An order/prescription has been included in your paper work. To schedule outpatient testing, contact Central Scheduling by calling [address] [place] [address] ([phone]). PLAN:  CT Chest with contrast assess ascending aorta  Start aspirin 81 mg daily  Okay to stop plavix.  Therapy complete  Stop atorvastatin (lipitor)  Start leqvio for cholesterol management   The [facility] will reach out to you to start this proceed  Discussed the importance of taking eliquis 5 mg twice daily for stroke prevention given history of atrial fibrillation  Follow up in 1 year

## 2023-07-17 NOTE — PROGRESS NOTES
[facility] - Progress/Follow-up Note        REASON FOR FOLLOW UP  Cardiomyopathy, CAD, A-fib      INTERVAL HISTORY  Mr. Derrick Tafoya presents for follow up. HE previously followed with Dr. Cassandra Brown for Afib, HLD, CHF, cardiomyopathy, dilated ascending aorta, and CAD s/p PCI LAD 6/2020. He was admitted on 6/4/2020 with SOB. EKG showed afib with a HR of 130. Echo on 6/6/2020 showed an EF of less than 20% and moderate to severe pulmonic regurgitation. On 6/8/2020 he underwent a LHC/RHC demonstrated critical mid LAD, severe mixed ischemic/nonischemic CMP. On 6/10/2020 he had a LHC and PCI of LAD with Impella assist (removed 6/11). EP consulted for LifeVest evaluation. (Of note: underwent a Mcdonald mini maze procedure in 2007 for treatment of atrial fibrillation)      OV 9/25/20 he reported feeling ok. He denied any chest pain. OV 12/15/2020, He reports he only has medicare now and was unable to continue to afford the [employer]. Echo 10/1/20 demonstrated EF 35-40%, Grade 1 DD, severe bi-atrial dilation, aortic root dilated 4.7 cm, ascending aorta dilated 4.3 cm. Mild MR and AR, mod/severe LA. LOV 6/2022 - he states he feels good. Sometimes he gets shortness of breath when walking up the stairs. He has neuropathy in his feet that he takes medications for to manage. Patient is taking all cardiac medications as prescribed and tolerates them well. Patient denies current edema, chest pain, sob, palpitations, dizziness or syncope. Plan was to do 6 monthly echoes for surveillance of aortic root aneurysm Lipitor dose was increased to 40 mg daily. Brilinta was switched to Plavix due to cost.  Lasix was continued for edema and weight gain. Aspirin was held to avoid triple therapy. Today, 7/17/2023, he reports he is doing well from a cardiac standpoint. He reports he exercises at the gym regularly without limitations. He reports he is only taking eliquis once daily.   Patient

## 2023-07-26 ENCOUNTER — TELEPHONE (OUTPATIENT)
Dept: CARDIOLOGY CLINIC | Age: 73
End: 2023-07-26

## 2023-07-26 NOTE — TELEPHONE ENCOUNTER
Submitted PA for LEQVIO 1 INJECTION 90 DAYS  [facility] (Key: [id number]) STATUS: PENDING. Follow up done daily; if no response in three days we will refax for status check. If another three days goes by with no response we will call the insurance for status. Submitted PA for LEQVIO 1 INJECTION EVERY 6 MONTHS [facility]  Key: [id number]  STATUS: PENDING. Follow up done daily; if no response in three days we will refax for status check. If another three days goes by with no response we will call the insurance for status.

## 2023-07-27 NOTE — TELEPHONE ENCOUNTER
Approvedon July 26  Request Reference Number: [id number]. SARA THORNTON is approved through 12/31/2023.

## 2023-08-07 ENCOUNTER — HOSPITAL ENCOUNTER (OUTPATIENT)
Dept: CT IMAGING | Age: 73
Discharge: HOME | End: 2023-08-07
Attending: INTERNAL MEDICINE
Payer: MEDICARE

## 2023-08-07 DIAGNOSIS — I77.810 ASCENDING AORTA DILATION (HCC): ICD-10-CM

## 2023-08-07 LAB
CREAT SERPL-MCNC: 1.1 MG/DL (ref 0.8–1.3)
GFR SERPLBLD CREATININE-BSD FMLA CKD-EPI: >60 ML/MIN/{1.73_M2}

## 2023-08-07 PROCEDURE — 82565 ASSAY OF CREATININE: CPT

## 2023-08-07 PROCEDURE — 71260 CT THORAX DX C+: CPT

## 2023-08-07 PROCEDURE — 36415 COLL VENOUS BLD VENIPUNCTURE: CPT

## 2023-08-07 PROCEDURE — 6360000004 HC RX CONTRAST MEDICATION: Performed by: INTERNAL MEDICINE

## 2023-08-07 RX ADMIN — IOPAMIDOL 75 ML: 755 INJECTION, SOLUTION INTRAVENOUS at 15:28

## 2023-08-21 ENCOUNTER — TELEPHONE (OUTPATIENT)
Dept: CARDIOLOGY CLINIC | Age: 73
End: 2023-08-21

## 2023-08-21 DIAGNOSIS — I77.819 DILATATION OF AORTA (HCC): Primary | ICD-10-CM

## 2023-08-21 RX ORDER — ASPIRIN 81 MG/1
81 TABLET ORAL DAILY
COMMUNITY
End: 2023-08-26 | Stop reason: SDUPTHER

## 2023-08-21 NOTE — TELEPHONE ENCOUNTER
----- Message from Matt Roberson MD sent at 8/21/2023 12:10 AM EDT -----  Please let patient know that his CT scan is showing that his aortic root at one level is 4.9 cm wide. This is about 0.5 cm bigger than what echocardiogram had suggested. At this time, I do not suspect that the aortic root has grown that rapidly between echocardiogram and CT scan. The difference is likely because of limited visualization of aorta on echocardiogram that likely underestimated the size. At this time, I would like to continue CT chest every 6 months (repeat CT scans do not have to be with contrast every time). Please order CT chest without contrast for aortic root evaluation and aneurysm sizing for February 2024.   Thank you

## 2023-08-26 RX ORDER — ASPIRIN 81 MG/1
81 TABLET ORAL DAILY
Qty: 90 TABLET | Refills: 3 | Status: SHIPPED | OUTPATIENT
Start: 2023-08-26

## 2023-09-13 DIAGNOSIS — I25.10 ATHEROSCLEROSIS OF NATIVE CORONARY ARTERY OF NATIVE HEART, UNSPECIFIED WHETHER ANGINA PRESENT: ICD-10-CM

## 2023-09-13 RX ORDER — ALBUTEROL SULFATE 90 UG/1
4 AEROSOL, METERED RESPIRATORY (INHALATION) PRN
OUTPATIENT
Start: 2023-09-13

## 2023-09-13 RX ORDER — SODIUM CHLORIDE 9 MG/ML
INJECTION, SOLUTION INTRAVENOUS CONTINUOUS
OUTPATIENT
Start: 2023-09-13

## 2023-09-13 RX ORDER — ACETAMINOPHEN 325 MG/1
650 TABLET ORAL
OUTPATIENT
Start: 2023-09-13

## 2023-09-13 RX ORDER — EPINEPHRINE 1 MG/ML
0.3 INJECTION, SOLUTION, CONCENTRATE INTRAVENOUS PRN
OUTPATIENT
Start: 2023-09-13

## 2023-09-13 RX ORDER — ONDANSETRON 2 MG/ML
8 INJECTION INTRAMUSCULAR; INTRAVENOUS
OUTPATIENT
Start: 2023-09-13

## 2023-09-13 RX ORDER — DIPHENHYDRAMINE HYDROCHLORIDE 50 MG/ML
50 INJECTION INTRAMUSCULAR; INTRAVENOUS
OUTPATIENT
Start: 2023-09-13

## 2023-09-13 RX ORDER — FAMOTIDINE 10 MG/ML
20 INJECTION, SOLUTION INTRAVENOUS
OUTPATIENT
Start: 2023-09-13

## 2023-09-18 ENCOUNTER — TELEPHONE (OUTPATIENT)
Dept: CARDIOLOGY CLINIC | Age: 73
End: 2023-09-18

## 2023-09-18 DIAGNOSIS — E78.2 MIXED HYPERLIPIDEMIA: Primary | ICD-10-CM

## 2023-09-19 ENCOUNTER — HOSPITAL ENCOUNTER (OUTPATIENT)
Age: 73
Discharge: HOME | End: 2023-09-19
Payer: MEDICARE

## 2023-09-19 DIAGNOSIS — E78.2 MIXED HYPERLIPIDEMIA: ICD-10-CM

## 2023-09-19 LAB
CHOLEST SERPL-MCNC: 258 MG/DL (ref 0–199)
HDLC SERPL-MCNC: 43 MG/DL (ref 40–60)
LDL CHOLESTEROL CALCULATED: 189 MG/DL
TRIGL SERPL-MCNC: 132 MG/DL (ref 0–150)
VLDLC SERPL CALC-MCNC: 26 MG/DL

## 2023-09-19 PROCEDURE — 36415 COLL VENOUS BLD VENIPUNCTURE: CPT

## 2023-09-19 PROCEDURE — 80061 LIPID PANEL: CPT

## 2023-09-28 ENCOUNTER — TELEPHONE (OUTPATIENT)
Dept: CARDIOLOGY CLINIC | Age: 73
End: 2023-09-28

## 2023-09-28 DIAGNOSIS — I25.118 CORONARY ARTERY DISEASE INVOLVING NATIVE CORONARY ARTERY OF NATIVE HEART WITH OTHER FORM OF ANGINA PECTORIS (HCC): Primary | ICD-10-CM

## 2023-09-28 NOTE — TELEPHONE ENCOUNTER
----- Message from Rossana Welch MD sent at 9/28/2023 10:11 AM EDT -----  Please let patient know that his most recent lipid panel has resulted showing significantly higher LDL compared to last reading 3 years ago. This is not surprising given his intolerance to statins and hopefully will get under control as we start [place]. Once he started on Leqvio, I would like to repeat a lipid panel in 6 to 9 months.

## 2023-10-03 ENCOUNTER — HOSPITAL ENCOUNTER (OUTPATIENT)
Dept: NURSING | Age: 73
Setting detail: INFUSION SERIES
Discharge: HOME | End: 2023-10-03
Payer: MEDICARE

## 2023-10-03 VITALS
BODY MASS INDEX: 26.79 KG/M2 | TEMPERATURE: 97.2 F | HEART RATE: 105 BPM | SYSTOLIC BLOOD PRESSURE: 114 MMHG | DIASTOLIC BLOOD PRESSURE: 86 MMHG | WEIGHT: 220 LBS | HEIGHT: 76 IN | RESPIRATION RATE: 16 BRPM | OXYGEN SATURATION: 95 %

## 2023-10-03 DIAGNOSIS — E78.2 MIXED HYPERLIPIDEMIA: ICD-10-CM

## 2023-10-03 DIAGNOSIS — I25.10 ATHEROSCLEROSIS OF NATIVE CORONARY ARTERY OF NATIVE HEART, UNSPECIFIED WHETHER ANGINA PRESENT: Primary | ICD-10-CM

## 2023-10-03 PROCEDURE — 96372 THER/PROPH/DIAG INJ SC/IM: CPT

## 2023-10-03 PROCEDURE — 99211 OFF/OP EST MAY X REQ PHY/QHP: CPT

## 2023-10-03 PROCEDURE — 6360000002 HC RX W HCPCS: Performed by: INTERNAL MEDICINE

## 2023-10-03 RX ORDER — DIPHENHYDRAMINE HYDROCHLORIDE 50 MG/ML
50 INJECTION INTRAMUSCULAR; INTRAVENOUS
OUTPATIENT
Start: 2024-01-01

## 2023-10-03 RX ORDER — ALBUTEROL SULFATE 90 UG/1
4 AEROSOL, METERED RESPIRATORY (INHALATION) PRN
OUTPATIENT
Start: 2024-01-01

## 2023-10-03 RX ORDER — ACETAMINOPHEN 325 MG/1
650 TABLET ORAL
OUTPATIENT
Start: 2024-01-01

## 2023-10-03 RX ORDER — FAMOTIDINE 10 MG/ML
20 INJECTION, SOLUTION INTRAVENOUS
OUTPATIENT
Start: 2024-01-01

## 2023-10-03 RX ORDER — ONDANSETRON 2 MG/ML
8 INJECTION INTRAMUSCULAR; INTRAVENOUS
OUTPATIENT
Start: 2024-01-01

## 2023-10-03 RX ORDER — SODIUM CHLORIDE 9 MG/ML
INJECTION, SOLUTION INTRAVENOUS CONTINUOUS
OUTPATIENT
Start: 2024-01-01

## 2023-10-03 RX ORDER — EPINEPHRINE 1 MG/ML
0.3 INJECTION, SOLUTION, CONCENTRATE INTRAVENOUS PRN
OUTPATIENT
Start: 2024-01-01

## 2023-10-03 RX ADMIN — INCLISIRAN 284 MG: 284 INJECTION, SOLUTION SUBCUTANEOUS at 13:27

## 2023-10-03 ASSESSMENT — PAIN - FUNCTIONAL ASSESSMENT: PAIN_FUNCTIONAL_ASSESSMENT: NONE - DENIES PAIN

## 2023-10-03 NOTE — PROGRESS NOTES
Patient tolerates injection in left arm well.  Staying in unit for 15 minutes post injection to watch for any s/ s of reaction

## 2023-10-03 NOTE — PROGRESS NOTES
Patient shows no s/s of reaction to medication. Vital signs stable discharge instructions given and reviewed with patient. Follow up  appointment made for 3 months.  Discharged to home in stable conditon

## 2023-10-26 ENCOUNTER — CLINICAL DOCUMENTATION (OUTPATIENT)
Facility: HOSPITAL | Age: 73
End: 2023-10-26

## 2023-10-26 NOTE — PROGRESS NOTES
Patient Assistance    Met with:Called, left v/m     Navigator Type:  Infusion  Documentation Type: New Patient  Contact Type: No Contact  Navigation Status: Copay Enrollment  Status of Patient Insurance Coverage: Patient has active coverage  Form of PAP Assistance: Checkout10  Patient Assistance Sponsor Name: [facility] Enrollment    Drug Name: OTHER  Other Drug Name: Nancy Mariano  Form of PAP Assistance: [address] [place] [address]

## 2023-11-25 SDOH — HEALTH STABILITY: PHYSICAL HEALTH: ON AVERAGE, HOW MANY DAYS PER WEEK DO YOU ENGAGE IN MODERATE TO STRENUOUS EXERCISE (LIKE A BRISK WALK)?: 4 DAYS

## 2023-11-27 ENCOUNTER — OFFICE VISIT (OUTPATIENT)
Dept: PRIMARY CARE CLINIC | Age: 73
End: 2023-11-27
Payer: MEDICARE

## 2023-11-27 VITALS
TEMPERATURE: 97.7 F | HEART RATE: 90 BPM | HEIGHT: 76 IN | DIASTOLIC BLOOD PRESSURE: 74 MMHG | WEIGHT: 241 LBS | OXYGEN SATURATION: 95 % | BODY MASS INDEX: 29.35 KG/M2 | SYSTOLIC BLOOD PRESSURE: 132 MMHG

## 2023-11-27 DIAGNOSIS — I25.5 ISCHEMIC CARDIOMYOPATHY: ICD-10-CM

## 2023-11-27 DIAGNOSIS — G89.4 CHRONIC PAIN SYNDROME: ICD-10-CM

## 2023-11-27 DIAGNOSIS — E78.2 MIXED HYPERLIPIDEMIA: ICD-10-CM

## 2023-11-27 DIAGNOSIS — I77.819 DILATATION OF AORTA (HCC): ICD-10-CM

## 2023-11-27 DIAGNOSIS — Z23 NEED FOR VACCINATION: ICD-10-CM

## 2023-11-27 DIAGNOSIS — G62.89 OTHER POLYNEUROPATHY: ICD-10-CM

## 2023-11-27 DIAGNOSIS — F51.01 PRIMARY INSOMNIA: Primary | ICD-10-CM

## 2023-11-27 DIAGNOSIS — I25.118 CORONARY ARTERY DISEASE INVOLVING NATIVE CORONARY ARTERY OF NATIVE HEART WITH OTHER FORM OF ANGINA PECTORIS (HCC): ICD-10-CM

## 2023-11-27 PROCEDURE — 1036F TOBACCO NON-USER: CPT

## 2023-11-27 PROCEDURE — 3017F COLORECTAL CA SCREEN DOC REV: CPT

## 2023-11-27 PROCEDURE — G8419 CALC BMI OUT NRM PARAM NOF/U: HCPCS

## 2023-11-27 PROCEDURE — 1123F ACP DISCUSS/DSCN MKR DOCD: CPT

## 2023-11-27 PROCEDURE — G8427 DOCREV CUR MEDS BY ELIG CLIN: HCPCS

## 2023-11-27 PROCEDURE — G8484 FLU IMMUNIZE NO ADMIN: HCPCS

## 2023-11-27 PROCEDURE — 99214 OFFICE O/P EST MOD 30 MIN: CPT

## 2023-11-27 PROCEDURE — 90677 PCV20 VACCINE IM: CPT

## 2023-11-27 PROCEDURE — G0009 ADMIN PNEUMOCOCCAL VACCINE: HCPCS

## 2023-11-27 RX ORDER — TRAZODONE HYDROCHLORIDE 50 MG/1
50 TABLET ORAL NIGHTLY
Qty: 90 TABLET | Refills: 0 | Status: SHIPPED | OUTPATIENT
Start: 2023-11-27

## 2023-11-27 RX ORDER — GABAPENTIN 600 MG/1
600 TABLET ORAL 4 TIMES DAILY PRN
COMMUNITY
End: 2023-12-01 | Stop reason: SDUPTHER

## 2023-11-27 SDOH — ECONOMIC STABILITY: FOOD INSECURITY: WITHIN THE PAST 12 MONTHS, YOU WORRIED THAT YOUR FOOD WOULD RUN OUT BEFORE YOU GOT MONEY TO BUY MORE.: NEVER TRUE

## 2023-11-27 SDOH — ECONOMIC STABILITY: HOUSING INSECURITY
IN THE LAST 12 MONTHS, WAS THERE A TIME WHEN YOU DID NOT HAVE A STEADY PLACE TO SLEEP OR SLEPT IN A SHELTER (INCLUDING NOW)?: NO

## 2023-11-27 SDOH — ECONOMIC STABILITY: FOOD INSECURITY: WITHIN THE PAST 12 MONTHS, THE FOOD YOU BOUGHT JUST DIDN'T LAST AND YOU DIDN'T HAVE MONEY TO GET MORE.: NEVER TRUE

## 2023-11-27 SDOH — ECONOMIC STABILITY: INCOME INSECURITY: HOW HARD IS IT FOR YOU TO PAY FOR THE VERY BASICS LIKE FOOD, HOUSING, MEDICAL CARE, AND HEATING?: NOT HARD AT ALL

## 2023-11-27 ASSESSMENT — PATIENT HEALTH QUESTIONNAIRE - PHQ9
SUM OF ALL RESPONSES TO PHQ QUESTIONS 1-9: 0
SUM OF ALL RESPONSES TO PHQ QUESTIONS 1-9: 0
1. LITTLE INTEREST OR PLEASURE IN DOING THINGS: 0
SUM OF ALL RESPONSES TO PHQ QUESTIONS 1-9: 0
SUM OF ALL RESPONSES TO PHQ QUESTIONS 1-9: 0
2. FEELING DOWN, DEPRESSED OR HOPELESS: 0
SUM OF ALL RESPONSES TO PHQ9 QUESTIONS 1 & 2: 0

## 2023-11-27 ASSESSMENT — ANXIETY QUESTIONNAIRES
2. NOT BEING ABLE TO STOP OR CONTROL WORRYING: 0
IF YOU CHECKED OFF ANY PROBLEMS ON THIS QUESTIONNAIRE, HOW DIFFICULT HAVE THESE PROBLEMS MADE IT FOR YOU TO DO YOUR WORK, TAKE CARE OF THINGS AT HOME, OR GET ALONG WITH OTHER PEOPLE: NOT DIFFICULT AT ALL
7. FEELING AFRAID AS IF SOMETHING AWFUL MIGHT HAPPEN: 0
5. BEING SO RESTLESS THAT IT IS HARD TO SIT STILL: 0
1. FEELING NERVOUS, ANXIOUS, OR ON EDGE: 0
4. TROUBLE RELAXING: 0
GAD7 TOTAL SCORE: 0
6. BECOMING EASILY ANNOYED OR IRRITABLE: 0
3. WORRYING TOO MUCH ABOUT DIFFERENT THINGS: 0

## 2023-11-27 NOTE — PROGRESS NOTES
[facility]                                  [address] [place] [address], [address], [address] [address]         Phone: [phone]    Date of Service:  11/27/2023     Patient ID: . Key Mckeon is a 68 y.o. male      Subjective:     CC:   Chief Complaint   Patient presents with    New Patient         HPI  Key Mckeon 68 y.o.  male with past medical history of coronary artery disease s/p PCI, history of A-fib, ischemic cardiomyopathy, peripheral neuropathy presents to the office to establish care with primary care provider. Coronary artery disease with PCI  Dilatation of aorta  History of A-fib with RVR  Ischemic cardiomyopathy  -Patient takes [id number], carvedilol 6.25 mg BID, Lasix 40 mg daily, Entresto, leqvio (due to intolerance of statin)  -Echocardiogram on 12/12/2022 showed EF of 30 to 35%, moderate global hypokinesis, severe biatrial enlargement, mild eccentric mitral and, known regurgitation. Ascending aorta mildly dilated 4.4 cm.  -Patient was advised by cardiologist to obtain CT chest to evaluate ascending aorta dilatation, CT chest showed aortic root 4.9 cm. Atrial fibrillation  -Patient takes carvedilol and apixaban 5 mg twice a day  - patient denies any  palpitations, chest pain, shortness of breath, lightheadedness, dizziness or other symptoms. Hyperlipidemia  -Last lipid panel on 9/19/2023 showed , HDL 43, total cholesterol 258, triglyceride 132. - Patient was started on leqvio and advised by cardiologist to repeat lipid panel in 6 to 9 months    Neuropathy of feet  - patient takes gabapentin 600mg 4 times a day prescribed by previous PCP. - patient reported that he had multiple tests in the past.   - patient follows with podiatrist and neurologist     Insomnia  - patient takes temazepam 30mg nightly  - patient has no concerns or complaints.    - patient is interested in following with sleep

## 2023-11-30 SDOH — HEALTH STABILITY: PHYSICAL HEALTH: ON AVERAGE, HOW MANY DAYS PER WEEK DO YOU ENGAGE IN MODERATE TO STRENUOUS EXERCISE (LIKE A BRISK WALK)?: 2 DAYS

## 2023-11-30 ASSESSMENT — SOCIAL DETERMINANTS OF HEALTH (SDOH)
WITHIN THE LAST YEAR, HAVE YOU BEEN HUMILIATED OR EMOTIONALLY ABUSED IN OTHER WAYS BY YOUR PARTNER OR EX-PARTNER?: NO
WITHIN THE LAST YEAR, HAVE TO BEEN RAPED OR FORCED TO HAVE ANY KIND OF SEXUAL ACTIVITY BY YOUR PARTNER OR EX-PARTNER?: NO
WITHIN THE LAST YEAR, HAVE YOU BEEN AFRAID OF YOUR PARTNER OR EX-PARTNER?: NO

## 2023-12-01 ENCOUNTER — OFFICE VISIT (OUTPATIENT)
Dept: FAMILY MEDICINE CLINIC | Age: 73
End: 2023-12-01
Payer: MEDICARE

## 2023-12-01 VITALS
SYSTOLIC BLOOD PRESSURE: 108 MMHG | BODY MASS INDEX: 29.57 KG/M2 | OXYGEN SATURATION: 95 % | HEART RATE: 76 BPM | DIASTOLIC BLOOD PRESSURE: 72 MMHG | WEIGHT: 242.8 LBS | HEIGHT: 76 IN

## 2023-12-01 DIAGNOSIS — G62.9 PERIPHERAL POLYNEUROPATHY: Primary | ICD-10-CM

## 2023-12-01 DIAGNOSIS — F51.01 PRIMARY INSOMNIA: ICD-10-CM

## 2023-12-01 PROCEDURE — G8419 CALC BMI OUT NRM PARAM NOF/U: HCPCS | Performed by: STUDENT IN AN ORGANIZED HEALTH CARE EDUCATION/TRAINING PROGRAM

## 2023-12-01 PROCEDURE — 99214 OFFICE O/P EST MOD 30 MIN: CPT | Performed by: STUDENT IN AN ORGANIZED HEALTH CARE EDUCATION/TRAINING PROGRAM

## 2023-12-01 PROCEDURE — G8484 FLU IMMUNIZE NO ADMIN: HCPCS | Performed by: STUDENT IN AN ORGANIZED HEALTH CARE EDUCATION/TRAINING PROGRAM

## 2023-12-01 PROCEDURE — G8427 DOCREV CUR MEDS BY ELIG CLIN: HCPCS | Performed by: STUDENT IN AN ORGANIZED HEALTH CARE EDUCATION/TRAINING PROGRAM

## 2023-12-01 PROCEDURE — 3017F COLORECTAL CA SCREEN DOC REV: CPT | Performed by: STUDENT IN AN ORGANIZED HEALTH CARE EDUCATION/TRAINING PROGRAM

## 2023-12-01 PROCEDURE — 1123F ACP DISCUSS/DSCN MKR DOCD: CPT | Performed by: STUDENT IN AN ORGANIZED HEALTH CARE EDUCATION/TRAINING PROGRAM

## 2023-12-01 PROCEDURE — 1036F TOBACCO NON-USER: CPT | Performed by: STUDENT IN AN ORGANIZED HEALTH CARE EDUCATION/TRAINING PROGRAM

## 2023-12-01 RX ORDER — TEMAZEPAM 30 MG/1
CAPSULE ORAL
Qty: 30 CAPSULE | Refills: 0 | Status: SHIPPED | OUTPATIENT
Start: 2023-12-03 | End: 2024-01-01

## 2023-12-01 RX ORDER — TEMAZEPAM 30 MG/1
CAPSULE ORAL
COMMUNITY
End: 2023-12-01 | Stop reason: SDUPTHER

## 2023-12-01 RX ORDER — ALPRAZOLAM 0.25 MG/1
0.25 TABLET ORAL 2 TIMES DAILY PRN
COMMUNITY
End: 2023-12-01 | Stop reason: SDUPTHER

## 2023-12-01 RX ORDER — GABAPENTIN 600 MG/1
600 TABLET ORAL 4 TIMES DAILY PRN
Qty: 90 TABLET | Refills: 3 | Status: SHIPPED | OUTPATIENT
Start: 2023-12-01 | End: 2024-02-29

## 2023-12-01 RX ORDER — ALPRAZOLAM 0.25 MG/1
0.25 TABLET ORAL 2 TIMES DAILY PRN
Qty: 30 TABLET | Refills: 0 | Status: SHIPPED | OUTPATIENT
Start: 2023-12-03 | End: 2024-01-02

## 2023-12-01 ASSESSMENT — PATIENT HEALTH QUESTIONNAIRE - PHQ9
SUM OF ALL RESPONSES TO PHQ9 QUESTIONS 1 & 2: 0
SUM OF ALL RESPONSES TO PHQ QUESTIONS 1-9: 0
1. LITTLE INTEREST OR PLEASURE IN DOING THINGS: 0
2. FEELING DOWN, DEPRESSED OR HOPELESS: 0

## 2023-12-01 ASSESSMENT — ANXIETY QUESTIONNAIRES
IF YOU CHECKED OFF ANY PROBLEMS ON THIS QUESTIONNAIRE, HOW DIFFICULT HAVE THESE PROBLEMS MADE IT FOR YOU TO DO YOUR WORK, TAKE CARE OF THINGS AT HOME, OR GET ALONG WITH OTHER PEOPLE: NOT DIFFICULT AT ALL
6. BECOMING EASILY ANNOYED OR IRRITABLE: 0
7. FEELING AFRAID AS IF SOMETHING AWFUL MIGHT HAPPEN: 0
5. BEING SO RESTLESS THAT IT IS HARD TO SIT STILL: 0
3. WORRYING TOO MUCH ABOUT DIFFERENT THINGS: 1
4. TROUBLE RELAXING: 0
2. NOT BEING ABLE TO STOP OR CONTROL WORRYING: 0
1. FEELING NERVOUS, ANXIOUS, OR ON EDGE: 0
GAD7 TOTAL SCORE: 1

## 2023-12-01 ASSESSMENT — ENCOUNTER SYMPTOMS
DIARRHEA: 0
SHORTNESS OF BREATH: 0
CONSTIPATION: 0
COUGH: 0

## 2023-12-01 NOTE — PROGRESS NOTES
visit. Sincerely,  Edmond Philippe MD MPH  Family & Preventive Medicine    This note was generated completely or in part utilizing Dragon dictation speech recognition software. Occasionally, words are mistranscribed and despite editing, the text may contain inaccuracies due to incorrect word recognition.   If further clarification is needed please contact the office

## 2023-12-15 ENCOUNTER — OFFICE VISIT (OUTPATIENT)
Dept: PULMONOLOGY | Age: 73
End: 2023-12-15

## 2023-12-15 VITALS
HEART RATE: 91 BPM | SYSTOLIC BLOOD PRESSURE: 137 MMHG | HEIGHT: 76 IN | OXYGEN SATURATION: 98 % | WEIGHT: 245 LBS | BODY MASS INDEX: 29.83 KG/M2 | RESPIRATION RATE: 16 BRPM | DIASTOLIC BLOOD PRESSURE: 86 MMHG

## 2023-12-15 DIAGNOSIS — G47.31 CSA (CENTRAL SLEEP APNEA): Primary | ICD-10-CM

## 2023-12-15 DIAGNOSIS — G89.4 CHRONIC PAIN SYNDROME: ICD-10-CM

## 2023-12-15 DIAGNOSIS — I48.91 ATRIAL FIBRILLATION, UNSPECIFIED TYPE (HCC): ICD-10-CM

## 2023-12-15 DIAGNOSIS — I25.5 ISCHEMIC CARDIOMYOPATHY: ICD-10-CM

## 2023-12-15 DIAGNOSIS — I25.83 CORONARY ARTERY DISEASE DUE TO LIPID RICH PLAQUE: ICD-10-CM

## 2023-12-15 DIAGNOSIS — I25.10 CORONARY ARTERY DISEASE DUE TO LIPID RICH PLAQUE: ICD-10-CM

## 2023-12-15 DIAGNOSIS — G47.00 INSOMNIA, UNSPECIFIED TYPE: ICD-10-CM

## 2023-12-15 DIAGNOSIS — G47.33 OSA (OBSTRUCTIVE SLEEP APNEA): ICD-10-CM

## 2023-12-15 ASSESSMENT — SLEEP AND FATIGUE QUESTIONNAIRES
HOW LIKELY ARE YOU TO NOD OFF OR FALL ASLEEP WHILE LYING DOWN TO REST IN THE AFTERNOON WHEN CIRCUMSTANCES PERMIT: 3
HOW LIKELY ARE YOU TO NOD OFF OR FALL ASLEEP WHILE SITTING QUIETLY AFTER LUNCH WITHOUT ALCOHOL: 1
ESS TOTAL SCORE: 4
HOW LIKELY ARE YOU TO NOD OFF OR FALL ASLEEP WHILE SITTING AND TALKING TO SOMEONE: 0
HOW LIKELY ARE YOU TO NOD OFF OR FALL ASLEEP IN A CAR, WHILE STOPPED FOR A FEW MINUTES IN TRAFFIC: 0
HOW LIKELY ARE YOU TO NOD OFF OR FALL ASLEEP WHILE SITTING INACTIVE IN A PUBLIC PLACE: 0
HOW LIKELY ARE YOU TO NOD OFF OR FALL ASLEEP WHEN YOU ARE A PASSENGER IN A CAR FOR AN HOUR WITHOUT A BREAK: 0
HOW LIKELY ARE YOU TO NOD OFF OR FALL ASLEEP WHILE SITTING AND READING: 0
HOW LIKELY ARE YOU TO NOD OFF OR FALL ASLEEP WHILE WATCHING TV: 0
NECK CIRCUMFERENCE (INCHES): 16.5

## 2023-12-15 NOTE — PATIENT INSTRUCTIONS
ASSESSMENT/PLAN:   Diagnosis Orders   1. CSA (central sleep apnea)        2. JHONY (obstructive sleep apnea)        3. Insomnia, unspecified type        4. Chronic pain syndrome        5. Coronary artery disease due to lipid rich plaque        6. Ischemic cardiomyopathy        7. Atrial fibrillation, unspecified type ([address])              I  RECOMMENDATIONS:     he will be scheduled for polysomnography in order to evaluate for the presence and severity of obstructive sleep apnea. He was given a discussion of the pathophysiology, evaluation and treatment of apnea. Thyroid function tests are recommended if not done recently. Advised to avoid driving when too sleepy to function safely and given a discussion of the risks of untreated apnea such as accidents, cognitive impairment, mood impairment, high blood pressure, various cardiac diseases and stroke.               12/15/2023    10:35 AM   Sleep Medicine   Sitting and reading 0   Watching TV 0   Sitting, inactive in a public place (e.g. a theatre or a meeting) 0   As a passenger in a car for an hour without a break 0   Lying down to rest in the afternoon when circumstances permit 3   Sitting and talking to someone 0   Sitting quietly after a lunch without alcohol 1   In a car, while stopped for a few minutes in traffic 0   [place] Sleepiness Score 4   Neck circumference (Inches) 16.5           Neck circumference (Inches): 16.5  Mallampati class 2        Will get Sleep study  I will call you with results    Will do inlab study  With gabapentin and temazepam  And will r/o central sleep apnea and obstructive sleep apnea  Ok to delay sleep study until March/April of 2024  As the patient has other surgeries going on in Feb of 2024        Insomnia/Neuropathy    Continue with  Temazepam 30 mg nightly  Gabapentin 600 mg QID    Ok to continue   I will take over the temazepam writing      Tired and failed  Nortriptyline  Trazodone      CAD/afib  Last echo done 12/22/22

## 2023-12-15 NOTE — PROGRESS NOTES
Dianelys Watson (: 1950 ) is a 68 y.o. male here for an evaluation of   Chief Complaint   Patient presents with    Establish Care     No Previous Sleep Studies    Insomnia         ASSESSMENT/PLAN:   Diagnosis Orders   1. CSA (central sleep apnea)        2. JHONY (obstructive sleep apnea)        3. Insomnia, unspecified type        4. Chronic pain syndrome        5. Coronary artery disease due to lipid rich plaque        6. Ischemic cardiomyopathy        7. Atrial fibrillation, unspecified type ([address])              I  RECOMMENDATIONS:     he will be scheduled for polysomnography in order to evaluate for the presence and severity of obstructive sleep apnea. He was given a discussion of the pathophysiology, evaluation and treatment of apnea. Thyroid function tests are recommended if not done recently. Advised to avoid driving when too sleepy to function safely and given a discussion of the risks of untreated apnea such as accidents, cognitive impairment, mood impairment, high blood pressure, various cardiac diseases and stroke.               12/15/2023    10:35 AM   Sleep Medicine   Sitting and reading 0   Watching TV 0   Sitting, inactive in a public place (e.g. a theatre or a meeting) 0   As a passenger in a car for an hour without a break 0   Lying down to rest in the afternoon when circumstances permit 3   Sitting and talking to someone 0   Sitting quietly after a lunch without alcohol 1   In a car, while stopped for a few minutes in traffic 0   [place] Sleepiness Score 4   Neck circumference (Inches) 16.5           Neck circumference (Inches): 16.5  Mallampati class 2        Will get Sleep study  I will call you with results    Will do inlab study  With gabapentin and temazepam  And will r/o central sleep apnea and obstructive sleep apnea  Ok to delay sleep study until 2024  As the patient has other surgeries going on in         Insomnia/Neuropathy    Continue with  Temazepam 30 mg

## 2023-12-15 NOTE — PROGRESS NOTES
MA Communication:   The following orders are received by verbal communication from Katie Fulton MD    Orders include:  PSG in April, f/u after

## 2023-12-28 DIAGNOSIS — F51.01 PRIMARY INSOMNIA: ICD-10-CM

## 2023-12-28 RX ORDER — ALPRAZOLAM 0.25 MG/1
0.25 TABLET ORAL 2 TIMES DAILY PRN
Qty: 30 TABLET | Refills: 0 | Status: CANCELLED | OUTPATIENT
Start: 2023-12-28 | End: 2024-01-27

## 2023-12-28 RX ORDER — TEMAZEPAM 30 MG/1
CAPSULE ORAL
Qty: 30 CAPSULE | Refills: 0 | Status: SHIPPED | OUTPATIENT
Start: 2023-12-28 | End: 2024-01-26

## 2023-12-28 NOTE — TELEPHONE ENCOUNTER
Miguel Angel requestings rx for temazepam (RESTORIL) 30 MG capsule     New patient appt 12/1/23  Pre-op appt 1/31/24

## 2023-12-28 NOTE — TELEPHONE ENCOUNTER
Last Office Visit  -  12/1/2023  Next Office Visit  -  01/31/2024  Last Filled  -  12/3/23  Last UDS -    Contract -      Patient called pharmacy and requesting a 30 day supply instead of 15. Directions are ok, but requesting quantity to be changed from 30 to 60. Please advise in PCP absence.

## 2023-12-29 NOTE — TELEPHONE ENCOUNTER
Called and spoke with patient and made aware to contact sleep clinic for management of this medication.  Pt states he will discuss at next appointment 1/31/24

## 2023-12-29 NOTE — TELEPHONE ENCOUNTER
Please advise patient to call sleep clinic for any further management and refills of insomnia medications. If patient has further questions will discuss at upcoming appointment on January 3rd. Thanks!

## 2024-01-02 ENCOUNTER — HOSPITAL ENCOUNTER (OUTPATIENT)
Dept: NURSING | Age: 74
Setting detail: INFUSION SERIES
Discharge: HOME | End: 2024-01-02

## 2024-01-03 ENCOUNTER — HOSPITAL ENCOUNTER (OUTPATIENT)
Dept: NURSING | Age: 74
Setting detail: INFUSION SERIES
Discharge: HOME | End: 2024-01-03
Payer: MEDICARE

## 2024-01-03 VITALS
SYSTOLIC BLOOD PRESSURE: 137 MMHG | RESPIRATION RATE: 16 BRPM | TEMPERATURE: 97 F | HEART RATE: 94 BPM | DIASTOLIC BLOOD PRESSURE: 80 MMHG | HEIGHT: 76 IN | OXYGEN SATURATION: 97 % | BODY MASS INDEX: 28.01 KG/M2 | WEIGHT: 230 LBS

## 2024-01-03 DIAGNOSIS — E78.2 MIXED HYPERLIPIDEMIA: Primary | ICD-10-CM

## 2024-01-03 DIAGNOSIS — I25.10 ATHEROSCLEROSIS OF NATIVE CORONARY ARTERY OF NATIVE HEART, UNSPECIFIED WHETHER ANGINA PRESENT: ICD-10-CM

## 2024-01-03 PROCEDURE — 6360000002 HC RX W HCPCS: Performed by: INTERNAL MEDICINE

## 2024-01-03 PROCEDURE — 96372 THER/PROPH/DIAG INJ SC/IM: CPT

## 2024-01-03 PROCEDURE — 99211 OFF/OP EST MAY X REQ PHY/QHP: CPT

## 2024-01-03 RX ORDER — ALBUTEROL SULFATE 90 UG/1
4 AEROSOL, METERED RESPIRATORY (INHALATION) PRN
OUTPATIENT
Start: 2024-06-29

## 2024-01-03 RX ORDER — EPINEPHRINE 1 MG/ML
0.3 INJECTION, SOLUTION, CONCENTRATE INTRAVENOUS PRN
OUTPATIENT
Start: 2024-06-29

## 2024-01-03 RX ORDER — SODIUM CHLORIDE 9 MG/ML
INJECTION, SOLUTION INTRAVENOUS CONTINUOUS
OUTPATIENT
Start: 2024-06-29

## 2024-01-03 RX ORDER — DIPHENHYDRAMINE HYDROCHLORIDE 50 MG/ML
50 INJECTION INTRAMUSCULAR; INTRAVENOUS
OUTPATIENT
Start: 2024-06-29

## 2024-01-03 RX ORDER — FAMOTIDINE 10 MG/ML
20 INJECTION, SOLUTION INTRAVENOUS
OUTPATIENT
Start: 2024-06-29

## 2024-01-03 RX ORDER — ACETAMINOPHEN 325 MG/1
650 TABLET ORAL
OUTPATIENT
Start: 2024-06-29

## 2024-01-03 RX ORDER — ONDANSETRON 2 MG/ML
8 INJECTION INTRAMUSCULAR; INTRAVENOUS
OUTPATIENT
Start: 2024-06-29

## 2024-01-03 RX ADMIN — INCLISIRAN 284 MG: 284 INJECTION, SOLUTION SUBCUTANEOUS at 13:13

## 2024-01-03 ASSESSMENT — PAIN - FUNCTIONAL ASSESSMENT: PAIN_FUNCTIONAL_ASSESSMENT: NONE - DENIES PAIN

## 2024-01-25 DIAGNOSIS — I25.5 ISCHEMIC CARDIOMYOPATHY: Primary | ICD-10-CM

## 2024-01-25 DIAGNOSIS — I50.41 ACUTE COMBINED SYSTOLIC AND DIASTOLIC CONGESTIVE HEART FAILURE (HCC): ICD-10-CM

## 2024-01-25 NOTE — TELEPHONE ENCOUNTER
Please schedule pt for yearly F/U in July 2024 with FXW. Route back for refills. Thanks     LOV FXW 7/17/2023

## 2024-01-29 NOTE — TELEPHONE ENCOUNTER
01/29 2nd attempt to contact pt [phone] (Home Phone) to schedule FXW OV, LVM for pt to call back.

## 2024-01-31 ENCOUNTER — OFFICE VISIT (OUTPATIENT)
Dept: FAMILY MEDICINE CLINIC | Age: 74
End: 2024-01-31
Payer: MEDICARE

## 2024-01-31 ENCOUNTER — TELEPHONE (OUTPATIENT)
Dept: CARDIOLOGY CLINIC | Age: 74
End: 2024-01-31

## 2024-01-31 VITALS
SYSTOLIC BLOOD PRESSURE: 118 MMHG | DIASTOLIC BLOOD PRESSURE: 68 MMHG | BODY MASS INDEX: 28.98 KG/M2 | OXYGEN SATURATION: 97 % | HEIGHT: 76 IN | WEIGHT: 238 LBS | HEART RATE: 65 BPM

## 2024-01-31 DIAGNOSIS — Z01.818 PREOP EXAMINATION: Primary | ICD-10-CM

## 2024-01-31 DIAGNOSIS — H26.9 CATARACT OF BOTH EYES, UNSPECIFIED CATARACT TYPE: ICD-10-CM

## 2024-01-31 PROCEDURE — G8419 CALC BMI OUT NRM PARAM NOF/U: HCPCS | Performed by: STUDENT IN AN ORGANIZED HEALTH CARE EDUCATION/TRAINING PROGRAM

## 2024-01-31 PROCEDURE — 93000 ELECTROCARDIOGRAM COMPLETE: CPT | Performed by: STUDENT IN AN ORGANIZED HEALTH CARE EDUCATION/TRAINING PROGRAM

## 2024-01-31 PROCEDURE — 99213 OFFICE O/P EST LOW 20 MIN: CPT | Performed by: STUDENT IN AN ORGANIZED HEALTH CARE EDUCATION/TRAINING PROGRAM

## 2024-01-31 PROCEDURE — G8427 DOCREV CUR MEDS BY ELIG CLIN: HCPCS | Performed by: STUDENT IN AN ORGANIZED HEALTH CARE EDUCATION/TRAINING PROGRAM

## 2024-01-31 PROCEDURE — G8484 FLU IMMUNIZE NO ADMIN: HCPCS | Performed by: STUDENT IN AN ORGANIZED HEALTH CARE EDUCATION/TRAINING PROGRAM

## 2024-01-31 ASSESSMENT — ENCOUNTER SYMPTOMS
DIARRHEA: 0
COUGH: 0
SHORTNESS OF BREATH: 0
CONSTIPATION: 0

## 2024-01-31 NOTE — PROGRESS NOTES
[facility]  [address],  [place] [address]  Pre-Operative Evaluation    DIAGNOSIS: Cataract disease      PROCEDURE:  Cataract surgery       History Obtained From:  patient    HISTORY OF PRESENT ILLNESS:    The patient is a 73 y.o. male with significant past medical history of  Afib, HLD, CHF, cardiomyopathy, dilated ascending aorta, and CAD s/p PCI LAD 6/2020, peripheral neuropathy, cataracts who presents for preop evaluation.       Past Medical History:   Diagnosis Date    Atrial fibrillation (HCC)     Cardiomyopathy (HCC) 06/2020    ischemic    Dilated aortic root (HCC)     Headache     from big pain of feet -- I did already almost all possible    HFrEF (heart failure with reduced ejection fraction) (HCC)     Peripheral neuropathy      Past Surgical History:   Procedure Laterality Date    ANGIOPLASTY      stent in LAD in 2020    CARDIOVERSION      2008     Current Outpatient Medications   Medication Sig Dispense Refill    gabapentin (NEURONTIN) 600 MG tablet Take 1 tablet by mouth 4 times daily as needed (Neuropathy pain) for up to 90 days. 90 tablet 3    aspirin 81 MG EC tablet Take 1 tablet by mouth daily 90 tablet 3    sacubitril-valsartan (ENTRESTO) 24-26 MG per tablet Take 1 tablet by mouth 2 times daily (Patient taking differently: Take 1 tablet by mouth as needed) 60 tablet 0    furosemide (LASIX) 20 MG tablet TAKE 1 TABLET BY MOUTH DAILY (Patient taking differently: Take 1 tablet by mouth as needed) 90 tablet 3    apixaban (ELIQUIS) 5 MG TABS tablet Take by mouth 2 times daily      carvedilol (COREG) 6.25 MG tablet Take 1 tablet by mouth 2 times daily (with meals) 60 tablet 0     No current facility-administered medications for this visit.     Allergies:  Penicillins  History of allergic reaction to anesthesia:  No     Social History     Tobacco Use   Smoking Status Never   Smokeless Tobacco Never   Tobacco Comments    a little in the high school     The patient

## 2024-01-31 NOTE — TELEPHONE ENCOUNTER
CARDIAC CLEARANCE REQUEST    What type of procedure are you having:  Cataracts   Are you taking any blood thinners:  Eliquis   Type on anesthesia:  Local anesthetic   When is your procedure scheduled for:  2/5/24 930am  What physician is performing your procedure:  Shannan Meneses MD  Phone Number:  [phone]  Fax number to send the letter:   [phone]

## 2024-01-31 NOTE — TELEPHONE ENCOUNTER
01/31 3rd attempt, called pt @ [phone] and lvm to return call to schedule appt w/ fxw. Mailing letter

## 2024-02-01 ENCOUNTER — TELEPHONE (OUTPATIENT)
Dept: CARDIOLOGY CLINIC | Age: 74
End: 2024-02-01

## 2024-02-01 NOTE — TELEPHONE ENCOUNTER
Hieu Edwards, 1950    Cardiac Risk Assessment    What type of procedure are you having?  Cataract Surgery    When is your procedure scheduled for?  Monday Feb 5    Medications to be stopped.  Eliquis, aspirin.  They don't have to be stopped, it's up to the provider.    What physician is performing your procedure?  Shannan Meneses       Fax number to send the letter:   [phone]    Cardiologist:   Dr. Grigsby    Last Appointment:   07/17/2023    Next Appointment:   none    Are you on any blood thinners?   yes    Last Ekg  01/31/2024    Last Echo:   12/22/2022

## 2024-02-02 DIAGNOSIS — F51.01 PRIMARY INSOMNIA: ICD-10-CM

## 2024-02-02 DIAGNOSIS — I50.41 ACUTE COMBINED SYSTOLIC AND DIASTOLIC CONGESTIVE HEART FAILURE (HCC): ICD-10-CM

## 2024-02-02 RX ORDER — TEMAZEPAM 30 MG/1
CAPSULE ORAL
Qty: 30 CAPSULE | Refills: 0 | Status: CANCELLED | OUTPATIENT
Start: 2024-02-02 | End: 2024-03-02

## 2024-02-02 RX ORDER — ALPRAZOLAM 0.25 MG/1
0.25 TABLET ORAL 2 TIMES DAILY PRN
Qty: 30 TABLET | Refills: 0 | Status: CANCELLED | OUTPATIENT
Start: 2024-02-02 | End: 2024-03-03

## 2024-02-02 NOTE — TELEPHONE ENCOUNTER
Kerry from [facility] called reguarding clearance. Fxw message given. V/u they need the clearance letter faxed to [phone]

## 2024-02-02 NOTE — TELEPHONE ENCOUNTER
Kanchan from [facility] called to see if pt has been cleared. Advised that we had to here from w. She stated that surgery is 2/5 and pt has to be there at 730am. She would like a call back at [phone]. Please advise.

## 2024-02-02 NOTE — TELEPHONE ENCOUNTER
Pharmacy requesting refill    temazepam (RESTORIL) 30 MG capsule       ALPRAZolam (XANAX) 0.25 MG tablet     Miguel Angel 1982 [facility]

## 2024-02-02 NOTE — TELEPHONE ENCOUNTER
Based on most recent outpatient cardiology evaluation, patient is low risk from cardiac standpoint for cataract surgery with local anesthesia.  No further cardiac investigations are needed prior to planned procedure.  Eliquis can be held for 48 to 72 hours around the procedure time without bridging.

## 2024-02-02 NOTE — TELEPHONE ENCOUNTER
Please advise patient that sleep clinic is managing all sleep related refills per Dr. Seymour note 12/15/2023. Thanks!

## 2024-02-02 NOTE — TELEPHONE ENCOUNTER
Pt called in to check status of clearance. I advised pt we would call him when we have a response from [facility]

## 2024-02-02 NOTE — TELEPHONE ENCOUNTER
Last Office Visit  -  1/31/24  Next Office Visit  -  2/6/24    Last Filled  -  12/8/23, 12/23/23  Last UDS -    Contract -

## 2024-02-05 RX ORDER — TEMAZEPAM 30 MG/1
CAPSULE ORAL
Qty: 30 CAPSULE | Refills: 0 | OUTPATIENT
Start: 2024-02-05

## 2024-02-05 RX ORDER — CARVEDILOL 6.25 MG/1
6.25 TABLET ORAL 2 TIMES DAILY WITH MEALS
Qty: 180 TABLET | Refills: 1 | Status: SHIPPED | OUTPATIENT
Start: 2024-02-05

## 2024-02-05 RX ORDER — SACUBITRIL AND VALSARTAN 24; 26 MG/1; MG/1
1 TABLET, FILM COATED ORAL 2 TIMES DAILY
Qty: 180 TABLET | Refills: 1 | Status: SHIPPED | OUTPATIENT
Start: 2024-02-05

## 2024-02-05 RX ORDER — ALPRAZOLAM 0.25 MG/1
TABLET ORAL
Qty: 30 TABLET | Refills: 0 | OUTPATIENT
Start: 2024-02-05

## 2024-02-05 NOTE — TELEPHONE ENCOUNTER
Last Office Visit  -  1/31/24  Next Office Visit  -  2/6/24    Last Filled  -  12/28/23  Last UDS -  6/6/2020  Contract -  n/a

## 2024-02-07 ENCOUNTER — OFFICE VISIT (OUTPATIENT)
Dept: PULMONOLOGY | Age: 74
End: 2024-02-07
Payer: MEDICARE

## 2024-02-07 VITALS
TEMPERATURE: 99 F | OXYGEN SATURATION: 98 % | BODY MASS INDEX: 29.47 KG/M2 | HEART RATE: 89 BPM | RESPIRATION RATE: 16 BRPM | WEIGHT: 242 LBS | HEIGHT: 76 IN | SYSTOLIC BLOOD PRESSURE: 130 MMHG | DIASTOLIC BLOOD PRESSURE: 76 MMHG

## 2024-02-07 DIAGNOSIS — G47.00 INSOMNIA, UNSPECIFIED TYPE: ICD-10-CM

## 2024-02-07 DIAGNOSIS — I25.83 CORONARY ARTERY DISEASE DUE TO LIPID RICH PLAQUE: ICD-10-CM

## 2024-02-07 DIAGNOSIS — G89.4 CHRONIC PAIN SYNDROME: ICD-10-CM

## 2024-02-07 DIAGNOSIS — I25.5 ISCHEMIC CARDIOMYOPATHY: ICD-10-CM

## 2024-02-07 DIAGNOSIS — G47.31 CSA (CENTRAL SLEEP APNEA): Primary | ICD-10-CM

## 2024-02-07 DIAGNOSIS — I25.10 CORONARY ARTERY DISEASE DUE TO LIPID RICH PLAQUE: ICD-10-CM

## 2024-02-07 DIAGNOSIS — G47.33 OSA (OBSTRUCTIVE SLEEP APNEA): ICD-10-CM

## 2024-02-07 PROCEDURE — 1036F TOBACCO NON-USER: CPT | Performed by: INTERNAL MEDICINE

## 2024-02-07 PROCEDURE — 99214 OFFICE O/P EST MOD 30 MIN: CPT | Performed by: INTERNAL MEDICINE

## 2024-02-07 PROCEDURE — G8484 FLU IMMUNIZE NO ADMIN: HCPCS | Performed by: INTERNAL MEDICINE

## 2024-02-07 PROCEDURE — 1123F ACP DISCUSS/DSCN MKR DOCD: CPT | Performed by: INTERNAL MEDICINE

## 2024-02-07 PROCEDURE — G8419 CALC BMI OUT NRM PARAM NOF/U: HCPCS | Performed by: INTERNAL MEDICINE

## 2024-02-07 PROCEDURE — G8427 DOCREV CUR MEDS BY ELIG CLIN: HCPCS | Performed by: INTERNAL MEDICINE

## 2024-02-07 PROCEDURE — 3017F COLORECTAL CA SCREEN DOC REV: CPT | Performed by: INTERNAL MEDICINE

## 2024-02-07 RX ORDER — TEMAZEPAM 30 MG/1
30 CAPSULE ORAL NIGHTLY PRN
Qty: 30 CAPSULE | Refills: 2 | Status: SHIPPED | OUTPATIENT
Start: 2024-02-07 | End: 2024-03-08

## 2024-02-07 RX ORDER — SILICONE ADHESIVE 1.5" X 3"
SHEET (EA) TOPICAL
COMMUNITY
Start: 2024-02-06

## 2024-02-07 ASSESSMENT — ENCOUNTER SYMPTOMS
ALLERGIC/IMMUNOLOGIC NEGATIVE: 1
GASTROINTESTINAL NEGATIVE: 1
EYES NEGATIVE: 1
RESPIRATORY NEGATIVE: 1

## 2024-02-07 NOTE — PATIENT INSTRUCTIONS
ASSESSMENT/PLAN:   Diagnosis Orders   1. CSA (central sleep apnea)        2. JHONY (obstructive sleep apnea)        3. Insomnia, unspecified type        4. Chronic pain syndrome        5. Coronary artery disease due to lipid rich plaque        6. Ischemic cardiomyopathy                RECOMMENDATIONS:       Advised to avoid driving when too sleepy to function safely and given a discussion of the risks of untreated apnea such as accidents, cognitive impairment, mood impairment, high blood pressure, various cardiac diseases and stroke.              12/15/2023    10:35 AM   Sleep Medicine   Sitting and reading 0   Watching TV 0   Sitting, inactive in a public place (e.g. a theatre or a meeting) 0   As a passenger in a car for an hour without a break 0   Lying down to rest in the afternoon when circumstances permit 3   Sitting and talking to someone 0   Sitting quietly after a lunch without alcohol 1   In a car, while stopped for a few minutes in traffic 0   [place] Sleepiness Score 4   Neck circumference (Inches) 16.5           Neck circumference (Inches): 16.5  Mallampati class 2        Will get Sleep study  I will call you with results    Will do inlab study  With gabapentin and temazepam  And will r/o central sleep apnea and obstructive sleep apnea  Ok to delay sleep study until March/April of 2024  As the patient has other surgeries going on in Feb of 2024    After sleep study would like office visit.        Insomnia/Neuropathy    Continue with  Temazepam 30 mg nightly  Gabapentin 600 mg QID    Ok to continue   I will take over the temazepam writing      Tired and failed  Nortriptyline  Trazodone      CAD/afib  Last echo done 12/22/22   Summary   -- The left ventricular systolic function is moderately reduced with an   ejection fraction of 30-35 %. Moderate global hypokinesis with regional   variation. Left ventricular diastolic filling pressure are indeterminate.   -- Severe bi-atrial enlargement.   -- MIld

## 2024-02-07 NOTE — PROGRESS NOTES
Hieu Edwards (: 1950 ) is a 73 y.o. male here for an evaluation of   Chief Complaint   Patient presents with    Follow-up         ASSESSMENT/PLAN:   Diagnosis Orders   1. CSA (central sleep apnea)        2. JHONY (obstructive sleep apnea)        3. Insomnia, unspecified type        4. Chronic pain syndrome        5. Coronary artery disease due to lipid rich plaque        6. Ischemic cardiomyopathy                RECOMMENDATIONS:       Advised to avoid driving when too sleepy to function safely and given a discussion of the risks of untreated apnea such as accidents, cognitive impairment, mood impairment, high blood pressure, various cardiac diseases and stroke.              12/15/2023    10:35 AM   Sleep Medicine   Sitting and reading 0   Watching TV 0   Sitting, inactive in a public place (e.g. a theatre or a meeting) 0   As a passenger in a car for an hour without a break 0   Lying down to rest in the afternoon when circumstances permit 3   Sitting and talking to someone 0   Sitting quietly after a lunch without alcohol 1   In a car, while stopped for a few minutes in traffic 0   [place] Sleepiness Score 4   Neck circumference (Inches) 16.5           Neck circumference (Inches): 16.5  Mallampati class 2        Will get Sleep study  I will call you with results    Will do inlab study  With gabapentin and temazepam  And will r/o central sleep apnea and obstructive sleep apnea  Ok to delay sleep study until March/2024  As the patient has other surgeries going on in     After sleep study would like office visit.        Insomnia/Neuropathy    Continue with  Temazepam 30 mg nightly  Gabapentin 600 mg QID    Ok to continue   I will take over the temazepam writing      Tired and failed  Nortriptyline  Trazodone      CAD/afib  Last echo done 22   Summary   -- The left ventricular systolic function is moderately reduced with an   ejection fraction of 30-35 %. Moderate global hypokinesis with

## 2024-02-13 ENCOUNTER — HOSPITAL ENCOUNTER (OUTPATIENT)
Age: 74
Discharge: HOME | End: 2024-02-13
Payer: MEDICARE

## 2024-02-13 ENCOUNTER — OFFICE VISIT (OUTPATIENT)
Dept: INTERNAL MEDICINE CLINIC | Age: 74
End: 2024-02-13
Payer: MEDICARE

## 2024-02-13 VITALS
BODY MASS INDEX: 30.84 KG/M2 | DIASTOLIC BLOOD PRESSURE: 72 MMHG | HEART RATE: 112 BPM | HEIGHT: 75 IN | OXYGEN SATURATION: 97 % | TEMPERATURE: 97.4 F | WEIGHT: 248 LBS | SYSTOLIC BLOOD PRESSURE: 116 MMHG

## 2024-02-13 DIAGNOSIS — F41.1 GAD (GENERALIZED ANXIETY DISORDER): ICD-10-CM

## 2024-02-13 DIAGNOSIS — G89.4 CHRONIC PAIN SYNDROME: ICD-10-CM

## 2024-02-13 DIAGNOSIS — G47.33 OSA (OBSTRUCTIVE SLEEP APNEA): Primary | ICD-10-CM

## 2024-02-13 DIAGNOSIS — Z51.81 THERAPEUTIC DRUG MONITORING: ICD-10-CM

## 2024-02-13 DIAGNOSIS — G47.00 INSOMNIA, UNSPECIFIED TYPE: ICD-10-CM

## 2024-02-13 DIAGNOSIS — I48.0 PAROXYSMAL ATRIAL FIBRILLATION (HCC): ICD-10-CM

## 2024-02-13 DIAGNOSIS — Z00.00 WELLNESS EXAMINATION: ICD-10-CM

## 2024-02-13 DIAGNOSIS — I25.10 CORONARY ARTERY DISEASE DUE TO LIPID RICH PLAQUE: ICD-10-CM

## 2024-02-13 DIAGNOSIS — I50.9 CHRONIC CONGESTIVE HEART FAILURE, UNSPECIFIED HEART FAILURE TYPE (HCC): ICD-10-CM

## 2024-02-13 DIAGNOSIS — I25.83 CORONARY ARTERY DISEASE DUE TO LIPID RICH PLAQUE: ICD-10-CM

## 2024-02-13 PROCEDURE — 99204 OFFICE O/P NEW MOD 45 MIN: CPT | Performed by: STUDENT IN AN ORGANIZED HEALTH CARE EDUCATION/TRAINING PROGRAM

## 2024-02-13 PROCEDURE — 36415 COLL VENOUS BLD VENIPUNCTURE: CPT

## 2024-02-13 PROCEDURE — 3017F COLORECTAL CA SCREEN DOC REV: CPT | Performed by: STUDENT IN AN ORGANIZED HEALTH CARE EDUCATION/TRAINING PROGRAM

## 2024-02-13 PROCEDURE — G8427 DOCREV CUR MEDS BY ELIG CLIN: HCPCS | Performed by: STUDENT IN AN ORGANIZED HEALTH CARE EDUCATION/TRAINING PROGRAM

## 2024-02-13 PROCEDURE — G8484 FLU IMMUNIZE NO ADMIN: HCPCS | Performed by: STUDENT IN AN ORGANIZED HEALTH CARE EDUCATION/TRAINING PROGRAM

## 2024-02-13 PROCEDURE — 85025 COMPLETE CBC W/AUTO DIFF WBC: CPT

## 2024-02-13 PROCEDURE — 83036 HEMOGLOBIN GLYCOSYLATED A1C: CPT

## 2024-02-13 PROCEDURE — G8417 CALC BMI ABV UP PARAM F/U: HCPCS | Performed by: STUDENT IN AN ORGANIZED HEALTH CARE EDUCATION/TRAINING PROGRAM

## 2024-02-13 PROCEDURE — 1123F ACP DISCUSS/DSCN MKR DOCD: CPT | Performed by: STUDENT IN AN ORGANIZED HEALTH CARE EDUCATION/TRAINING PROGRAM

## 2024-02-13 PROCEDURE — 80053 COMPREHEN METABOLIC PANEL: CPT

## 2024-02-13 PROCEDURE — 80307 DRUG TEST PRSMV CHEM ANLYZR: CPT

## 2024-02-13 PROCEDURE — 1036F TOBACCO NON-USER: CPT | Performed by: STUDENT IN AN ORGANIZED HEALTH CARE EDUCATION/TRAINING PROGRAM

## 2024-02-13 RX ORDER — ALPRAZOLAM 0.5 MG/1
0.25 TABLET ORAL 2 TIMES DAILY PRN
Qty: 60 TABLET | Refills: 0 | Status: SHIPPED | OUTPATIENT
Start: 2024-02-13 | End: 2024-04-13

## 2024-02-13 NOTE — PROGRESS NOTES
07/31/2023       Health Maintenance   Topic Date Due    Hepatitis C screen  Never done    Colorectal Cancer Screen  Never done    Shingles vaccine (1 of 2) Never done    Respiratory Syncytial Virus (RSV) Pregnant or age 60 yrs+ (1 - 1-dose 60+ series) Never done    Flu vaccine (1) Never done    COVID-19 Vaccine (4 - 2023-24 season) 09/01/2023    Annual Wellness Visit (Medicare Advantage)  Never done    Depression Screen  12/01/2024    Lipids  09/19/2028    DTaP/Tdap/Td vaccine (2 - Td or Tdap) 07/31/2033    Pneumococcal 65+ years Vaccine  Completed    Hepatitis A vaccine  Aged Out    Hepatitis B vaccine  Aged Out    Hib vaccine  Aged Out    Polio vaccine  Aged Out    Meningococcal (ACWY) vaccine  Aged Out       PSH, PMH, SH and FH reviewed and noted.  Recent and past labs, tests and consults also reviewed.  Recent or new meds also reviewed.    Matthew Yanes  I reviewed with the resident the medical history and the resident's findings on the physical examination.  I discussed with the resident the patient's diagnosis and concur with the plan.   HENRRY SERRANO MD    This dictation was generated by voice recognition computer software.  Although all attempts are made to edit the dictation for accuracy, there may be errors in the transcription that are not intended.

## 2024-02-14 LAB
ALBUMIN SERPL-MCNC: 4.4 G/DL (ref 3.4–5)
ALBUMIN/GLOB SERPL: 1.4 {RATIO} (ref 1.1–2.2)
ALP SERPL-CCNC: 49 U/L (ref 40–129)
ALT SERPL-CCNC: 18 U/L (ref 10–40)
AMPHETAMINES UR QL SCN>1000 NG/ML: ABNORMAL
ANION GAP SERPL CALCULATED.3IONS-SCNC: 11 MMOL/L (ref 3–16)
AST SERPL-CCNC: 24 U/L (ref 15–37)
BARBITURATES UR QL SCN>200 NG/ML: ABNORMAL
BASOPHILS # BLD: 0.1 K/UL (ref 0–0.2)
BASOPHILS NFR BLD: 1 %
BENZODIAZ UR QL SCN>200 NG/ML: POSITIVE
BILIRUB SERPL-MCNC: 0.4 MG/DL (ref 0–1)
BUN SERPL-MCNC: 20 MG/DL (ref 7–20)
CALCIUM SERPL-MCNC: 9.5 MG/DL (ref 8.3–10.6)
CANNABINOIDS UR QL SCN>50 NG/ML: ABNORMAL
CHLORIDE SERPL-SCNC: 104 MMOL/L (ref 99–110)
CO2 SERPL-SCNC: 25 MMOL/L (ref 21–32)
COCAINE UR QL SCN: ABNORMAL
CREAT SERPL-MCNC: 1.1 MG/DL (ref 0.8–1.3)
DEPRECATED RDW RBC AUTO: 13.5 % (ref 12.4–15.4)
DRUG SCREEN COMMENT UR-IMP: ABNORMAL
EOSINOPHIL # BLD: 0.2 K/UL (ref 0–0.6)
EOSINOPHIL NFR BLD: 2.5 %
EST. AVERAGE GLUCOSE BLD GHB EST-MCNC: 111.2 MG/DL
FENTANYL SCREEN, URINE: ABNORMAL
GFR SERPLBLD CREATININE-BSD FMLA CKD-EPI: >60 ML/MIN/{1.73_M2}
GLUCOSE SERPL-MCNC: 98 MG/DL (ref 70–99)
HBA1C MFR BLD: 5.5 %
HCT VFR BLD AUTO: 42.3 % (ref 40.5–52.5)
HGB BLD-MCNC: 14.3 G/DL (ref 13.5–17.5)
LYMPHOCYTES # BLD: 2.1 K/UL (ref 1–5.1)
LYMPHOCYTES NFR BLD: 34.7 %
MCH RBC QN AUTO: 32.8 PG (ref 26–34)
MCHC RBC AUTO-ENTMCNC: 33.9 G/DL (ref 31–36)
MCV RBC AUTO: 96.7 FL (ref 80–100)
METHADONE UR QL SCN>300 NG/ML: ABNORMAL
MONOCYTES # BLD: 0.5 K/UL (ref 0–1.3)
MONOCYTES NFR BLD: 7.9 %
NEUTROPHILS # BLD: 3.3 K/UL (ref 1.7–7.7)
NEUTROPHILS NFR BLD: 53.9 %
OPIATES UR QL SCN>300 NG/ML: ABNORMAL
OXYCODONE UR QL SCN: ABNORMAL
PCP UR QL SCN>25 NG/ML: ABNORMAL
PH UR STRIP: 5 [PH]
PLATELET # BLD AUTO: 182 K/UL (ref 135–450)
PMV BLD AUTO: 8.1 FL (ref 5–10.5)
POTASSIUM SERPL-SCNC: 5 MMOL/L (ref 3.5–5.1)
PROT SERPL-MCNC: 7.6 G/DL (ref 6.4–8.2)
RBC # BLD AUTO: 4.37 M/UL (ref 4.2–5.9)
SODIUM SERPL-SCNC: 140 MMOL/L (ref 136–145)
WBC # BLD AUTO: 6.1 K/UL (ref 4–11)

## 2024-02-15 ENCOUNTER — TELEPHONE (OUTPATIENT)
Dept: CARDIOLOGY CLINIC | Age: 74
End: 2024-02-15

## 2024-02-15 DIAGNOSIS — I77.819 DILATATION OF AORTA (HCC): Primary | ICD-10-CM

## 2024-02-15 NOTE — TELEPHONE ENCOUNTER
CS asked if the pt can get their CT with contrast. If so can the order be changed to with contrast. CT scan with contrast was scheduled 2.22.2024

## 2024-02-22 ENCOUNTER — HOSPITAL ENCOUNTER (OUTPATIENT)
Dept: CT IMAGING | Age: 74
Discharge: HOME | End: 2024-02-22
Attending: INTERNAL MEDICINE
Payer: MEDICARE

## 2024-02-22 DIAGNOSIS — I77.819 DILATATION OF AORTA (HCC): ICD-10-CM

## 2024-02-22 PROCEDURE — 6360000004 HC RX CONTRAST MEDICATION: Performed by: INTERNAL MEDICINE

## 2024-02-22 PROCEDURE — 71260 CT THORAX DX C+: CPT

## 2024-02-22 RX ADMIN — IOPAMIDOL 75 ML: 755 INJECTION, SOLUTION INTRAVENOUS at 13:12

## 2024-02-26 ENCOUNTER — TELEPHONE (OUTPATIENT)
Dept: CARDIOLOGY CLINIC | Age: 74
End: 2024-02-26

## 2024-02-26 DIAGNOSIS — I77.810 ASCENDING AORTA DILATATION (HCC): Primary | ICD-10-CM

## 2024-02-26 NOTE — TELEPHONE ENCOUNTER
----- Message from Srini Grigsby MD sent at 2/26/2024  6:46 AM EST -----  Please let patient know that on the CT scan, the largest part of ascending aorta remains at 4.9 cm unchanged from previous CT scan.  While this is reassuring, given the fact that the size of the aorta is quite close to needing surgical intervention when it reaches 5.5 cm, we should continue to do CT scan every 6 months for now but we do not have to use contrast every time.  Please schedule CT chest without contrast for ascending aorta sizing and evaluation in September 2024.

## 2024-03-01 ENCOUNTER — TELEPHONE (OUTPATIENT)
Dept: PULMONOLOGY | Age: 74
End: 2024-03-01

## 2024-03-01 NOTE — TELEPHONE ENCOUNTER
Patient would like a call back ,pt state he hasn't done a sleep study that no one call him,and he have a schedule f/u appt for 3/12 and need some advise of what need to be done.

## 2024-03-11 DIAGNOSIS — F51.01 PRIMARY INSOMNIA: ICD-10-CM

## 2024-03-11 RX ORDER — ALPRAZOLAM 0.25 MG/1
TABLET ORAL
Qty: 30 TABLET | OUTPATIENT
Start: 2024-03-11

## 2024-03-11 NOTE — TELEPHONE ENCOUNTER
3/11 - 11am - PM was made aware that this pt has seen 2 PCPs since December 2023 and now has a new pt appt with Dr. Mendoza on 3/15/24. PM called pt to make sure he was aware that each time he made these new patient appointments, he was actually establishing care with a new pcp. Pt proceeded to state the doctors wont give him the pain medication that he wants. PM asked pt if he has seen a pain management doctor for this medication, he said they would not take him on as a patient. Unsure as to why. PM proceeded to inform pt that theres no guarantee that Dr. Mendoza is going to give him the medication he wants, especially since Dr. Vázquez didn't and he's in he same practice.   Pt stated he is aware and wants to proceed with appt on Friday 3/15 with Dr. Mendoza.

## 2024-03-12 NOTE — TELEPHONE ENCOUNTER
3/12 after speaking with Dr. Mendoza - PM thinks its best to dismiss this patient from our practice - PM will contact pt - no letter will be sent to pt since he's not currently our active pt

## 2024-03-13 ENCOUNTER — ENROLLMENT (OUTPATIENT)
Dept: PHARMACY | Facility: CLINIC | Age: 74
End: 2024-03-13

## 2024-03-25 DIAGNOSIS — F51.01 PRIMARY INSOMNIA: ICD-10-CM

## 2024-03-25 RX ORDER — GABAPENTIN 600 MG/1
TABLET ORAL
Qty: 90 TABLET | Refills: 3 | OUTPATIENT
Start: 2024-03-25

## 2024-03-27 ENCOUNTER — OFFICE VISIT (OUTPATIENT)
Dept: PULMONOLOGY | Age: 74
End: 2024-03-27
Payer: MEDICARE

## 2024-03-27 ENCOUNTER — HOSPITAL ENCOUNTER (OUTPATIENT)
Age: 74
Discharge: HOME | End: 2024-03-27
Payer: MEDICARE

## 2024-03-27 VITALS
SYSTOLIC BLOOD PRESSURE: 127 MMHG | HEART RATE: 78 BPM | OXYGEN SATURATION: 96 % | RESPIRATION RATE: 16 BRPM | DIASTOLIC BLOOD PRESSURE: 81 MMHG | TEMPERATURE: 97 F | WEIGHT: 240 LBS | HEIGHT: 75 IN | BODY MASS INDEX: 29.84 KG/M2

## 2024-03-27 DIAGNOSIS — G47.31 CSA (CENTRAL SLEEP APNEA): Primary | ICD-10-CM

## 2024-03-27 DIAGNOSIS — G47.00 INSOMNIA, UNSPECIFIED TYPE: ICD-10-CM

## 2024-03-27 DIAGNOSIS — G89.4 CHRONIC PAIN SYNDROME: ICD-10-CM

## 2024-03-27 DIAGNOSIS — I25.118 CORONARY ARTERY DISEASE INVOLVING NATIVE CORONARY ARTERY OF NATIVE HEART WITH OTHER FORM OF ANGINA PECTORIS (HCC): ICD-10-CM

## 2024-03-27 DIAGNOSIS — G47.33 OSA (OBSTRUCTIVE SLEEP APNEA): ICD-10-CM

## 2024-03-27 LAB
CHOLEST SERPL-MCNC: 185 MG/DL (ref 0–199)
HDLC SERPL-MCNC: 54 MG/DL (ref 40–60)
LDLC SERPL CALC-MCNC: 108 MG/DL
TRIGL SERPL-MCNC: 116 MG/DL (ref 0–150)
VLDLC SERPL CALC-MCNC: 23 MG/DL

## 2024-03-27 PROCEDURE — 80061 LIPID PANEL: CPT

## 2024-03-27 PROCEDURE — 99214 OFFICE O/P EST MOD 30 MIN: CPT | Performed by: INTERNAL MEDICINE

## 2024-03-27 PROCEDURE — 36415 COLL VENOUS BLD VENIPUNCTURE: CPT

## 2024-03-27 PROCEDURE — 1123F ACP DISCUSS/DSCN MKR DOCD: CPT | Performed by: INTERNAL MEDICINE

## 2024-03-27 RX ORDER — TEMAZEPAM 30 MG/1
30 CAPSULE ORAL NIGHTLY PRN
Qty: 30 CAPSULE | Refills: 2 | Status: SHIPPED | OUTPATIENT
Start: 2024-04-12 | End: 2024-05-12

## 2024-03-27 ASSESSMENT — ENCOUNTER SYMPTOMS
GASTROINTESTINAL NEGATIVE: 1
RESPIRATORY NEGATIVE: 1
ALLERGIC/IMMUNOLOGIC NEGATIVE: 1
EYES NEGATIVE: 1

## 2024-03-27 NOTE — PATIENT INSTRUCTIONS
Diagnosis Orders   1. CSA (central sleep apnea)        2. JHONY (obstructive sleep apnea)        3. Insomnia, unspecified type        4. Chronic pain syndrome                RECOMMENDATIONS:       Advised to avoid driving when too sleepy to function safely and given a discussion of the risks of untreated apnea such as accidents, cognitive impairment, mood impairment, high blood pressure, various cardiac diseases and stroke.              12/15/2023    10:35 AM   Sleep Medicine   Sitting and reading 0   Watching TV 0   Sitting, inactive in a public place (e.g. a theatre or a meeting) 0   As a passenger in a car for an hour without a break 0   Lying down to rest in the afternoon when circumstances permit 3   Sitting and talking to someone 0   Sitting quietly after a lunch without alcohol 1   In a car, while stopped for a few minutes in traffic 0   [place] Sleepiness Score 4   Neck circumference (Inches) 16.5           Neck circumference (Inches): 16.5  Mallampati class 2        Will get Sleep study  I will call you with results    Will do inlab study  With gabapentin and temazepam  And will r/o central sleep apnea and obstructive sleep apnea  Patient is already delayed this several times in the past, will set up a sleep study for June 2024 and then have him follow-up with Dr. Leung afterwards      Insomnia/Neuropathy    Continue with  Temazepam 30 mg nightly  Gabapentin 600 mg QID    Ok to continue   I will take over the temazepam writing  Overall the patient has been doing well with the temazepam using it about 2-5 times a week and not having any issues of addiction  But awaiting the sleep study to rule out other sleep disorders    Tired and failed  Nortriptyline  Trazodone      CAD/afib  Last echo done 12/22/22   Summary   -- The left ventricular systolic function is moderately reduced with an   ejection fraction of 30-35 %. Moderate global hypokinesis with regional   variation. Left ventricular diastolic filling

## 2024-03-27 NOTE — PROGRESS NOTES
Hieu Edwards (: 1950 ) is a 73 y.o. male here for an evaluation of   Chief Complaint   Patient presents with    Follow-up     Sleep questions         ASSESSMENT/PLAN:   Diagnosis Orders   1. CSA (central sleep apnea)        2. JHONY (obstructive sleep apnea)        3. Insomnia, unspecified type        4. Chronic pain syndrome                RECOMMENDATIONS:       Advised to avoid driving when too sleepy to function safely and given a discussion of the risks of untreated apnea such as accidents, cognitive impairment, mood impairment, high blood pressure, various cardiac diseases and stroke.              12/15/2023    10:35 AM   Sleep Medicine   Sitting and reading 0   Watching TV 0   Sitting, inactive in a public place (e.g. a theatre or a meeting) 0   As a passenger in a car for an hour without a break 0   Lying down to rest in the afternoon when circumstances permit 3   Sitting and talking to someone 0   Sitting quietly after a lunch without alcohol 1   In a car, while stopped for a few minutes in traffic 0   [place] Sleepiness Score 4   Neck circumference (Inches) 16.5           Neck circumference (Inches): 16.5  Mallampati class 2        Will get Sleep study  I will call you with results    Will do inlab study  With gabapentin and temazepam  And will r/o central sleep apnea and obstructive sleep apnea  Patient is already delayed this several times in the past, will set up a sleep study for 2024 and then have him follow-up with Dr. Leung afterwards      Insomnia/Neuropathy    Continue with  Temazepam 30 mg nightly  Gabapentin 600 mg QID    Ok to continue   I will take over the temazepam writing  Overall the patient has been doing well with the temazepam using it about 2-5 times a week and not having any issues of addiction  But awaiting the sleep study to rule out other sleep disorders    Tired and failed  Nortriptyline  Trazodone      CAD/afib  Last echo done 22   Summary   -- The left

## 2024-03-27 NOTE — PROGRESS NOTES
MA Communication:  The following orders are received by verbal communication from Manuel Seymour MD    Orders include:  Home sleep study. Follow up with Dr Leung

## 2024-03-28 ENCOUNTER — TELEPHONE (OUTPATIENT)
Dept: CARDIOLOGY CLINIC | Age: 74
End: 2024-03-28

## 2024-03-28 DIAGNOSIS — E78.2 MIXED HYPERLIPIDEMIA: Primary | ICD-10-CM

## 2024-03-28 RX ORDER — INCLISIRAN 284 MG/1.5ML
284 INJECTION, SOLUTION SUBCUTANEOUS ONCE
COMMUNITY

## 2024-03-28 NOTE — TELEPHONE ENCOUNTER
----- Message from Srini Grigsby MD sent at 3/28/2024  3:02 AM EDT -----  Please let patient know that his LDL is significantly improved compared to 6 months ago but remains above goal for history of coronary artery disease.  It needs to be under 70.  I am not sure why I do not see Lipitor on his list antiviral.  Can we confirm if he is taking Lipitor 40, if he is then he needs to increase it to 80 mg daily.  If he is not on Lipitor anymore then he needs to start taking Lipitor 40.  
Okay.  Can we please add Leqvio to his medication list and have him check fasting lipid panel in 6 months please.  Thank you  
Rx added, labs ordered. Pt informed  
Spoke with pt. Relayed FXW message. Pt says he is not on lipitor due to side effects. He completed 3 session of leqvio. The last one being in January. He says he is due for his 6 month leqvio infusion in June. Please advise on any additional medication changes  
yes

## 2024-07-11 ENCOUNTER — OFFICE VISIT (OUTPATIENT)
Dept: ORTHOPEDIC SURGERY | Age: 74
End: 2024-07-11
Payer: MEDICARE

## 2024-07-11 VITALS — HEIGHT: 75 IN | WEIGHT: 240.08 LBS | BODY MASS INDEX: 29.85 KG/M2

## 2024-07-11 DIAGNOSIS — M47.816 LUMBAR SPONDYLOSIS: Primary | ICD-10-CM

## 2024-07-11 PROCEDURE — 99203 OFFICE O/P NEW LOW 30 MIN: CPT | Performed by: ORTHOPAEDIC SURGERY

## 2024-07-11 PROCEDURE — G8417 CALC BMI ABV UP PARAM F/U: HCPCS | Performed by: ORTHOPAEDIC SURGERY

## 2024-07-11 PROCEDURE — 3017F COLORECTAL CA SCREEN DOC REV: CPT | Performed by: ORTHOPAEDIC SURGERY

## 2024-07-11 PROCEDURE — G8427 DOCREV CUR MEDS BY ELIG CLIN: HCPCS | Performed by: ORTHOPAEDIC SURGERY

## 2024-07-11 PROCEDURE — 1123F ACP DISCUSS/DSCN MKR DOCD: CPT | Performed by: ORTHOPAEDIC SURGERY

## 2024-07-11 PROCEDURE — 1036F TOBACCO NON-USER: CPT | Performed by: ORTHOPAEDIC SURGERY

## 2024-07-11 NOTE — PROGRESS NOTES
New Patient: LUMBAR SPINE    Referring Provider:  No ref. provider found    CHIEF COMPLAINT:    Chief Complaint   Patient presents with    New Patient     Lumbar spine  No imaging       HISTORY OF PRESENT ILLNESS:    Mr. Hieu Edwards  is a pleasant 73 y.o. male presents with a 3-year history of low back pain which she rates a 6/10.  He has been diagnosed with a peripheral neuropathy..  He denies saddle anesthesia and bowel or bladder dysfunction.      Current/Past Treatment:   Physical Therapy: No  Chiropractic: Yes  Injection: No  Medications: Gabapentin    Past Medical History:   Past Medical History:   Diagnosis Date    Atrial fibrillation (HCC)     Cardiomyopathy (HCC) 06/2020    ischemic    Dilated aortic root (HCC)     Headache     from big pain of feet -- I did already almost all possible    HFrEF (heart failure with reduced ejection fraction) (HCC)     Peripheral neuropathy       Past Surgical History:     Past Surgical History:   Procedure Laterality Date    ANGIOPLASTY      stent in LAD in 2020    CARDIOVERSION      2008     Current Medications:     Current Outpatient Medications:     Inclisiran Sodium (LEQVIO) 284 MG/1.5ML, Inject 1.5 mLs into the skin once, Disp: , Rfl:     sodium chloride (KAIA 128) 5 % ophthalmic solution, , Disp: , Rfl:     sacubitril-valsartan (ENTRESTO) 24-26 MG per tablet, Take 1 tablet by mouth 2 times daily, Disp: 60 tablet, Rfl: 0    ENTRESTO 24-26 MG per tablet, TAKE 1 TABLET BY MOUTH TWICE DAILY, Disp: 180 tablet, Rfl: 1    carvedilol (COREG) 6.25 MG tablet, TAKE 1 TABLET BY MOUTH TWICE DAILY WITH MEALS, Disp: 180 tablet, Rfl: 1    gabapentin (NEURONTIN) 600 MG tablet, Take 1 tablet by mouth 4 times daily as needed (Neuropathy pain) for up to 90 days., Disp: 90 tablet, Rfl: 3    aspirin 81 MG EC tablet, Take 1 tablet by mouth daily, Disp: 90 tablet, Rfl: 3    furosemide (LASIX) 20 MG tablet, TAKE 1 TABLET BY MOUTH DAILY (Patient taking differently: Take 1 tablet by mouth

## 2024-07-17 ENCOUNTER — HOSPITAL ENCOUNTER (OUTPATIENT)
Dept: PHYSICAL THERAPY | Age: 74
Setting detail: THERAPIES SERIES
Discharge: HOME | End: 2024-07-17
Payer: MEDICARE

## 2024-07-17 DIAGNOSIS — Z74.09 STIFFNESS DUE TO IMMOBILITY: ICD-10-CM

## 2024-07-17 DIAGNOSIS — M25.60 STIFFNESS DUE TO IMMOBILITY: ICD-10-CM

## 2024-07-17 DIAGNOSIS — M54.50 LUMBAR PAIN: Primary | ICD-10-CM

## 2024-07-17 DIAGNOSIS — R53.1 WEAKNESS: ICD-10-CM

## 2024-07-17 PROCEDURE — 97161 PT EVAL LOW COMPLEX 20 MIN: CPT

## 2024-07-17 PROCEDURE — 97110 THERAPEUTIC EXERCISES: CPT

## 2024-07-17 NOTE — PLAN OF CARE
[facility]- Outpatient [facility] Therapy  [address], [place] [address] office: [phone] fax: [phone]     Physical Therapy Initial Evaluation Certification      Dear Sebas Wilson MD,    We had the pleasure of evaluating the following patient for physical therapy services at [facility].  A summary of our findings can be found in the initial assessment below.  This includes our plan of care.  If you have any questions or concerns regarding these findings, please do not hesitate to contact me at the office phone number listed above.  Thank you for the referral.     Physician Signature:_______________________________Date:__________________  By signing above (or electronic signature), therapist’s plan is approved by physician       Physical Therapy: TREATMENT/PROGRESS NOTE   Patient: Hieu Edwards (73 y.o. male)   Examination Date: 2024   :  1950 MRN: [id number]   Visit #: 1   Insurance Allowable Auth Needed   BMN []Yes    [x]No    Insurance: Payor: [facility] MEDICARE / Plan: [facility] MEDICARE COMPLETE / Product Type: *No Product type* /   Insurance ID: [id number] - (Medicare Managed)  Secondary Insurance (if applicable): [facility] MEDICARE   Treatment Diagnosis:     ICD-10-CM    1. Lumbar pain  M54.50       2. Weakness  R53.1       3. Stiffness due to immobility  M25.60     Z74.09          Medical Diagnosis:  Lumbar spondylosis [M47.816]   Referring Physician: Sebas Wilson MD  PCP: Joel Huynh MD     Plan of care signed (Y/N):     Date of Patient follow up with Physician:      Progress Report/POC: EVAL today  POC update due: (10 visits /OR AUTH LIMITS, whichever is less)  2024                                             Precautions/ Contra-indications:           Latex allergy:  NO  Pacemaker:    NO  Contraindications for Manipulation: None  Date of Surgery: n/a  Other:    Red Flags:  None    C-SSRS Triggered by Intake

## 2024-07-25 ENCOUNTER — HOSPITAL ENCOUNTER (OUTPATIENT)
Dept: PHYSICAL THERAPY | Age: 74
Setting detail: THERAPIES SERIES
Discharge: HOME | End: 2024-07-25
Payer: MEDICARE

## 2024-07-25 PROCEDURE — 97110 THERAPEUTIC EXERCISES: CPT

## 2024-07-25 NOTE — FLOWSHEET NOTE
goals    Return to Play: NA    Prognosis for POC: [x] Good [] Fair  [] Poor    Patient requires continued skilled intervention: [x] Yes  [] No      CHARGE CAPTURE     PT CHARGE GRID   CPT Code (TIMED) minutes # CPT Code (UNTIMED) #     Therex (67592)  30 2  EVAL:LOW (62990 - Typically 20 minutes face-to-face)     Neuromusc. Re-ed (18236)    Re-Eval (60939)     Manual (90292)    Estim Unattended (37013)     Ther. Act (86578)    Mech. Traction (57530)     Gait (90305)    Dry Needle 1-2 muscle (04255)     Aquatic Therex (22340)    Dry Needle 3+ muscle (09729)     Iontophoresis (37689)    VASO (35595)     Ultrasound (39416)    Group Therapy (57386)     Estim Attended (50726)    Canalith Repositioning (29533)     Other:    Other: x8 cryotherapy    Total Timed Code Tx Minutes 30 2       Total Treatment Minutes 38        Charge Justification:  (84350) THERAPEUTIC EXERCISE - Provided verbal/tactile cueing for activities related to strengthening, flexibility, endurance, ROM performed to prevent loss of range of motion, maintain or improve muscular strength or increase flexibility, following either an injury or surgery.     GOALS     Patient stated goal: Patient would like to return to ADLs without back pain.  [] Progressing: [] Met: [] Not Met: [] Adjusted    Therapist goals for Patient:   Short Term Goals: To be achieved in: 2 weeks  1. Independent in HEP and progression per patient tolerance, in order to prevent re-injury.   [] Progressing: [] Met: [] Not Met: [] Adjusted  2. Patient will have a decrease in pain to <2/10 to facilitate improvement in movement, function, and ADLs as indicated by Functional Deficits.  [] Progressing: [] Met: [] Not Met: [] Adjusted    Long Term Goals: To be achieved in: 6-8 weeks  1. Disability index score of 10% or less for the Modified Oswestry to assist with reaching prior level of function with activities such as stair ambulation.  [] Progressing: [] Met: [] Not Met: [] Adjusted  2.

## 2024-07-31 ENCOUNTER — APPOINTMENT (OUTPATIENT)
Dept: PHYSICAL THERAPY | Age: 74
End: 2024-07-31
Payer: MEDICARE

## 2024-08-01 NOTE — PROGRESS NOTES
with PCI to LAD - no current anginal symptoms.  Still on Plavix  Cardiomyopathy with severely reduced LV function - EF 20% on diagnosis.  Most recent echo in 2022 improved to 35 to 40%.  No current overt heart failure signs or symptoms  Paroxysmal atrial fibrillation - on beta-blocker and apixaban  Aortic root/ascending aorta dilation - has been read as large as 4.9 cm at the aortic root level.  Most recent echo reading 4.5 cm and most recent CT reading 4.9 cm    PLAN:  Continue current cardiac medications eliquis 5 twice daily, start aspirin 81 daily, stop Plavix, continue coreg 6.25 twice daily, entresto 24-26 twice daily, lasix 20 daily,   Start repatha if cost is not prohibitive, patient cannot afford Leqvio  Labs now: fasting lipids  CT Chest without contrast now to assess dilated aorta  Recommend testing every 6 months given borderline size. Last test February 2024  Call [phone]   Follow up in 1 year       I, Srini Grigsby, personally performed the services described in this documentation, as scribed by Sabra Correia RN. in my presence. It is both accurate and complete to my knowledge.  I agree with the details independently gathered by the clinical support staff and the scribed note accurately describes my personal service to the patient.    Tobacco use was discussed with the patient and educated on the negative effects.    All questions and concerns were addressed to the patient/family. Alternatives to my treatment as well as risks and benefits of proposed treatment were discussed and understood by patient/family.     Srini Grigsby MD, [facility], [facility]  Interventional Cardiology  [facility]  [phone] (c)  8/1/2024      Inadvertent computerized transcription errors may be present

## 2024-08-02 ENCOUNTER — OFFICE VISIT (OUTPATIENT)
Dept: CARDIOLOGY CLINIC | Age: 74
End: 2024-08-02
Payer: MEDICARE

## 2024-08-02 VITALS
DIASTOLIC BLOOD PRESSURE: 78 MMHG | BODY MASS INDEX: 30.09 KG/M2 | HEART RATE: 106 BPM | HEIGHT: 75 IN | SYSTOLIC BLOOD PRESSURE: 118 MMHG | OXYGEN SATURATION: 98 % | WEIGHT: 242 LBS

## 2024-08-02 DIAGNOSIS — I48.91 ATRIAL FIBRILLATION WITH RAPID VENTRICULAR RESPONSE (HCC): Primary | ICD-10-CM

## 2024-08-02 DIAGNOSIS — I50.41 ACUTE COMBINED SYSTOLIC AND DIASTOLIC CONGESTIVE HEART FAILURE (HCC): ICD-10-CM

## 2024-08-02 DIAGNOSIS — E78.2 MIXED HYPERLIPIDEMIA: ICD-10-CM

## 2024-08-02 DIAGNOSIS — Z78.9 STATIN INTOLERANCE: ICD-10-CM

## 2024-08-02 DIAGNOSIS — I48.0 PAROXYSMAL ATRIAL FIBRILLATION (HCC): ICD-10-CM

## 2024-08-02 DIAGNOSIS — I25.10 CORONARY ARTERY DISEASE INVOLVING NATIVE CORONARY ARTERY OF NATIVE HEART WITHOUT ANGINA PECTORIS: ICD-10-CM

## 2024-08-02 DIAGNOSIS — I77.810 ASCENDING AORTA DILATATION (HCC): ICD-10-CM

## 2024-08-02 DIAGNOSIS — I25.5 ISCHEMIC CARDIOMYOPATHY: ICD-10-CM

## 2024-08-02 DIAGNOSIS — I77.819 DILATATION OF AORTA (HCC): ICD-10-CM

## 2024-08-02 DIAGNOSIS — I25.118 CORONARY ARTERY DISEASE INVOLVING NATIVE CORONARY ARTERY OF NATIVE HEART WITH OTHER FORM OF ANGINA PECTORIS (HCC): ICD-10-CM

## 2024-08-02 PROCEDURE — 1036F TOBACCO NON-USER: CPT | Performed by: INTERNAL MEDICINE

## 2024-08-02 PROCEDURE — 99214 OFFICE O/P EST MOD 30 MIN: CPT | Performed by: INTERNAL MEDICINE

## 2024-08-02 PROCEDURE — G8417 CALC BMI ABV UP PARAM F/U: HCPCS | Performed by: INTERNAL MEDICINE

## 2024-08-02 PROCEDURE — 1123F ACP DISCUSS/DSCN MKR DOCD: CPT | Performed by: INTERNAL MEDICINE

## 2024-08-02 PROCEDURE — 3017F COLORECTAL CA SCREEN DOC REV: CPT | Performed by: INTERNAL MEDICINE

## 2024-08-02 PROCEDURE — G8427 DOCREV CUR MEDS BY ELIG CLIN: HCPCS | Performed by: INTERNAL MEDICINE

## 2024-08-02 RX ORDER — SACUBITRIL AND VALSARTAN 24; 26 MG/1; MG/1
1 TABLET, FILM COATED ORAL 2 TIMES DAILY
Qty: 180 TABLET | Refills: 3 | Status: SHIPPED | OUTPATIENT
Start: 2024-08-02

## 2024-08-02 RX ORDER — TEMAZEPAM 30 MG/1
30 CAPSULE ORAL NIGHTLY PRN
COMMUNITY
Start: 2024-07-22 | End: 2024-08-12 | Stop reason: SDUPTHER

## 2024-08-02 RX ORDER — ASPIRIN 81 MG/1
81 TABLET ORAL DAILY
Qty: 90 TABLET | Refills: 3 | Status: SHIPPED | OUTPATIENT
Start: 2024-08-02

## 2024-08-02 RX ORDER — EVOLOCUMAB 140 MG/ML
140 INJECTION, SOLUTION SUBCUTANEOUS
Qty: 2.1 ML | Refills: 11 | Status: SHIPPED | OUTPATIENT
Start: 2024-08-02

## 2024-08-02 NOTE — PATIENT INSTRUCTIONS
[phone] office number    Your provider has ordered testing for further evaluation.  An order/prescription has been included in your paper work.   To schedule outpatient testing, contact Central Scheduling by calling [address] [place] [address] ([phone]).    PLAN:  Continue current cardiac medications eliquis 5 twice daily, aspirin 81 daily, coreg 6.25 twice daily, entresto 24-26 twice daily, lasix 20 daily,   Start repatha an injectable cholesterol medication  Labs now: fasting lipids  CT Chest without contrast now to assess dilated aorta  Recommend testing every 6 months  Call [phone]   Follow up in 1 year

## 2024-08-05 ENCOUNTER — TELEPHONE (OUTPATIENT)
Dept: ORTHOPEDIC SURGERY | Age: 74
End: 2024-08-05

## 2024-08-05 NOTE — TELEPHONE ENCOUNTER
Patient stopped into therapy and is asking if he have a note for jury duty.   \"With his back it is unsuitable or even possible to sit diligently quiet on the chair for several hours.\"

## 2024-08-07 ENCOUNTER — TELEPHONE (OUTPATIENT)
Dept: INTERNAL MEDICINE CLINIC | Age: 74
End: 2024-08-07

## 2024-08-07 ENCOUNTER — HOSPITAL ENCOUNTER (OUTPATIENT)
Dept: PHYSICAL THERAPY | Age: 74
Setting detail: THERAPIES SERIES
Discharge: HOME | End: 2024-08-07
Payer: MEDICARE

## 2024-08-07 PROCEDURE — 97110 THERAPEUTIC EXERCISES: CPT

## 2024-08-07 RX ORDER — TEMAZEPAM 30 MG
CAPSULE ORAL
Qty: 30 CAPSULE | OUTPATIENT
Start: 2024-08-07

## 2024-08-07 NOTE — FLOWSHEET NOTE
Adjusted  2. Patient will demonstrate increased AROM of lumbar spine to WFL without pain to allow for proper joint functioning to enable patient to complete prolonged sitting.   [] Progressing: [] Met: [] Not Met: [] Adjusted  3. Patient will demonstrate increased Strength of lumbar spine to at least 4+/5 throughout without pain to allow for proper functional mobility to enable patient to return to sit to stands.   [] Progressing: [] Met: [] Not Met: [] Adjusted  4. Patient will return to sleep without increased symptoms or restriction.   [] Progressing: [] Met: [] Not Met: [] Adjusted  5. Patient would like to return to job without pain. (patient specific functional goal)    [] Progressing: [] Met: [] Not Met: [] Adjusted     Overall Progression Towards Functional goals/ Treatment Progress Update:  [] Patient is progressing as expected towards functional goals listed.    [] Progression is slowed due to complexities/Impairments listed.  [] Progression has been slowed due to co-morbidities.  [x] Plan just implemented, too soon (<30days) to assess goals progression   [] Goals require adjustment due to lack of progress  [] Patient is not progressing as expected and requires additional follow up with physician  [] Other:     TREATMENT PLAN     Frequency/Duration: 1-2x/week for  6-8  weeks for the following treatment interventions:    Interventions:  Therapeutic Exercise (98621) including: strength training, ROM, and functional mobility  Therapeutic Activities (04718) including: functional mobility training and education.  Neuromuscular Re-education (57031) activation and proprioception, including postural re-education.    Gait Training (06418) for normalization of ambulation patterns and AD training.   Manual Therapy (35132) as indicated to include: Passive Range of Motion, Gr I-IV mobilizations, Soft Tissue Mobilization, and Dry Needling/IASTM  Modalities as needed that may include: Cryotherapy and Electrical

## 2024-08-12 ENCOUNTER — OFFICE VISIT (OUTPATIENT)
Dept: PULMONOLOGY | Age: 74
End: 2024-08-12
Payer: MEDICARE

## 2024-08-12 VITALS
BODY MASS INDEX: 30.2 KG/M2 | TEMPERATURE: 97 F | RESPIRATION RATE: 18 BRPM | DIASTOLIC BLOOD PRESSURE: 81 MMHG | WEIGHT: 248 LBS | HEIGHT: 76 IN | OXYGEN SATURATION: 96 % | SYSTOLIC BLOOD PRESSURE: 121 MMHG | HEART RATE: 105 BPM

## 2024-08-12 DIAGNOSIS — G47.00 INSOMNIA, UNSPECIFIED TYPE: Primary | ICD-10-CM

## 2024-08-12 PROCEDURE — 1123F ACP DISCUSS/DSCN MKR DOCD: CPT | Performed by: INTERNAL MEDICINE

## 2024-08-12 PROCEDURE — 99214 OFFICE O/P EST MOD 30 MIN: CPT | Performed by: INTERNAL MEDICINE

## 2024-08-12 RX ORDER — TEMAZEPAM 30 MG/1
30 CAPSULE ORAL NIGHTLY PRN
Qty: 30 CAPSULE | Refills: 2 | Status: SHIPPED | OUTPATIENT
Start: 2024-08-12 | End: 2024-11-10

## 2024-08-12 ASSESSMENT — SLEEP AND FATIGUE QUESTIONNAIRES
HOW LIKELY ARE YOU TO NOD OFF OR FALL ASLEEP WHILE WATCHING TV: WOULD NEVER DOZE
ESS TOTAL SCORE: 4
HOW LIKELY ARE YOU TO NOD OFF OR FALL ASLEEP WHILE SITTING INACTIVE IN A PUBLIC PLACE: WOULD NEVER DOZE
HOW LIKELY ARE YOU TO NOD OFF OR FALL ASLEEP IN A CAR, WHILE STOPPED FOR A FEW MINUTES IN TRAFFIC: WOULD NEVER DOZE
HOW LIKELY ARE YOU TO NOD OFF OR FALL ASLEEP WHILE SITTING AND READING: SLIGHT CHANCE OF DOZING
HOW LIKELY ARE YOU TO NOD OFF OR FALL ASLEEP WHILE SITTING AND TALKING TO SOMEONE: WOULD NEVER DOZE
HOW LIKELY ARE YOU TO NOD OFF OR FALL ASLEEP WHILE SITTING QUIETLY AFTER LUNCH WITHOUT ALCOHOL: WOULD NEVER DOZE
HOW LIKELY ARE YOU TO NOD OFF OR FALL ASLEEP WHEN YOU ARE A PASSENGER IN A CAR FOR AN HOUR WITHOUT A BREAK: WOULD NEVER DOZE

## 2024-08-12 NOTE — PATIENT INSTRUCTIONS
Remember to bring a list of pulmonary medications and any CPAP or BiPAP machines to your next appointment with the office.     Please keep all of your future appointments scheduled by [facility] office. Out of respect for other patients and providers, you may be asked to reschedule your appointment if you arrive later than your scheduled appointment time. Appointments cancelled less than 24hrs in advance will be considered a no show. Patients with three missed appointments within 1 year or four missed appointments within 2 years can be dismissed from the practice.     Please be aware that our physicians are required to work in the Intensive Care Unit at [facility].  Your appointment may need to be rescheduled if they are designated to work during your appointment time.      You may receive a survey regarding the care you received during your visit.  Your input is valuable to us.  We encourage you to complete and return your survey.  We hope you will choose us in the future for your healthcare needs.     Pt instructed of all future appointment dates & times, including radiology, labs, procedures & referrals. If procedures were scheduled preparation instructions provided. Instructions on future appointments with [facility] were given.      In the next few weeks, you will be receiving a survey from [facility] regarding your visit today.  We would greatly appreciate it if you would take just a few minutes to fill that out.  It is very important to us that our patients receive top notch care and our surveys help keep us accountable. However, if your experience was not a good one, we want to hear about that as well. This is a key way we can keep track of problems and strive to correct any for future visits.    Again, we appreciate your time and thank you for choosing [facility]!    PATRICIA Mccullough

## 2024-08-12 NOTE — PROGRESS NOTES
MA Communication:  The following orders are received by verbal communication from Christopher Leung MD    Orders include:      follow up in 90 days VV is ok    
  Cardiovascular: Normal S1S2.  No lower extremity edema.  Pulmonary/Chest: Clear breath sounds.  No accessory muscle usage.   Musculoskeletal: No cyanosis. No clubbing.  Skin: Skin is warm and dry.   Psychiatric: Normal mood and affect.    DATA:    Cardiac  Echo 5/10/2022 EF 35 to 40%, aortic root dilation    Labs: Creatinine 1.1, hemoglobin 14.3    ASSESSMENT:  Insomnia, sleep onset  Ischemic cardiomyopathy  CAD s/p PCI 2020  Paroxysmal atrial fibrillation on apixaban  Neuropathy on gabapentin 600 QID    PLAN:   Temazepam 30 at bedtime as needed, longstanding medication.  Tried and failed nortriptyline and trazodone.  Med agreement 8/12/2024, specific medication consent 8/12/2024  PDMP reviewed today 8/12/2024  F/U less than 90 days

## 2024-08-14 ENCOUNTER — TELEPHONE (OUTPATIENT)
Dept: CARDIOLOGY CLINIC | Age: 74
End: 2024-08-14

## 2024-08-14 NOTE — TELEPHONE ENCOUNTER
[facility] called to see if pt has completed lipid panel for his repalistair muro. Advised that pt has not. They will reach out to pt.

## 2024-08-21 ENCOUNTER — APPOINTMENT (OUTPATIENT)
Dept: PHYSICAL THERAPY | Age: 74
End: 2024-08-21
Payer: MEDICARE

## 2024-08-29 ENCOUNTER — HOSPITAL ENCOUNTER (OUTPATIENT)
Age: 74
Discharge: HOME | End: 2024-08-29
Payer: MEDICARE

## 2024-08-29 DIAGNOSIS — E78.2 MIXED HYPERLIPIDEMIA: ICD-10-CM

## 2024-08-29 LAB
CHOLEST SERPL-MCNC: 184 MG/DL (ref 0–199)
HDLC SERPL-MCNC: 42 MG/DL (ref 40–60)
LDLC SERPL CALC-MCNC: 114 MG/DL
TRIGL SERPL-MCNC: 142 MG/DL (ref 0–150)
VLDLC SERPL CALC-MCNC: 28 MG/DL

## 2024-08-29 PROCEDURE — 36415 COLL VENOUS BLD VENIPUNCTURE: CPT

## 2024-08-29 PROCEDURE — 80061 LIPID PANEL: CPT

## 2024-09-03 DIAGNOSIS — E78.2 MIXED HYPERLIPIDEMIA: ICD-10-CM

## 2024-09-03 DIAGNOSIS — I25.118 CORONARY ARTERY DISEASE INVOLVING NATIVE CORONARY ARTERY OF NATIVE HEART WITH OTHER FORM OF ANGINA PECTORIS (HCC): ICD-10-CM

## 2024-09-03 DIAGNOSIS — Z78.9 STATIN INTOLERANCE: ICD-10-CM

## 2024-09-03 RX ORDER — EVOLOCUMAB 140 MG/ML
140 INJECTION, SOLUTION SUBCUTANEOUS
Qty: 2 EACH | Refills: 11 | Status: SHIPPED | OUTPATIENT
Start: 2024-09-03

## 2024-09-04 ENCOUNTER — HOSPITAL ENCOUNTER (OUTPATIENT)
Dept: PHYSICAL THERAPY | Age: 74
Setting detail: THERAPIES SERIES
Discharge: HOME | End: 2024-09-04
Payer: MEDICARE

## 2024-09-04 PROCEDURE — 97110 THERAPEUTIC EXERCISES: CPT

## 2024-09-04 PROCEDURE — 97112 NEUROMUSCULAR REEDUCATION: CPT

## 2024-09-04 NOTE — PLAN OF CARE
(51780)    Canalith Repositioning (27746)     Other:    Other: x8 cryotherapy    Total Timed Code Tx Minutes 30 2       Total Treatment Minutes 38        Charge Justification:  (62060) THERAPEUTIC EXERCISE - Provided verbal/tactile cueing for activities related to strengthening, flexibility, endurance, ROM performed to prevent loss of range of motion, maintain or improve muscular strength or increase flexibility, following either an injury or surgery.   (00660) NEUROMUSCULAR RE-EDUCATION - Therapeutic procedure, 1 or more areas, each 15 minutes; neuromuscular reeducation of movement, balance, coordination, kinesthetic sense, posture, and/or proprioception for sitting and/or standing activities    GOALS     Patient stated goal: Patient would like to return to ADLs without back pain.  [] Progressing: [] Met: [] Not Met: [] Adjusted    Therapist goals for Patient:   Short Term Goals: To be achieved in: 2 weeks  1. Independent in HEP and progression per patient tolerance, in order to prevent re-injury.   [x] Progressing: [x] Met: [] Not Met: [] Adjusted  2. Patient will have a decrease in pain to <2/10 to facilitate improvement in movement, function, and ADLs as indicated by Functional Deficits.  [x] Progressing: [x] Met: [] Not Met: [] Adjusted    Long Term Goals: To be achieved in: 6-8 weeks  1. Disability index score of 10% or less for the Modified Oswestry to assist with reaching prior level of function with activities such as stair ambulation.  [x] Progressing: [x] Met: [] Not Met: [] Adjusted  2. Patient will demonstrate increased AROM of lumbar spine to WFL without pain to allow for proper joint functioning to enable patient to complete prolonged sitting.   [x] Progressing: [x] Met: [] Not Met: [] Adjusted  3. Patient will demonstrate increased Strength of lumbar spine to at least 4+/5 throughout without pain to allow for proper functional mobility to enable patient to return to sit to stands.   [x] Progressing:

## 2024-09-06 DIAGNOSIS — I50.41 ACUTE COMBINED SYSTOLIC AND DIASTOLIC CONGESTIVE HEART FAILURE (HCC): ICD-10-CM

## 2024-09-06 RX ORDER — FUROSEMIDE 20 MG
20 TABLET ORAL DAILY
Qty: 90 TABLET | Refills: 3 | Status: SHIPPED | OUTPATIENT
Start: 2024-09-06

## 2024-10-23 ENCOUNTER — TELEMEDICINE (OUTPATIENT)
Dept: PULMONOLOGY | Age: 74
End: 2024-10-23
Payer: MEDICARE

## 2024-10-23 DIAGNOSIS — G47.00 INSOMNIA, UNSPECIFIED TYPE: ICD-10-CM

## 2024-10-23 PROCEDURE — 1123F ACP DISCUSS/DSCN MKR DOCD: CPT | Performed by: INTERNAL MEDICINE

## 2024-10-23 PROCEDURE — 99213 OFFICE O/P EST LOW 20 MIN: CPT | Performed by: INTERNAL MEDICINE

## 2024-10-23 PROCEDURE — 1159F MED LIST DOCD IN RCRD: CPT | Performed by: INTERNAL MEDICINE

## 2024-10-23 RX ORDER — TEMAZEPAM 30 MG/1
30 CAPSULE ORAL NIGHTLY PRN
Qty: 30 CAPSULE | Refills: 2 | Status: SHIPPED | OUTPATIENT
Start: 2024-10-23 | End: 2025-01-21

## 2024-10-23 ASSESSMENT — SLEEP AND FATIGUE QUESTIONNAIRES
HOW LIKELY ARE YOU TO NOD OFF OR FALL ASLEEP WHILE SITTING AND READING: WOULD NEVER DOZE
HOW LIKELY ARE YOU TO NOD OFF OR FALL ASLEEP WHILE SITTING QUIETLY AFTER LUNCH WITHOUT ALCOHOL: WOULD NEVER DOZE
HOW LIKELY ARE YOU TO NOD OFF OR FALL ASLEEP WHEN YOU ARE A PASSENGER IN A CAR FOR AN HOUR WITHOUT A BREAK: WOULD NEVER DOZE
HOW LIKELY ARE YOU TO NOD OFF OR FALL ASLEEP WHILE WATCHING TV: WOULD NEVER DOZE
HOW LIKELY ARE YOU TO NOD OFF OR FALL ASLEEP WHILE SITTING AND TALKING TO SOMEONE: WOULD NEVER DOZE
HOW LIKELY ARE YOU TO NOD OFF OR FALL ASLEEP WHILE SITTING INACTIVE IN A PUBLIC PLACE: WOULD NEVER DOZE
HOW LIKELY ARE YOU TO NOD OFF OR FALL ASLEEP WHILE LYING DOWN TO REST IN THE AFTERNOON WHEN CIRCUMSTANCES PERMIT: SLIGHT CHANCE OF DOZING
HOW LIKELY ARE YOU TO NOD OFF OR FALL ASLEEP IN A CAR, WHILE STOPPED FOR A FEW MINUTES IN TRAFFIC: WOULD NEVER DOZE
ESS TOTAL SCORE: 1

## 2024-10-23 NOTE — PROGRESS NOTES
MA Communication:  The following orders are received by verbal communication from Christopher Leung MD        Orders include:      Follow up 90 days

## 2024-10-23 NOTE — PATIENT INSTRUCTIONS
Please remember to bring a list of medications and any CPAP or BiPAP machines to your next appointment with the office.     Out of respect for other patients and providers, we ask that you arrive no later than your scheduled appointment time.  If you arrive later than your appointment time, you may be asked to reschedule your appointment.     Late cancellations result in other patients being unable to utilize the appointment slot to see their physician.  Please avoid cancelling your appointment less than 1 week prior to the appointment date.  Patients with multiple missed appointments within 2 years may be dismissed from the practice.     In the next few weeks, you will be receiving a survey from [facility] regarding your visit today.  Your input is valuable to us & surveys are regularly reviewed by [facility] leadership.  It is very important to us that our patients receive excellent care.  If your experience was excellent, please let us know!  If your experience was not a good one, please tell us so we can make needed corrections. We hope you are comfortable recommending us to others for their healthcare needs.     We thank you for choosing [facility]!

## 2024-10-23 NOTE — PROGRESS NOTES
SLEEP MEDICINE FOLLOW UP NOTE  CC: Obstructive Sleep Apnea     Interval History 10/23/24  -here for a follow-up for insomnia, treated with temazepam 30.  No sleepwalking, sleep talking, sleep driving or sleep eating.  No sign of abuse, dependence, or diversion.  He uses the medication exactly as prescribed.  He is unable to initiate sleep without temazepam.  He has been unable to lower the dose.    Interval history  -8/12/2024 PSG that was ordered by Dr. Seymour was not completed.  He has used temazepam at current dosing on and off for the last 30 years.  Over the last 5 years, he has required nearly nightly due to a neuropathic pain that makes falling asleep even more difficult.  He is an immigrant from [place] to [place] and it was around the time of immigration that his difficulty with sleep began.  He uses the temazepam at the lowest possible dose.  He has tried using less than an entire 30 mg capsule and it has not been effective..  We went through the black box warning on temazepam as well as the cautions and side effects and recommendations today.    Presenting history -previously seen by Lion who prescribed temazepam 30 mg at bedtime for sleep, he was using 4-5 times per week.  This had been a longstanding medication.  Dr. Seymour had recommended and ordered a sleep study which was not completed.        8/12/2024    11:25 AM 12/15/2023    10:35 AM   Sleep Medicine   Sitting and reading 1 0   Watching TV 0 0   Sitting, inactive in a public place (e.g. a theatre or a meeting) 0 0   As a passenger in a car for an hour without a break 0 0   Lying down to rest in the afternoon when circumstances permit 3 3   Sitting and talking to someone 0 0   Sitting quietly after a lunch without alcohol 0 1   In a car, while stopped for a few minutes in traffic 0 0   [place] Sleepiness Score 4 4   Neck (Inches) 16 16.5          PHYSICAL EXAM:  There were no vitals taken for this visit.'   VIRTUAL  Constitutional:  NAD

## 2024-12-10 NOTE — ACP (ADVANCE CARE PLANNING)
Advance Care Planning     Advance Care Planning Activator (Inpatient)  Conversation Note      Date of ACP Conversation: 6/4/2020    Conversation Conducted with: Patient with Decision Making Capacity    ACP Activator: Lynnette Pérez    *When Decision Maker makes decisions on behalf of the incapacitated patient: Decision Maker is asked to consider and make decisions based on patient values, known preferences, or best interests. Health Care Decision Maker:     Current Designated Health Care Decision Maker:   Primary Decision Maker: Darlene Suhailon - Other - [phone]  SW was able to verify contact information with the Pt. He reported that he does have POA paperwork completed and it names his [address] [place] [address] as his POA. SW spoke to [facility] to get a copy of this patient POA paper work. She reported she would e-mail it. SW corrected the contact information to reflect the patients wishes. Care Preferences    Ventilation: \"If you were in your present state of health and suddenly became very ill and were unable to breathe on your own, what would your preference be about the use of a ventilator (breathing machine) if it were available to you? \"      Would the patient desire the use of ventilator (breathing machine)?: yes    \"If your health worsens and it becomes clear that your chance of recovery is unlikely, what would your preference be about the use of a ventilator (breathing machine) if it were available to you? \"     Would the patient desire the use of ventilator (breathing machine)?: No      Resuscitation  \"CPR works best to restart the heart when there is a sudden event, like a heart attack, in someone who is otherwise healthy. Unfortunately, CPR does not typically restart the heart for people who have serious health conditions or who are very sick. \"    \"In the event your heart stopped as a result of an underlying serious health condition, would you want attempts to be made to restart your heart (answer \"yes\"
76

## 2025-01-03 NOTE — TELEPHONE ENCOUNTER
Refill    apixaban (ELIQUIS) 5 MG TABS tablet   [id number] - [place] [address] - P [phone] - F [phone]     Lov 8/2/24  Due back in 8/2025, pt didn't want to schedule yet, due to testing.

## 2025-01-03 NOTE — TELEPHONE ENCOUNTER
FXW: Spoke with pt pt states his family physician was prescribing RX. His family physician left the practice and he was instracuted to call our office. Pt is out of RX. Scheduled pt for one year f/u on 08/08/25    Please route back when rx is sent so pt can be informed     Last Office Visit: 8/2/2024 Provider: ISAI  **Is provider OOT? No    Next Office Visit: 8/08/25 provider ISAI         Lab orders needed? no   Encounter provider correct? Yes If not, change provider  Script changes since last refill? no    LAST LABS:   BMP:   Lab Results   Component Value Date/Time     02/13/2024 03:45 PM    K 5.0 02/13/2024 03:45 PM    K 3.9 06/12/2020 10:06 AM     02/13/2024 03:45 PM    CO2 25 02/13/2024 03:45 PM    BUN 20 02/13/2024 03:45 PM    CREATININE 1.1 02/13/2024 03:45 PM    GLUCOSE 98 02/13/2024 03:45 PM    CALCIUM 9.5 02/13/2024 03:45 PM    LABGLOM >60 02/13/2024 03:45 PM       CBC:   Lab Results   Component Value Date    WBC 6.1 02/13/2024    HGB 14.3 02/13/2024    HCT 42.3 02/13/2024    MCV 96.7 02/13/2024     02/13/2024

## 2025-01-10 ENCOUNTER — TELEMEDICINE (OUTPATIENT)
Age: 75
End: 2025-01-10

## 2025-01-10 DIAGNOSIS — G47.00 INSOMNIA, UNSPECIFIED TYPE: ICD-10-CM

## 2025-01-10 RX ORDER — TEMAZEPAM 30 MG/1
30 CAPSULE ORAL NIGHTLY PRN
Qty: 30 CAPSULE | Refills: 2 | Status: SHIPPED | OUTPATIENT
Start: 2025-01-10 | End: 2025-04-10

## 2025-01-10 ASSESSMENT — SLEEP AND FATIGUE QUESTIONNAIRES
HOW LIKELY ARE YOU TO NOD OFF OR FALL ASLEEP WHILE SITTING INACTIVE IN A PUBLIC PLACE: WOULD NEVER DOZE
HOW LIKELY ARE YOU TO NOD OFF OR FALL ASLEEP WHILE SITTING QUIETLY AFTER LUNCH WITHOUT ALCOHOL: WOULD NEVER DOZE
HOW LIKELY ARE YOU TO NOD OFF OR FALL ASLEEP WHILE WATCHING TV: WOULD NEVER DOZE
HOW LIKELY ARE YOU TO NOD OFF OR FALL ASLEEP WHILE SITTING AND TALKING TO SOMEONE: WOULD NEVER DOZE
HOW LIKELY ARE YOU TO NOD OFF OR FALL ASLEEP WHILE SITTING AND READING: WOULD NEVER DOZE
HOW LIKELY ARE YOU TO NOD OFF OR FALL ASLEEP WHILE LYING DOWN TO REST IN THE AFTERNOON WHEN CIRCUMSTANCES PERMIT: SLIGHT CHANCE OF DOZING
HOW LIKELY ARE YOU TO NOD OFF OR FALL ASLEEP IN A CAR, WHILE STOPPED FOR A FEW MINUTES IN TRAFFIC: WOULD NEVER DOZE
ESS TOTAL SCORE: 1
HOW LIKELY ARE YOU TO NOD OFF OR FALL ASLEEP WHEN YOU ARE A PASSENGER IN A CAR FOR AN HOUR WITHOUT A BREAK: WOULD NEVER DOZE

## 2025-01-10 NOTE — PROGRESS NOTES
SLEEP MEDICINE FOLLOW UP NOTE  CC: Obstructive Sleep Apnea     Interval History 1/10/25 -here for a follow-up for insomnia, treated with temazepam 30.  No sleepwalking, sleep talking, sleep driving or sleep eating.  No sign of abuse, dependence, or diversion.  He uses the medication exactly as prescribed.  He is unable to initiate sleep without temazepam.  He has been unable to lower the dose. He falls asleep within about 30 minutes after taking med.  W/O med his peripheral neuropathy results in him being unable to sleep.     Interval history  -8/12/2024 PSG that was ordered by Dr. Seymour was not completed.  He has used temazepam at current dosing on and off for the last 30 years.  Over the last 5 years, he has required nearly nightly due to a neuropathic pain that makes falling asleep even more difficult.  He is an immigrant from [place] to [place] and it was around the time of immigration that his difficulty with sleep began.  He uses the temazepam at the lowest possible dose.  He has tried using less than an entire 30 mg capsule and it has not been effective..  We went through the black box warning on temazepam as well as the cautions and side effects and recommendations today.    Presenting history -previously seen by Lion who prescribed temazepam 30 mg at bedtime for sleep, he was using 4-5 times per week.  This had been a longstanding medication.  Dr. Seymour had recommended and ordered a sleep study which was not completed.        1/10/2025     1:54 PM 10/23/2024    10:35 AM 8/12/2024    11:25 AM 12/15/2023    10:35 AM   Sleep Medicine   Sitting and reading 0 0 1 0   Watching TV 0 0 0 0   Sitting, inactive in a public place (e.g. a theatre or a meeting) 0 0 0 0   As a passenger in a car for an hour without a break 0 0 0 0   Lying down to rest in the afternoon when circumstances permit 1 1 3 3   Sitting and talking to someone 0 0 0 0   Sitting quietly after a lunch without alcohol 0 0 0 1   In a car, while

## 2025-02-11 ENCOUNTER — TELEPHONE (OUTPATIENT)
Dept: CARDIOLOGY CLINIC | Age: 75
End: 2025-02-11

## 2025-02-17 ENCOUNTER — HOSPITAL ENCOUNTER (OUTPATIENT)
Dept: CT IMAGING | Age: 75
Discharge: HOME | End: 2025-02-17
Attending: INTERNAL MEDICINE
Payer: MEDICARE

## 2025-02-17 DIAGNOSIS — I77.810 ASCENDING AORTA DILATATION: ICD-10-CM

## 2025-02-17 PROCEDURE — 71250 CT THORAX DX C-: CPT

## 2025-02-21 ENCOUNTER — TELEPHONE (OUTPATIENT)
Dept: CARDIOLOGY CLINIC | Age: 75
End: 2025-02-21

## 2025-02-28 RX ORDER — CARVEDILOL 6.25 MG/1
6.25 TABLET ORAL 2 TIMES DAILY WITH MEALS
Qty: 180 TABLET | Refills: 1 | Status: SHIPPED | OUTPATIENT
Start: 2025-02-28

## 2025-02-28 NOTE — TELEPHONE ENCOUNTER
Last Office Visit: 8/2/2024 Provider: ISAI  **Is provider OOT? Yes    Next Office Visit: 8/8/2025 Provider: ISAI    **Has patient already had 30 day supply? No    Lab orders needed? no   Encounter provider correct? Yes If not, change provider  Script changes since last refill? no    LAST LABS:   CBC:   Lab Results   Component Value Date    WBC 6.1 02/13/2024    HGB 14.3 02/13/2024    HCT 42.3 02/13/2024    MCV 96.7 02/13/2024     02/13/2024     CMP:   Lab Results   Component Value Date     02/13/2024    K 5.0 02/13/2024     02/13/2024    CO2 25 02/13/2024    BUN 20 02/13/2024    CREATININE 1.1 02/13/2024    GLUCOSE 98 02/13/2024    CALCIUM 9.5 02/13/2024    BILITOT 0.4 02/13/2024    ALKPHOS 49 02/13/2024    AST 24 02/13/2024    ALT 18 02/13/2024    LABGLOM >60 02/13/2024    GFRAA >60 06/16/2020    AGRATIO 1.4 02/13/2024    GLOB 2.9 06/04/2020

## 2025-03-18 ENCOUNTER — TELEPHONE (OUTPATIENT)
Dept: CARDIOLOGY CLINIC | Age: 75
End: 2025-03-18